# Patient Record
Sex: FEMALE | Race: BLACK OR AFRICAN AMERICAN | Employment: OTHER | URBAN - METROPOLITAN AREA
[De-identification: names, ages, dates, MRNs, and addresses within clinical notes are randomized per-mention and may not be internally consistent; named-entity substitution may affect disease eponyms.]

---

## 2017-04-30 PROBLEM — K80.20 CALCULUS OF GALLBLADDER WITHOUT CHOLECYSTITIS WITHOUT OBSTRUCTION: Status: ACTIVE | Noted: 2017-04-30

## 2017-04-30 PROBLEM — M23.50 CHRONIC KNEE INSTABILITY: Status: ACTIVE | Noted: 2017-04-30

## 2017-10-08 ENCOUNTER — HOSPITAL ENCOUNTER (EMERGENCY)
Age: 82
Discharge: HOME | End: 2017-10-08
Attending: EMERGENCY MEDICINE
Payer: MEDICARE

## 2017-10-08 ENCOUNTER — APPOINTMENT (OUTPATIENT)
Dept: CT IMAGING | Age: 82
End: 2017-10-08
Attending: EMERGENCY MEDICINE
Payer: MEDICARE

## 2017-10-08 VITALS
TEMPERATURE: 98.8 F | HEART RATE: 80 BPM | SYSTOLIC BLOOD PRESSURE: 134 MMHG | OXYGEN SATURATION: 94 % | RESPIRATION RATE: 18 BRPM | DIASTOLIC BLOOD PRESSURE: 73 MMHG

## 2017-10-08 DIAGNOSIS — R14.0 ABDOMINAL BLOATING: Primary | ICD-10-CM

## 2017-10-08 LAB
ALBUMIN SERPL-MCNC: 4.1 G/DL (ref 3.2–4.6)
ALBUMIN/GLOB SERPL: 1 {RATIO} (ref 1.2–3.5)
ALP SERPL-CCNC: 63 U/L (ref 50–136)
ALT SERPL-CCNC: 22 U/L (ref 12–65)
ANION GAP SERPL CALC-SCNC: 10 MMOL/L (ref 7–16)
AST SERPL-CCNC: 19 U/L (ref 15–37)
BACTERIA URNS QL MICRO: 0 /HPF
BASOPHILS # BLD: 0 K/UL (ref 0–0.2)
BASOPHILS NFR BLD: 0 % (ref 0–2)
BILIRUB SERPL-MCNC: 0.9 MG/DL (ref 0.2–1.1)
BUN SERPL-MCNC: 13 MG/DL (ref 8–23)
CALCIUM SERPL-MCNC: 11 MG/DL (ref 8.3–10.4)
CASTS URNS QL MICRO: NORMAL /LPF
CHLORIDE SERPL-SCNC: 103 MMOL/L (ref 98–107)
CO2 SERPL-SCNC: 27 MMOL/L (ref 21–32)
CREAT SERPL-MCNC: 0.98 MG/DL (ref 0.6–1)
DIFFERENTIAL METHOD BLD: ABNORMAL
EOSINOPHIL # BLD: 0 K/UL (ref 0–0.8)
EOSINOPHIL NFR BLD: 0 % (ref 0.5–7.8)
EPI CELLS #/AREA URNS HPF: NORMAL /HPF
ERYTHROCYTE [DISTWIDTH] IN BLOOD BY AUTOMATED COUNT: 13 % (ref 11.9–14.6)
GLOBULIN SER CALC-MCNC: 4.2 G/DL (ref 2.3–3.5)
GLUCOSE SERPL-MCNC: 121 MG/DL (ref 65–100)
HCT VFR BLD AUTO: 37.9 % (ref 35.8–46.3)
HGB BLD-MCNC: 12.6 G/DL (ref 11.7–15.4)
IMM GRANULOCYTES # BLD: 0 K/UL (ref 0–0.5)
IMM GRANULOCYTES NFR BLD: 0.4 % (ref 0–5)
LIPASE SERPL-CCNC: 218 U/L (ref 73–393)
LYMPHOCYTES # BLD: 1.2 K/UL (ref 0.5–4.6)
LYMPHOCYTES NFR BLD: 21 % (ref 13–44)
MCH RBC QN AUTO: 31.7 PG (ref 26.1–32.9)
MCHC RBC AUTO-ENTMCNC: 33.2 G/DL (ref 31.4–35)
MCV RBC AUTO: 95.2 FL (ref 79.6–97.8)
MONOCYTES # BLD: 0.2 K/UL (ref 0.1–1.3)
MONOCYTES NFR BLD: 4 % (ref 4–12)
NEUTS SEG # BLD: 4.2 K/UL (ref 1.7–8.2)
NEUTS SEG NFR BLD: 75 % (ref 43–78)
PLATELET # BLD AUTO: 226 K/UL (ref 150–450)
PMV BLD AUTO: 11 FL (ref 10.8–14.1)
POTASSIUM SERPL-SCNC: 3.4 MMOL/L (ref 3.5–5.1)
PROT SERPL-MCNC: 8.3 G/DL (ref 6.3–8.2)
RBC # BLD AUTO: 3.98 M/UL (ref 4.05–5.25)
RBC #/AREA URNS HPF: NORMAL /HPF
SODIUM SERPL-SCNC: 140 MMOL/L (ref 136–145)
TROPONIN I BLD-MCNC: 0.01 NG/ML (ref 0.02–0.05)
TROPONIN I BLD-MCNC: 0.01 NG/ML (ref 0.02–0.05)
WBC # BLD AUTO: 5.7 K/UL (ref 4.3–11.1)
WBC URNS QL MICRO: NORMAL /HPF

## 2017-10-08 PROCEDURE — 99285 EMERGENCY DEPT VISIT HI MDM: CPT | Performed by: EMERGENCY MEDICINE

## 2017-10-08 PROCEDURE — 74011000258 HC RX REV CODE- 258: Performed by: EMERGENCY MEDICINE

## 2017-10-08 PROCEDURE — 96375 TX/PRO/DX INJ NEW DRUG ADDON: CPT | Performed by: EMERGENCY MEDICINE

## 2017-10-08 PROCEDURE — 93005 ELECTROCARDIOGRAM TRACING: CPT | Performed by: EMERGENCY MEDICINE

## 2017-10-08 PROCEDURE — 96374 THER/PROPH/DIAG INJ IV PUSH: CPT | Performed by: EMERGENCY MEDICINE

## 2017-10-08 PROCEDURE — 80053 COMPREHEN METABOLIC PANEL: CPT | Performed by: EMERGENCY MEDICINE

## 2017-10-08 PROCEDURE — 81015 MICROSCOPIC EXAM OF URINE: CPT | Performed by: EMERGENCY MEDICINE

## 2017-10-08 PROCEDURE — 85025 COMPLETE CBC W/AUTO DIFF WBC: CPT | Performed by: EMERGENCY MEDICINE

## 2017-10-08 PROCEDURE — 83690 ASSAY OF LIPASE: CPT | Performed by: EMERGENCY MEDICINE

## 2017-10-08 PROCEDURE — 74011636320 HC RX REV CODE- 636/320: Performed by: EMERGENCY MEDICINE

## 2017-10-08 PROCEDURE — 74011250636 HC RX REV CODE- 250/636: Performed by: EMERGENCY MEDICINE

## 2017-10-08 PROCEDURE — 74177 CT ABD & PELVIS W/CONTRAST: CPT

## 2017-10-08 PROCEDURE — 84484 ASSAY OF TROPONIN QUANT: CPT

## 2017-10-08 RX ORDER — DICYCLOMINE HYDROCHLORIDE 20 MG/1
20 TABLET ORAL EVERY 6 HOURS
Qty: 10 TAB | Refills: 0 | Status: SHIPPED | OUTPATIENT
Start: 2017-10-08 | End: 2017-10-08

## 2017-10-08 RX ORDER — ONDANSETRON 2 MG/ML
4 INJECTION INTRAMUSCULAR; INTRAVENOUS
Status: COMPLETED | OUTPATIENT
Start: 2017-10-08 | End: 2017-10-08

## 2017-10-08 RX ORDER — DICYCLOMINE HYDROCHLORIDE 20 MG/1
20 TABLET ORAL EVERY 6 HOURS
Qty: 10 TAB | Refills: 0 | Status: SHIPPED | OUTPATIENT
Start: 2017-10-08 | End: 2017-10-13

## 2017-10-08 RX ORDER — HYDROMORPHONE HYDROCHLORIDE 1 MG/ML
0.5 INJECTION, SOLUTION INTRAMUSCULAR; INTRAVENOUS; SUBCUTANEOUS
Status: COMPLETED | OUTPATIENT
Start: 2017-10-08 | End: 2017-10-08

## 2017-10-08 RX ORDER — SODIUM CHLORIDE 0.9 % (FLUSH) 0.9 %
10 SYRINGE (ML) INJECTION
Status: COMPLETED | OUTPATIENT
Start: 2017-10-08 | End: 2017-10-08

## 2017-10-08 RX ADMIN — IOPAMIDOL 100 ML: 755 INJECTION, SOLUTION INTRAVENOUS at 20:00

## 2017-10-08 RX ADMIN — ONDANSETRON 4 MG: 2 INJECTION INTRAMUSCULAR; INTRAVENOUS at 18:48

## 2017-10-08 RX ADMIN — SODIUM CHLORIDE 100 ML: 900 INJECTION, SOLUTION INTRAVENOUS at 20:00

## 2017-10-08 RX ADMIN — HYDROMORPHONE HYDROCHLORIDE 0.5 MG: 1 INJECTION, SOLUTION INTRAMUSCULAR; INTRAVENOUS; SUBCUTANEOUS at 18:48

## 2017-10-08 RX ADMIN — Medication 10 ML: at 20:00

## 2017-10-08 NOTE — ED PROVIDER NOTES
HPI Comments: 80-year-old female presents with left lower quadrant pain and abdominal bloating which she states started this morning. She reports she vomited twice earlier today. She mentions feels somewhat constipated. She denies any significant fevers, shortness of breath, dysuria or chest pain. She mentioned she had a bowel movement this morning. She has a history of previous stroke with right-sided weakness and is currently taking Plavix. Patient denies any significant previous abdominal surgeries or history of diverticulitis. Patient is a 80 y.o. female presenting with abdominal pain. The history is provided by the patient. No  was used. Abdominal Pain    Associated symptoms include constipation. Pertinent negatives include no fever, no diarrhea, no nausea, no vomiting and no back pain.         Past Medical History:   Diagnosis Date    Abnormality of gait 5/20/2015    Allergic rhinitis 6/4/2013    Asymptomatic bilateral carotid artery stenosis 10/14/2014    Back problem 10/27/2015    Cerebrovascular disease 5/20/2015    Cerumen impaction     Essential hypertension, benign 6/4/2013    GI bleed     due to ulcer    Glaucoma 5/20/2015    Hx of seasonal allergies     Hyperparathyroidism ([address] 6/4/2013    Impaired fasting glucose 5/20/2015    Occlusion and stenosis of carotid artery without mention of cerebral infarction 5/20/2015    Osteoporosis, unspecified 6/4/2013    Other and unspecified hyperlipidemia 6/4/2013    Other decreased white blood cell count 6/4/2013    SNHL (sensorineural hearing loss) 6/4/2013    Stroke ([address]     Toxic multinodular goiter 6/4/2013    Toxic multinodular goiter without mention of thyrotoxic crisis or storm 6/4/2013    Transient ischemic attack (TIA), and cerebral infarction without residual deficits(V12.54) 6/4/2013    Unspecified hearing loss 6/4/2013    Vitamin D deficiency        Past Surgical History:   Procedure Laterality Date    HX CATARACT REMOVAL Bilateral     HX COLONOSCOPY      HX HEENT      Sinus surgery         Family History:   Problem Relation Age of Onset    Diabetes Mother     Kidney Disease Maternal Grandfather        Social History     Social History    Marital status:      Spouse name: N/A    Number of children: N/A    Years of education: N/A     Occupational History    Not on file. Social History Main Topics    Smoking status: Never Smoker    Smokeless tobacco: Never Used    Alcohol use No    Drug use: No    Sexual activity: No     Other Topics Concern    Not on file     Social History Narrative         ALLERGIES: Seafood and Luck    Review of Systems   Constitutional: Negative for fatigue and fever. Eyes: Negative for visual disturbance. Respiratory: Negative for cough and shortness of breath. Gastrointestinal: Positive for abdominal distention, abdominal pain and constipation. Negative for diarrhea, nausea and vomiting. Genitourinary: Negative for flank pain. Musculoskeletal: Negative for back pain. Skin: Negative for rash. Neurological: Negative for weakness. Psychiatric/Behavioral: Negative for confusion. Vitals:    10/08/17 1717   BP: 196/82   Pulse: 68   Resp: 18   Temp: 98.1 °F (36.7 °C)   SpO2: 100%            Physical Exam   Constitutional: She is oriented to person, place, and time. She appears well-developed and well-nourished. No distress. HENT:   Head: Normocephalic and atraumatic. Eyes: Conjunctivae and EOM are normal. Pupils are equal, round, and reactive to light. Neck: Normal range of motion. Neck supple. Cardiovascular: Normal rate, regular rhythm and normal heart sounds. Pulmonary/Chest: Effort normal and breath sounds normal. No respiratory distress. She has no wheezes. She has no rales. Abdominal: Soft. She exhibits no distension. There is tenderness. There is no rebound. Tenderness to palpation in the left lower quadrant.   No significant guarding or rebound. Overall nonsurgical abdomen. Musculoskeletal: Normal range of motion. She exhibits no edema or tenderness. Neurological: She is alert and oriented to person, place, and time. Skin: Skin is warm and dry. No rash noted. She is not diaphoretic. Psychiatric: She has a normal mood and affect. Her behavior is normal.   Vitals reviewed. MDM  Number of Diagnoses or Management Options  Abdominal bloating: new and does not require workup  Diagnosis management comments: Patient's lab work is quite reassuring. Patient's CT shows no acute findings. Her gallbladder does have stones but she is not tender in her right upper quadrant and her LFTs are normal. I discussed this timing with the patient and she agrees to bring up with her PCP. We will have the patient follow up with primary care provider. Will discharge with Bentyl. I advised she drinks prune juice for her constipation. Return precautions discussed.        Amount and/or Complexity of Data Reviewed  Clinical lab tests: ordered and reviewed (Results for orders placed or performed during the hospital encounter of 10/08/17  -CBC WITH AUTOMATED DIFF       Result                                            Value                         Ref Range                       WBC                                               5.7                           4.3 - 11.1 K/uL                 RBC                                               3.98 (L)                      4.05 - 5.25 M/uL                HGB                                               12.6                          11.7 - 15.4 g/dL                HCT                                               37.9                          35.8 - 46.3 %                   MCV                                               95.2                          79.6 - 97.8 FL                  MCH                                               31.7                          26.1 - 32.9 PG MCHC                                              33.2                          31.4 - 35.0 g/dL                RDW                                               13.0                          11.9 - 14.6 %                   PLATELET                                          226                           150 - 450 K/uL                  MPV                                               11.0                          10.8 - 14.1 FL                  DF                                                AUTOMATED                                                     NEUTROPHILS                                       75                            43 - 78 %                       LYMPHOCYTES                                       21                            13 - 44 %                       MONOCYTES                                         4                             4.0 - 12.0 %                    EOSINOPHILS                                       0 (L)                         0.5 - 7.8 %                     BASOPHILS                                         0                             0.0 - 2.0 %                     IMMATURE GRANULOCYTES                             0.4                           0.0 - 5.0 %                     ABS. NEUTROPHILS                                  4.2                           1.7 - 8.2 K/UL                  ABS. LYMPHOCYTES                                  1.2                           0.5 - 4.6 K/UL                  ABS. MONOCYTES                                    0.2                           0.1 - 1.3 K/UL                  ABS. EOSINOPHILS                                  0.0                           0.0 - 0.8 K/UL                  ABS. BASOPHILS                                    0.0                           0.0 - 0.2 K/UL                  ABS. IMM.  GRANS.                                  0.0                           0.0 - 0.5 K/UL             -METABOLIC PANEL, COMPREHENSIVE       Result Value                         Ref Range                       Sodium                                            140                           136 - 145 mmol/L                Potassium                                         3.4 (L)                       3.5 - 5.1 mmol/L                Chloride                                          103                           98 - 107 mmol/L                 CO2                                               27                            21 - 32 mmol/L                  Anion gap                                         10                            7 - 16 mmol/L                   Glucose                                           121 (H)                       65 - 100 mg/dL                  BUN                                               13                            8 - 23 MG/DL                    Creatinine                                        0.98                          0.6 - 1.0 MG/DL                 GFR est AA                                        >60                           >60 ml/min/1.73m2               GFR est non-AA                                    58 (L)                        >60 ml/min/1.73m2               Calcium                                           11.0 (H)                      8.3 - 10.4 MG/DL                Bilirubin, total                                  0.9                           0.2 - 1.1 MG/DL                 ALT (SGPT)                                        22                            12 - 65 U/L                     AST (SGOT)                                        19                            15 - 37 U/L                     Alk. phosphatase                                  63                            50 - 136 U/L                    Protein, total                                    8.3 (H)                       6.3 - 8.2 g/dL                  Albumin                                           4.1 3.2 - 4.6 g/dL                  Globulin                                          4.2 (H)                       2.3 - 3.5 g/dL                  A-G Ratio                                         1.0 (L)                       1.2 - 3.5                  -LIPASE       Result                                            Value                         Ref Range                       Lipase                                            218                           73 - 393 U/L               -URINE MICROSCOPIC       Result                                            Value                         Ref Range                       WBC                                               10-20                         0 /hpf                          RBC                                               0-3                           0 /hpf                          Epithelial cells                                  0-3                           0 /hpf                          Bacteria                                          0                             0 /hpf                          Casts                                             0-3                           0 /lpf                     -POC TROPONIN-I       Result                                            Value                         Ref Range                       Troponin-I (POC)                                  0.01 (L)                      0.02 - 0.05 ng/ml          -POC TROPONIN-I       Result                                            Value                         Ref Range                       Troponin-I (POC)                                  0.01 (L)                      0.02 - 0.05 ng/ml          )  Tests in the radiology section of CPT®: ordered and reviewed (Ct Abd Pelv W Cont    Result Date: 10/8/2017  CT ABDOMEN AND PELVIS WITH INTRAVENOUS CONTRAST DATED 10/8/2017. History: Left lower quadrant pain this morning. Comparison: None.  Technique:   Multiple contiguous helical CT images reconstructed at 5 mm intervals were obtained from above the diaphragms through the ischial tuberosities following 100 cc Isovue-370 without acute complication. All CT scans performed at this facility use one or all of the following: Automated exposure control, adjustment of the mA and/or kVp according to patient's size, iterative reconstruction. Findings: CT ABDOMEN:  Limited evaluation of the lung bases and base of the mediastinum demonstrates no significant abnormalities. The Liver demonstrates multiple cystic-appearing lesions. Mild to moderate intrahepatic biliary ductal dilation is seen. .  The spleen is homogeneous in attenuation. No contour deforming or enhancing mass lesions are seen of the pancreas or adrenal glands. The gallbladder contains numerous gallstones. The common bile duct appears dilated measuring up to 14 mm in diameter. No definite distal obstructing calcified stone or lesion is definitely appreciated. The kidneys enhance symmetrically and no evidence of hydronephrosis is seen. Multiple cystic appearing cortical lesions are seen of the kidneys. Definitive assessment of multiple of these is limited by their small size. The visualized loops of small bowel and colon are normal in caliber. The appendix is best seen on image 50 and is unremarkable. No free fluid, free air, or focal inflammatory changes are seen in the abdomen. No adenopathy is seen. The abdominal aorta demonstrates moderate atherosclerotic calcification. CT PELVIS: No abnormal pelvic fluid collections or inflammatory changes are present. No pelvic adenopathy is seen. The urinary bladder is unremarkable. IMPRESSION:  1. No acute findings are seen to suggest an etiology for the patient's abdominal pain. Specifically, no bowel changes, free air, abnormal fluid collections or focal inflammatory changes are seen.  2.  Gallbladder appearing filled with gallstones and prominence of the intrahepatic and extrahepatic bile ducts. This is unlikely to be associated with the patient's left lower quadrant symptoms. Recommend correlation with liver function tests.  If indicated, this would be further assessed with an outpatient Limited abdominal ultrasound.     )  Review and summarize past medical records: yes  Independent visualization of images, tracings, or specimens: yes    Risk of Complications, Morbidity, and/or Mortality  Presenting problems: moderate  Diagnostic procedures: moderate  Management options: moderate    Patient Progress  Patient progress: improved    ED Course       Procedures

## 2017-10-08 NOTE — ED TRIAGE NOTES
During assessment, it has been found that patient does not actually have back pain but rather general abdominal pain that worse in the LLQ.

## 2017-10-08 NOTE — ED NOTES
Pt resting in chair, resp easy , VSS, family at bedside, belongs in reach. Offered restroom and change in position.

## 2017-10-08 NOTE — ED TRIAGE NOTES
Pt arrives with EMS [address], from home, with complaint of left lower back pain that started this morning. No recent falls or trauma. History of stroke with R sided weakness. Patient alert and oriented. Patient states that she gets sore in the same area sometimes, but not this bad.

## 2017-10-09 LAB
ATRIAL RATE: 73 BPM
CALCULATED P AXIS, ECG09: 51 DEGREES
CALCULATED R AXIS, ECG10: 22 DEGREES
CALCULATED T AXIS, ECG11: 21 DEGREES
DIAGNOSIS, 93000: NORMAL
P-R INTERVAL, ECG05: 210 MS
Q-T INTERVAL, ECG07: 388 MS
QRS DURATION, ECG06: 120 MS

## 2017-10-09 NOTE — DISCHARGE INSTRUCTIONS
Gas and Bloating: Care Instructions  Your Care Instructions  Gas and bloating can be uncomfortable and embarrassing problems. All people pass gas, but some people produce more gas than others, sometimes enough to cause distress. It is normal to pass gas from 6 to 20 times per day. Excess gas usually is not caused by a serious health problem. Gas and bloating usually are caused by something you eat or drink, including some food supplements and medicines. Gas and bloating are usually harmless and go away without treatment. However, changing your diet can help end the problem. Some over-the-counter medicines can help prevent gas and relieve bloating. Follow-up care is a key part of your treatment and safety. Be sure to make and go to all appointments, and call your doctor if you are having problems. It's also a good idea to know your test results and keep a list of the medicines you take. How can you care for yourself at home? · Keep a food diary if you think a food gives you gas. Write down what you eat or drink. Also record when you get gas. If you notice that a food seems to cause your gas each time, avoid it and see if the gas goes away. Examples of foods that cause gas include:  ¨ Fried and fatty foods. ¨ Beans. ¨ Vegetables such as artichokes, asparagus, broccoli, brussels sprouts, cabbage, cauliflower, cucumbers, green peppers, onions, peas, radishes, and raw potatoes. ¨ Fruits such as apricots, bananas, melons, peaches, pears, prunes, and raw apples. ¨ Wheat and wheat bran. · Soak dry beans in water overnight, then dump the water and cook the soaked beans in new water. This can help prevent gas and bloating. · If you have problems with lactose, avoid dairy products such as milk and cheese. · Try not to swallow air. Do not drink through a straw, gulp your food, or chew gum. · Take an over-the-counter medicine. Read and follow all instructions on the label.   ¨ Food enzymes, such as Beano, can be added to gas-producing foods to prevent gas. ¨ Antacids, such as Maalox Anti-Gas and Mylanta Gas, can relieve bloating by making you burp. Be careful when you take over-the-counter antacid medicines. Many of these medicines have aspirin in them. Read the label to make sure that you are not taking more than the recommended dose. Too much aspirin can be harmful. ¨ Activated charcoal tablets, such as CharcoCaps, may decrease odor from gas you pass. ¨ If you have problems with lactose, you can take medicines such as Dairy Ease and Lactaid with dairy products to prevent gas and bloating. · Get some exercise regularly. When should you call for help? Call 911 anytime you think you may need emergency care. For example, call if:  · You have gas and signs of a heart attack, such as:  ¨ Chest pain or pressure. ¨ Sweating. ¨ Shortness of breath. ¨ Nausea or vomiting. ¨ Pain that spreads from the chest to the neck, jaw, or one or both shoulders or arms. ¨ Dizziness or lightheadedness. ¨ A fast or uneven pulse. After calling 911, chew 1 adult-strength aspirin. Wait for an ambulance. Do not try to drive yourself. Call your doctor now or seek immediate medical care if:  · You have severe belly pain. · You have blood in your stool. Watch closely for changes in your health, and be sure to contact your doctor if:  · You have blood or pus in your urine. · Your urine is cloudy or smells bad. · You are burping and have trouble swallowing. · You feel bloated and have swelling in your belly. · You do not get better as expected. Where can you learn more? Go to http://martin-anton.info/. Enter C384 in the search box to learn more about \"Gas and Bloating: Care Instructions. \"  Current as of: March 20, 2017  Content Version: 11.3  © 4505-1799 [employer]. Care instructions adapted under license by [employer] (which disclaims liability or warranty for this information).  If you have questions about a medical condition or this instruction, always ask your healthcare professional. [place] [address] any warranty or liability for your use of this information.

## 2017-10-09 NOTE — ED NOTES
I have reviewed discharge instructions with the patient and her daughter. The patient and daughter verbalized understanding. Patient left ED via Discharge Method: wheelchair to Home with Daughter    Opportunity for questions and clarification provided. Patient given 1 scripts.

## 2017-10-12 ENCOUNTER — PATIENT OUTREACH (OUTPATIENT)
Dept: CASE MANAGEMENT | Age: 82
End: 2017-10-12

## 2017-10-13 NOTE — PROGRESS NOTES
Date/Time of Call:       10/09/17 1:00 PM   What was the patient seen in the ED for? Patient was seen in ED for diagnosis of Abdominal Bloating   Does the patient understand his/her diagnosis and/or treatment and what happened during the ED visit? Spoke with Daughter who stated understanding of treatment and diagnosis. Did the patient receive discharge instructions from the ED? Daughter stated receiving d/c instructions from the ED. Review any discharge instructions (see notes in [facility]). Ask patient if they understand these. Do they have any questions? Daughter and Care Coordinator reviewed DC instructions. Daughter stated understanding and no questions asked. Were home services ordered (nursing, PT, OT, ST, etc.)? No HH ordered at d/c   If so, has the first visit occurred? If not, why? (Assist with coordination of services if necessary.) N/A   DME ordered at d/c? No DME ordered at d/c. If so, has it been received? If not, why?  (Assist with coordination of arranging DME orders if necessary.) N/A   Complete a review of all medications (new, continued and discontinued meds per the D/C instructions and medication tab in [facility]). Review of medications has been completed by Daughter and Care Coordinator. Bentyl prescribed at d/c. Were all new prescriptions filled? If not, why?  (Assist with obtainment of medications if necessary.) Yes. Does the patient understand the purpose and dosing instructions for all medications? (If patient has questions, provide explanation and education.) Daughter stated understanding of purpose, and dosing instructions for all medications. Does the patient have any problems in performing ADLs? (If patient is unable to perform ADLs  what is the limiting factor(s)?   Do they have a support system that can assist? If no support system is present, discuss possible assistance that they may be able to obtain.) Daughter states patient is independent with all ADLs. Does the patient have all follow-up appointments scheduled? Has transportation been arranged? 7 day f/up with PCP?    7-14 day f/up with specialist?    If f/up has not been made  what actions has the care coordinator made to accomplish this? Has transportation been arranged? [facility] follow-up should be within 7 days of discharge; all others should have PCP follow-up within 7 days of discharge; follow-ups with other specialists as appropriate or ordered.) PCP f/u 10/11. No need for transportation assistance. Any other questions or concerns expressed by the patient? No other questions asked. No further needs identified. Gratitude expressed for care and call. HERMINIA Call Completed By: Gely Betancourt LPN   Care Coordinator  [facility]          This note will not be viewable in [address]

## 2018-06-12 PROBLEM — I35.1 AORTIC VALVE REGURGITATION: Status: ACTIVE | Noted: 2018-06-12

## 2019-03-29 ENCOUNTER — HOSPITAL ENCOUNTER (OUTPATIENT)
Dept: PHYSICAL THERAPY | Age: 84
Discharge: HOME | End: 2019-03-29
Attending: INTERNAL MEDICINE
Payer: MEDICARE

## 2019-03-29 DIAGNOSIS — Z91.81 AT RISK FOR FALLS: ICD-10-CM

## 2019-03-29 DIAGNOSIS — R26.89 POOR BALANCE: ICD-10-CM

## 2019-03-29 DIAGNOSIS — R26.9 GAIT ABNORMALITY: ICD-10-CM

## 2019-03-29 PROCEDURE — 97161 PT EVAL LOW COMPLEX 20 MIN: CPT

## 2019-03-29 PROCEDURE — 97110 THERAPEUTIC EXERCISES: CPT

## 2019-03-29 NOTE — THERAPY EVALUATION
Michael Racer  : 1934  Primary: [address] Medic*  Secondary:  [address] at [facility]  2700 [facility], [address] [place], [place] [address]way [address], [address]  [phone]   [phone]        OUTPATIENT PHYSICAL THERAPY:Initial Assessment 3/29/2019   ICD-10: Treatment Diagnosis: Difficulty in walking, not elsewhere classified (R26.2)/Muscle weakness (generalized) (M62.81)  Precautions/Allergies:   Seafood and [place]   MD Orders: Evaluate and treat  MEDICAL/REFERRING DIAGNOSIS:  Gait abnormality [R26.9]  At risk for falls [Z91.81]  Poor balance [R26.89]   DATE OF ONSET: Chronic   REFERRING PHYSICIAN: Veronika Colon*  RETURN PHYSICIAN APPOINTMENT: unknown      INITIAL ASSESSMENT:  Ms. Kim Sorensen presents with decreased lower extremity strength/core strength, imbalance, and difficulty walking. Patient is at higher fall risk based on score received on Guerra Balance Scale. Patient would benefit from skilled physical therapy to address problems and goals. Thank you for this referral.      PROBLEM LIST (Impacting functional limitations):  1. Decreased Strength  2. Decreased Transfer Abilities  3. Decreased Ambulation Ability/Technique  4. Decreased Balance  5. Decreased Activity Tolerance  6. Decreased [place] with Home Exercise Program INTERVENTIONS PLANNED: (Treatment may consist of any combination of the following)  1. Balance Exercise  2. Gait Training  3. Home Exercise Program (HEP)  4. Neuromuscular Re-education/Strengthening  5. Range of Motion (ROM)  6. Therapeutic Exercise/Strengthening   TREATMENT PLAN:  Effective Dates: 3/29/2019 TO 2019 (90 days). Frequency/Duration: 1 time a week for 90 Day(s)  GOALS: (Goals have been discussed and agreed upon with patient.)  Short-Term Functional Goals: Time Frame: 30 days   1. Patient will be independent with home exercise program to improve strength and balance.    2. Patient will increase score on Guerra Balance Scale to greater than or equal to 43/56 demonstrating improved balance and decreased fall risk with daily activities. Discharge Goals: Time Frame: 90 days   1. Patient will increase score on Guerra Balance Scale to greater than or equal to 46/56 demonstrating improved balance and decreased fall risk with daily activities. 2. Patient will report improved ease with mobility. 3. Patient will ambulate with rolling walker demonstrating erect posture. OUTCOME MEASURE:   Tool Used: Guerra Balance Scale  Score:  Initial: 38/56 Most Recent: X/56 (Date: -- )   Interpretation of Score: Each section is scored on a 0-4 scale, 0 representing the patients inability to perform the task and 4 representing independence. The scores of each section are added together for a total score of 56. The higher the patients score, the more independent the patient is. Any score below 45 indicates increased risk for falls. MEDICAL NECESSITY:   · Patient is expected to demonstrate progress in strength and balance to improve safety during activities of daily living. REASON FOR SERVICES/OTHER COMMENTS:  · Patient continues to require skilled intervention due to higher fall risk with daily activities. Total Duration:  PT Patient Time In/Time Out  Time In: 1345  Time Out: 1430    Rehabilitation Potential For Stated Goals: Excellent  Regarding Breana Justin's therapy, I certify that the treatment plan above will be carried out by a therapist or under their direction. Thank you for this referral,  Yovanny Lynn, PT     Referring Physician Signature: Edilson Sherman* _______________________________ Date _____________     PAIN/SUBJECTIVE:   Initial: Pain Intensity 1: 0  Post Session:  0/10   HISTORY:   History of Injury/Illness (Reason for Referral):  Patient complains of weakness, imbalance, and difficulty walking. Patient has had no falls over the past several months. Patient rates dizziness as 0/10 and pain level as 0/10. Patient has been using a rollator walker for ambulation over the past six months. Patient would like to get her legs and back stronger. Past Medical History/Comorbidities:   Ms. Elaina Thomas  has a past medical history of Abnormality of gait (5/20/2015), Allergic rhinitis (6/4/2013), Asymptomatic bilateral carotid artery stenosis (10/14/2014), Back problem (10/27/2015), Cerebrovascular disease (5/20/2015), Cerumen impaction, Essential hypertension, benign (6/4/2013), GI bleed, Glaucoma (5/20/2015), seasonal allergies, Hyperparathyroidism ([facility] [address] (6/4/2013), Impaired fasting glucose (5/20/2015), Occlusion and stenosis of carotid artery without mention of cerebral infarction (5/20/2015), Osteoporosis, unspecified (6/4/2013), Other and unspecified hyperlipidemia (6/4/2013), Other decreased white blood cell count (6/4/2013), SNHL (sensorineural hearing loss) (6/4/2013), Stroke ([facility] [address] Toxic multinodular goiter (6/4/2013), Toxic multinodular goiter without mention of thyrotoxic crisis or storm (6/4/2013), Transient ischemic attack (TIA), and cerebral infarction without residual deficits(V12.54) (6/4/2013), Unspecified hearing loss (6/4/2013), and Vitamin D deficiency. Ms. Elaina Thomas  has a past surgical history that includes hx colonoscopy; hx cataract removal (Bilateral); and hx heent. Social History/Living Environment:     Lives with daughter in a one story home. Patient has no steps to enter. Prior Level of Function/Work/Activity:  Independent. Retired. Dominant Side:         RIGHT  Personal Factors:          Sex:  female        Age:  80 y.o. Ambulatory/Rehab Services H2 Model Falls Risk Assessment   Risk Factors:       (1)  Dizziness/Vertigo Ability to Rise from Chair:       (1)  Pushes up, successful in one attempt   Falls Prevention Plan:       No modifications necessary   Total: (5 or greater = High Risk): 2   ©2010 [employer]. All Rights Reserved. [facility] Patent #[id number].  Federal Law prohibits the replication, distribution or use without written permission from [facility]   Current Medications:       Current Outpatient Medications:     cinacalcet (SENSIPAR) 30 mg tablet, TAKE 1 TABLET BY MOUTH EVERY DAY, Disp: , Rfl: 0    irbesartan (AVAPRO) 300 mg tablet, Take 1 Tab by mouth nightly., Disp: 30 Tab, Rfl: 3    pravastatin (PRAVACHOL) 10 mg tablet, Take 1 Tab by mouth nightly., Disp: 90 Tab, Rfl: 3    methIMAzole (TAPAZOLE) 5 mg tablet, Take one tab 2 times per week, Disp: , Rfl:     ezetimibe (ZETIA) 10 mg tablet, Take 1 Tab by mouth daily. , Disp: 90 Tab, Rfl: 3    dorzolamide HCl/timolol maleat (DORZOLAMIDE-TIMOLOL OP), Apply 1 Drop to eye. Both eyes daily, Disp: , Rfl:     montelukast (SINGULAIR) 10 mg tablet, Take 1 Tab by mouth daily. , Disp: 90 Tab, Rfl: 3    clopidogrel (PLAVIX) 75 mg tab, Take 1 Tab by mouth daily. , Disp: 90 Tab, Rfl: 3    chlorthalidone (HYGROTEN) 25 mg tablet, TAKE 1/2 TABLET ONCE DAILY, Disp: 45 Tab, Rfl: 3    ibandronate (BONIVA) 150 mg tablet, Take 150 mg by mouth every thirty (30) days. , Disp: , Rfl:     polyethylene glycol (MIRALAX) 17 gram/dose powder, DISSOLVE 17 GRAMS INTO LIQUID THEN DRINK BY MOUHT ONCE A DAY as needed, Disp: , Rfl: 0    travoprost (TRAVATAN Z) 0.004 % ophthalmic solution, Administer 1 Drop to both eyes every evening., Disp: , Rfl:     ASCORBATE CALCIUM (VITAMIN C PO), Take  by mouth., Disp: , Rfl:     omega-3 fatty acids-vitamin e (FISH OIL) 1,000 mg cap, Take 1 Cap by mouth., Disp: , Rfl:     ECHINACEA PO, Take  by mouth., Disp: , Rfl:     Cholecalciferol, Vitamin D3, (VITAMIN D3) 1,000 unit cap, Take  by mouth. Every other day, Disp: , Rfl:     COD LIVER OIL PO, Take  by mouth., Disp: , Rfl:     brimonidine (ALPHAGAN P) 0.1 % ophthalmic solution, every eight (8) hours. , Disp: , Rfl:     fexofenadine (ALLEGRA) 180 mg tablet, Take  by mouth daily as needed. , Disp: , Rfl:    Date Last Reviewed:  3/29/2019   Number of Personal Factors/Comorbidities that affect the Plan of Care: 0: LOW COMPLEXITY   EXAMINATION:   Observation/Orthostatic Postural Assessment:          Patient stands with flexed forward posture/rounded shoulders. Functional Mobility:         Gait/Ambulation:  Patient ambulates with rollator walker demonstrating decreased gait speed. Strength:          Hip flexion right 4/5 and left 4+/5, hip abduction right 4/5 and left 4+/5, hip adduction right 4/5 and let 4+/5, quadriceps 5/5, hamstrings 5/5, ankle dorsiflexion 5/5, and ankle plantarflexion 5/5. Sensation:         Decreased right lower extremity. Postural Control & Balance:  · Guerra Balance Scale:  38/56.   (A score less than 45/56 indicates high risk of falls)        Body Structures Involved:  1. Muscles Body Functions Affected:  1. Neuromusculoskeletal Activities and Participation Affected:  1.  Mobility   Number of elements (examined above) that affect the Plan of Care: 3: MODERATE COMPLEXITY   CLINICAL PRESENTATION:   Presentation: Stable and uncomplicated: LOW COMPLEXITY   CLINICAL DECISION MAKING:   Use of outcome tool(s) and clinical judgement create a POC that gives a: Clear prediction of patient's progress: LOW COMPLEXITY

## 2019-03-29 NOTE — PROGRESS NOTES
Kota Deleon  : 1934  Primary: [address] Medic*  Secondary:  [address] at [facility]  2700 [facility], [address] [place], [place] [address]way [address], 0984 [facility]  [phone]   [phone]     OUTPATIENT PHYSICAL THERAPY: Daily Treatment Note 3/29/2019  Pre-treatment Symptoms/Complaints:  Imbalance, weakness, and difficulty walking  Pain: Initial: Pain Intensity 1: 0  Post Session:  0/10   Medications Last Reviewed:  3/29/2019  Updated Objective Findings:  See evaluation note from today   TREATMENT:     THERAPEUTIC EXERCISE: (10 minutes):  Exercises per grid below to improve strength. Required minimal verbal cues to promote proper body alignment. Progressed repetitions as indicated. Date:  3/29/2019 Date:   Date:     Activity/Exercise Parameters Parameters Parameters   Pelvic tilts 5 reps     Marching in place 10 reps     Standing feet together Eyes closed     Bridging 10 reps                           MedBridge Portal  Treatment/Session Summary:    · Response to Treatment:  Patient fatigues quickly with exercises. · Communication/Consultation:  None today  · Equipment provided today:  None today  · Recommendations/Intent for next treatment session: Next visit will focus on strengthening exercises and balance exercises.   Treatment Plan of Care Effective Dates:  3/29/2019 to 2019  Total Treatment Billable Duration:  10 minutes  PT Patient Time In/Time Out  Time In: 1345  Time Out: Elly Verdugo, PT    Future Appointments   Date Time Provider Miley Tate   2019  2:30 PM Christiano Elmore, PT [facility] SFE   2019  2:00 PM Vissage Kassie Yvonne, PT [facility] SFE   2019  2:45 PM Vissage, Kassie Yvonne, PT [facility] SFE   5/3/2019  1:00 PM VSA ULTRASOUND 2 [facility] VSA   5/10/2019 11:15 AM Vissage, Kassie Yvonne, PT [facility]PT SFE   2019 11:15 AM Vissage, Kassie Yvonne, PT SFEORPT SFE   2019 11:15 AM Vissage, Kassie Yvonne, PT SFEORPT SFE   2019 11:15 AM Kassie Cornell, PT SFEORPT E   9/13/2019  9:15 AM Tomas Sánchez MD [facility] CAF CAF

## 2019-04-17 ENCOUNTER — HOSPITAL ENCOUNTER (OUTPATIENT)
Dept: PHYSICAL THERAPY | Age: 84
Discharge: HOME | End: 2019-04-17
Attending: INTERNAL MEDICINE
Payer: MEDICARE

## 2019-04-17 PROCEDURE — 97110 THERAPEUTIC EXERCISES: CPT

## 2019-04-17 NOTE — PROGRESS NOTES
Lawrence Lund  : 1934  Primary: [address] [facility]*  Secondary:  [address] at [facility]  2700 [facility], [address], [address]  [phone]   [phone]     OUTPATIENT PHYSICAL THERAPY: Daily Treatment Note 2019  ICD-10: Treatment Diagnosis:   · Difficulty in walking, not elsewhere classified (R26.2)  · Muscle weakness (generalized) (M62.81)  Pre-treatment Symptoms/Complaints:  2019: Patient reports she is tired and hot today. Pain: Initial: Pain Intensity 1: 0  Post Session:  0/10   Medications Last Reviewed:  3/29/2019  Updated Objective Findings:  None Today   TREATMENT:     THERAPEUTIC EXERCISE: (45 minutes):  Exercises per grid below to improve strength. Required minimal verbal cues to promote proper body alignment. Progressed repetitions as indicated. Date:  2019   Activity/Exercise Parameters   Hooklying lower trunk rotation 10 reps   Verbal cues to slow down to feel the stretch and use abdominal muscles to help return LEs to upright   Hooklying pelvic tilts 15 reps   Supine straight leg raises 15 reps  B LE   Hooklying hip abduction Green t-band  15 reps  B LE   Isometric hip flexion 10 second hold  5 reps  B LE   Standing hip flexion/march 15 reps  B LE   Standing hip extension 15 reps  B LE   Standing hip abduction 15 reps  B LE   Standing knee flexion 15 reps  B LE   Standing heel raises 15 reps  B LE   Standing toe raises 15 reps  B LE   Nu-step 5 minutes  Level 3     Treatment/Session Summary:    · Response to Treatment:  Patient tolerated treatment without complaints of increased imbalance or fatigue. Patient verbalized and demonstrated understanding of HEP. · Communication/Consultation:  None today  · Equipment provided today:  None today  · Recommendations/Intent for next treatment session: Next visit will focus on strengthening exercises and balance exercises.   Treatment Plan of Care Effective Dates:  3/29/2019 to 6/27/2019  Total Treatment Billable Duration:  45 minutes  PT Patient Time In/Time Out  Time In: 1645  Time Out: [address], PT    Future Appointments   Date Time Provider Miley Lea   4/26/2019  2:45 PM Kevin Alexis, PT [facility] SFE   5/3/2019  1:00 PM VSA ULTRASOUND 2 [facility] BSVS VSA   5/10/2019 11:15 AM Jimmy Cornell, PT [facility] SFE   5/17/2019 11:15 AM Jimmy Cornell, PT [facility] SFE   5/24/2019 11:15 AM Jimmy Cornell, PT [facility]ORPT SFE   5/31/2019  9:55 AM Gale Woodward [facility]   5/31/2019 11:15 AM Kevin Alexis, PT SFEORPT SFE   9/13/2019  9:15 AM Sunny Sánchez MD [facility]

## 2019-04-26 ENCOUNTER — HOSPITAL ENCOUNTER (OUTPATIENT)
Dept: PHYSICAL THERAPY | Age: 84
Discharge: HOME | End: 2019-04-26
Attending: INTERNAL MEDICINE
Payer: MEDICARE

## 2019-04-26 PROCEDURE — 97110 THERAPEUTIC EXERCISES: CPT

## 2019-04-26 NOTE — PROGRESS NOTES
Gale Huggins  : 1934  Primary: [address] Medic*  Secondary:  [address] at [facility]  2700 [facility], [address], [facility]  [phone]   [phone]     OUTPATIENT PHYSICAL THERAPY: Daily Treatment Note 2019  ICD-10: Treatment Diagnosis:   · Difficulty in walking, not elsewhere classified (R26.2)  · Muscle weakness (generalized) (M62.81)  Pre-treatment Symptoms/Complaints:  2019: Patient reports she is feeling a little dizzy today. Pain: Initial: Pain Intensity 1: 0  Post Session:  0/10   Medications Last Reviewed:  2019  Updated Objective Findings:  None Today   TREATMENT:     THERAPEUTIC EXERCISE: (45 minutes):  Exercises per grid below to improve strength. Required minimal verbal cues to promote proper body alignment. Progressed repetitions as indicated. Date:  2019   Activity/Exercise Parameters   Hooklying lower trunk rotation 10 reps   Verbal cues to slow down to feel the stretch and use abdominal muscles to help return LEs to upright   Hooklying pelvic tilts 15 reps   Supine straight leg raises    Hooklying hip abduction Green t-band  15 reps  B LE   Isometric hip flexion X 10 reps   Standing hip flexion/march 15 reps  B LE   Standing hip extension 15 reps  B LE   Standing hip abduction 15 reps  B LE   Standing knee flexion 15 reps  B LE   Standing heel raises 15 reps  B LE   Standing toe raises 15 reps  B LE   Standing erect posture in parallel bard 1 minute   Step ups 6 inch  x10 reps   Sit to stand from hilo mat X 10 reps   Nu-step 5 minutes  Level 3     Treatment/Session Summary:    · Response to Treatment:  Patient tolerated additional exercises without issues.   Patient given green/blue theraband for home exercise program.  · Communication/Consultation:  None today  · Equipment provided today:  None today  · Recommendations/Intent for next treatment session: Next visit will focus on strengthening exercises and balance exercises.   Treatment Plan of Care Effective Dates:  3/29/2019 to 6/27/2019  Total Treatment Billable Duration:  45 minutes  PT Patient Time In/Time Out  Time In: 5333  Time Out: [place], PT    Future Appointments   Date Time Provider Miley Tate   5/3/2019  1:00 PM VSA ULTRASOUND 2 S[facility] BSVS VSA   5/10/2019 11:15 AM Juan Jose Cornell Morning, PT SFEORPT SFE   5/17/2019 11:15 AM Juan Jose Cornell Morning, PT SFEORPT SFE   5/24/2019 11:15 AM Juan Jose Cornell Morning, PT SFEORPT SFE   5/31/2019  9:55 AM Len Woodward MD [facility]   5/31/2019 11:15 AM Thora Cooks, PT SFEORPT SFE   9/13/2019  9:15 AM Wilma [facility]chez [facility]

## 2019-05-03 ENCOUNTER — HOSPITAL ENCOUNTER (OUTPATIENT)
Dept: PHYSICAL THERAPY | Age: 84
Discharge: HOME | End: 2019-05-03
Attending: INTERNAL MEDICINE
Payer: MEDICARE

## 2019-05-03 PROCEDURE — 97110 THERAPEUTIC EXERCISES: CPT

## 2019-05-03 NOTE — PROGRESS NOTES
Gracy Castrejon  : 1934  Primary: [address] Medic*  Secondary:  [address] at [facility]  2700 [facility], [address] [place] [address] [address], [facility] [address]  [phone]   [phone]     OUTPATIENT PHYSICAL THERAPY: Daily Treatment Note 5/3/2019  ICD-10: Treatment Diagnosis:   · Difficulty in walking, not elsewhere classified (R26.2)  · Muscle weakness (generalized) (M62.81)  Pre-treatment Symptoms/Complaints:  5/3/2019: Patient feels like she is slowly getting stronger. Pain: Initial: Pain Intensity 1: 0  Post Session:  0/10   Medications Last Reviewed:  5/3/2019  Updated Objective Findings:  None Today   TREATMENT:     THERAPEUTIC EXERCISE: (45 minutes):  Exercises per grid below to improve strength. Required minimal verbal cues to promote proper body alignment. Progressed repetitions as indicated. Date:  5/3/2019   Activity/Exercise Parameters   Hooklying lower trunk rotation X   Hooklying pelvic tilts X   Supine straight leg raises    Hooklying hip abduction X   Isometric hip flexion X   Standing hip flexion/march 15 reps  B LE   Standing hip extension 15 reps  B LE   Standing hip abduction 15 reps  B LE   Standing knee flexion 15 reps  B LE   Standing heel raises 15 reps  B LE   Standing toe raises 15 reps  B LE   Standing erect posture in parallel bard 1 minute   Step ups 6 inch  x10 reps   Sit to stand from hilo mat X    Nu-step 7 minutes  Level 3     Treatment/Session Summary:    · Response to Treatment:  Patient reports increased pain in right knee with step ups. Patient ended treatment early due to increased pain. · Communication/Consultation:  None today  · Equipment provided today:  None today  · Recommendations/Intent for next treatment session: Next visit will focus on strengthening exercises and balance exercises.   Treatment Plan of Care Effective Dates:  3/29/2019 to 2019  Total Treatment Billable Duration:  33 minutes  PT Patient Time In/Time Out  Time In: 1120  Time Out: [address], PT    Future Appointments   Date Time Provider Miley Tate   5/3/2019  1:00 PM VSA ULTRASOUND 2 SSA BSVS VSA   5/10/2019 11:15 AM Shania Cornell, PT SFEORPT SFE   5/17/2019 11:15 AM Shania Cornell, PT SFEORPT SFE   5/24/2019 11:15 AM Shania Cornell, PT SFEORPT SFE   5/31/2019  9:55 AM Soraida Woodward MD [facility] [place] [facility]   5/31/2019 11:15 AM Andre Warner, PT SFEORPT SFE   6/7/2019 11:15 AM Shania Cornell, PT SFEORPT SFE   6/14/2019 11:15 AM Shania Cornell, PT SFEORPT SFE   6/28/2019 11:15 AM Shania Cornell, PT SFEORPT SFE   9/13/2019  9:15 AM Sha Sánchez MD [facility] CAF CAF

## 2019-05-03 NOTE — PROGRESS NOTES
Noemi Costello  : 1934  Primary: [address] [facility], [facility] 91.  [phone]   [phone]        OUTPATIENT PHYSICAL THERAPY:Progress Report 5/3/2019   ICD-10: Treatment Diagnosis: Difficulty in walking, not elsewhere classified (R26.2)/Muscle weakness (generalized) (M62.81)  Precautions/Allergies:   Seafood and [place]   MD Orders: Evaluate and treat  MEDICAL/REFERRING DIAGNOSIS:  Unspecified abnormalities of gait and mobility [R26.9]  History of falling [Z91.81]  Other abnormalities of gait and mobility [R26.89]   DATE OF ONSET: Chronic   REFERRING PHYSICIAN: Lexa Sanchez*  RETURN PHYSICIAN APPOINTMENT: unknown      INITIAL ASSESSMENT:  Ms. Elba Rubio presents with decreased lower extremity strength/core strength, imbalance, and difficulty walking. Patient is at higher fall risk based on score received on Guerra Balance Scale. Patient would benefit from skilled physical therapy to address problems and goals. Thank you for this referral.     Progress note:  Patient attended four scheduled physical therapy appointments from 3/29/2019 to 5/3/2019. Patient canceled one appointment. Patient reports feeling stronger in her legs with daily activities. Patient would benefit from continuing skilled physical therapy to address problems and goals. Thank you for this referral.     PROBLEM LIST (Impacting functional limitations):  1. Decreased Strength  2. Decreased Transfer Abilities  3. Decreased Ambulation Ability/Technique  4. Decreased Balance  5. Decreased Activity Tolerance  6. Decreased [place] with Home Exercise Program INTERVENTIONS PLANNED: (Treatment may consist of any combination of the following)  1. Balance Exercise  2. Gait Training  3. Home Exercise Program (HEP)  4. Neuromuscular Re-education/Strengthening  5. Range of Motion (ROM)  6.  Therapeutic Exercise/Strengthening   TREATMENT PLAN:  Effective Dates: 3/29/2019 TO 6/27/2019 (90 days). Frequency/Duration: 1 time a week for 90 Day(s)  GOALS: (Goals have been discussed and agreed upon with patient.)  Short-Term Functional Goals: Time Frame: 30 days   1. Patient will be independent with home exercise program to improve strength and balance. Goal met. 2. Patient will increase score on Guerra Balance Scale to greater than or equal to 43/56 demonstrating improved balance and decreased fall risk with daily activities. Goal ongoing. Discharge Goals: Time Frame: 90 days   1. Patient will increase score on Guerra Balance Scale to greater than or equal to 46/56 demonstrating improved balance and decreased fall risk with daily activities. Goal ongoing. 2. Patient will report improved ease with mobility. Goal ongoing. 3. Patient will ambulate with rolling walker demonstrating erect posture. Goal ongoing. OUTCOME MEASURE:   Tool Used: Guerra Balance Scale  Score:  Initial: 38/56 Most Recent: 40/56 (Date: 5-3-2019)   Interpretation of Score: Each section is scored on a 0-4 scale, 0 representing the patients inability to perform the task and 4 representing independence. The scores of each section are added together for a total score of 56. The higher the patients score, the more independent the patient is. Any score below 45 indicates increased risk for falls. MEDICAL NECESSITY:   · Patient is expected to demonstrate progress in strength and balance to improve safety during activities of daily living. REASON FOR SERVICES/OTHER COMMENTS:  · Patient continues to require skilled intervention due to higher fall risk with daily activities.   Total Duration:  PT Patient Time In/Time Out  Time In: 1120  Time Out: 1153         PAIN/SUBJECTIVE:   Initial: Pain Intensity 1: 0  Post Session:  0/10   HISTORY:   History of Injury/Illness (Reason for Referral):  Patient complains of weakness, imbalance, and difficulty walking. Patient has had no falls over the past several months. Patient rates dizziness as 0/10 and pain level as 0/10. Patient has been using a rollator walker for ambulation over the past six months. Patient would like to get her legs and back stronger. Past Medical History/Comorbidities:   Ms. Kerry Douglas  has a past medical history of Abnormality of gait (5/20/2015), Allergic rhinitis (6/4/2013), Asymptomatic bilateral carotid artery stenosis (10/14/2014), Back problem (10/27/2015), Cerebrovascular disease (5/20/2015), Cerumen impaction, Essential hypertension, benign (6/4/2013), GI bleed, Glaucoma (5/20/2015), seasonal allergies, Hyperparathyroidism ([facility] (6/4/2013), Impaired fasting glucose (5/20/2015), Occlusion and stenosis of carotid artery without mention of cerebral infarction (5/20/2015), Osteoporosis, unspecified (6/4/2013), Other and unspecified hyperlipidemia (6/4/2013), Other decreased white blood cell count (6/4/2013), SNHL (sensorineural hearing loss) (6/4/2013), Stroke ([facility] Toxic multinodular goiter (6/4/2013), Toxic multinodular goiter without mention of thyrotoxic crisis or storm (6/4/2013), Transient ischemic attack (TIA), and cerebral infarction without residual deficits(V12.54) (6/4/2013), Unspecified hearing loss (6/4/2013), and Vitamin D deficiency. Ms. Kerry Douglas  has a past surgical history that includes hx colonoscopy; hx cataract removal (Bilateral); and hx heent. Social History/Living Environment:     Lives with daughter in a one story home. Patient has no steps to enter. Prior Level of Function/Work/Activity:  Independent. Retired. Dominant Side:         RIGHT  Personal Factors:          Sex:  female        Age:  80 y.o.    Ambulatory/Rehab Services H2 Model Falls Risk Assessment   Risk Factors:       (1)  Dizziness/Vertigo Ability to Rise from Chair:       (1)  Pushes up, successful in one attempt   Falls Prevention Plan:       No modifications necessary   Total: (5 or greater = High Risk): 2   ©2010 [facility] [place] [address]. [place] Patent #[id number]. Federal Law prohibits the replication, distribution or use without written permission from [facility] [employer]   Current Medications:       Current Outpatient Medications:     cinacalcet (SENSIPAR) 30 mg tablet, TAKE 1 TABLET BY MOUTH EVERY DAY, Disp: , Rfl: 0    irbesartan (AVAPRO) 300 mg tablet, Take 1 Tab by mouth nightly., Disp: 30 Tab, Rfl: 3    pravastatin (PRAVACHOL) 10 mg tablet, Take 1 Tab by mouth nightly., Disp: 90 Tab, Rfl: 3    methIMAzole (TAPAZOLE) 5 mg tablet, Take one tab 2 times per week, Disp: , Rfl:     ezetimibe (ZETIA) 10 mg tablet, Take 1 Tab by mouth daily. , Disp: 90 Tab, Rfl: 3    dorzolamide HCl/timolol maleat (DORZOLAMIDE-TIMOLOL OP), Apply 1 Drop to eye. Both eyes daily, Disp: , Rfl:     montelukast (SINGULAIR) 10 mg tablet, Take 1 Tab by mouth daily. , Disp: 90 Tab, Rfl: 3    clopidogrel (PLAVIX) 75 mg tab, Take 1 Tab by mouth daily. , Disp: 90 Tab, Rfl: 3    chlorthalidone (HYGROTEN) 25 mg tablet, TAKE 1/2 TABLET ONCE DAILY, Disp: 45 Tab, Rfl: 3    ibandronate (BONIVA) 150 mg tablet, Take 150 mg by mouth every thirty (30) days. , Disp: , Rfl:     polyethylene glycol (MIRALAX) 17 gram/dose powder, DISSOLVE 17 GRAMS INTO LIQUID THEN DRINK BY MOUHT ONCE A DAY as needed, Disp: , Rfl: 0    travoprost (TRAVATAN Z) 0.004 % ophthalmic solution, Administer 1 Drop to both eyes every evening., Disp: , Rfl:     ASCORBATE CALCIUM (VITAMIN C PO), Take  by mouth., Disp: , Rfl:     omega-3 fatty acids-vitamin e (FISH OIL) 1,000 mg cap, Take 1 Cap by mouth., Disp: , Rfl:     ECHINACEA PO, Take  by mouth., Disp: , Rfl:     Cholecalciferol, Vitamin D3, (VITAMIN D3) 1,000 unit cap, Take  by mouth. Every other day, Disp: , Rfl:     COD LIVER OIL PO, Take  by mouth., Disp: , Rfl:     brimonidine (ALPHAGAN P) 0.1 % ophthalmic solution, every eight (8) hours. , Disp: , Rfl:    fexofenadine (ALLEGRA) 180 mg tablet, Take  by mouth daily as needed. , Disp: , Rfl:    Date Last Reviewed:  3/29/2019   Number of Personal Factors/Comorbidities that affect the Plan of Care: 0: LOW COMPLEXITY   EXAMINATION:   Observation/Orthostatic Postural Assessment:          Patient stands with flexed forward posture/rounded shoulders. Functional Mobility:         Gait/Ambulation:  Patient ambulates with rollator walker demonstrating decreased gait speed. Strength:          Hip flexion right 4/5 and left 4+/5, hip abduction right 4/5 and left 4+/5, hip adduction right 4/5 and let 4+/5, quadriceps 5/5, hamstrings 5/5, ankle dorsiflexion 5/5, and ankle plantarflexion 5/5. Sensation:         Decreased right lower extremity. Postural Control & Balance:  · Guerra Balance Scale:  38/56.   (A score less than 45/56 indicates high risk of falls)        Body Structures Involved:  1. Muscles Body Functions Affected:  1. Neuromusculoskeletal Activities and Participation Affected:  1.  Mobility   Number of elements (examined above) that affect the Plan of Care: 3: MODERATE COMPLEXITY   CLINICAL PRESENTATION:   Presentation: Stable and uncomplicated: LOW COMPLEXITY   CLINICAL DECISION MAKING:   Use of outcome tool(s) and clinical judgement create a POC that gives a: Clear prediction of patient's progress: LOW COMPLEXITY

## 2019-05-10 ENCOUNTER — HOSPITAL ENCOUNTER (OUTPATIENT)
Dept: PHYSICAL THERAPY | Age: 84
Discharge: HOME | End: 2019-05-10
Attending: INTERNAL MEDICINE
Payer: MEDICARE

## 2019-05-10 PROCEDURE — 97110 THERAPEUTIC EXERCISES: CPT

## 2019-05-10 NOTE — PROGRESS NOTES
Gracy Castrejon  : 1934  Primary: [address] Medic*  Secondary:  [address] at [facility]  [address] [facility], [address] [place] [address] 83,[address] 101, [place] [address].  [phone]   [phone]     OUTPATIENT PHYSICAL THERAPY: Daily Treatment Note 5/10/2019  ICD-10: Treatment Diagnosis:   · Difficulty in walking, not elsewhere classified (R26.2)  · Muscle weakness (generalized) (M62.81)  Pre-treatment Symptoms/Complaints:  5/10/2019: Patient reports the rainy weather is affecting her. \"I feel a little pain in my right knee\". Pain: Initial: Pain Intensity 1: 1  Pain Location 1: Knee  Pain Orientation 1: Right  Post Session:  1/10   Medications Last Reviewed:  5/10/2019  Updated Objective Findings:  None Today   TREATMENT:     THERAPEUTIC EXERCISE: (45 minutes):  Exercises per grid below to improve strength. Required minimal verbal cues to promote proper body alignment. Progressed repetitions as indicated. Date:  5/10/2019   Activity/Exercise Parameters   Hooklying lower trunk rotation X 10 reps   Hooklying pelvic tilts X 10 reps   Supine straight leg raises 2 X 10 reps    Hooklying hip abduction Green theraband  2x10 reps   Isometric hip flexion 5 reps    Standing hip flexion/march    Standing hip extension    Standing hip abduction    Standing knee flexion    Standing heel raises 15 reps  B LE   Standing toe raises 15 reps  B LE   Standing erect posture in parallel bard 1 minute   Bridging 5 reps    Sit to stand from hilo mat X  10 reps   Nu-step 7 minutes  Level 4     Treatment/Session Summary:    · Response to Treatment:  Patient tolerated treatment with minimal complaints of increased knee pain. Patient reports fatigue after treatment. · Communication/Consultation:  None today  · Equipment provided today:  None today  · Recommendations/Intent for next treatment session: Next visit will focus on strengthening exercises and balance exercises.   Treatment Plan of Care Effective Dates:  3/29/2019 to 6/27/2019  Total Treatment Billable Duration:  45 minutes  PT Patient Time In/Time Out  Time In: 1115  Time Out: Jong Fischer, PT    Future Appointments   Date Time Provider Miley Lea   5/17/2019 11:15 AM Patricia Cornellon Zeb, PT SFEORPT SFE   5/24/2019 11:15 AM Vissage Jimmy Dam, PT SFEORPT SFE   5/31/2019  9:55 AM Gale Woodward MD [facility]NT[facility] [place] [facility]   5/31/2019 11:15 AM Kevin Alexis, PT SFE[facility]PT SFE   6/7/2019 11:15 AM Patricia Cornellon Zeb, PT SFEORPT SFE   6/14/2019 11:15 AM VisPatricia owenon Dam, PT SFEORPT SFE   6/28/2019 11:15 AM VisPatricia owenon Dam, PT SFEORPT SFE   9/13/2019  9:15 AM Sunny Arenas MD SSA CAFM CAFM   5/8/2020 12:30 PM VSAE ULTRASOUND SSA VSAE VSAE   5/8/2020  1:00 PM Armando France, JOSE SSA VSAE VSAE

## 2019-05-17 ENCOUNTER — HOSPITAL ENCOUNTER (OUTPATIENT)
Dept: PHYSICAL THERAPY | Age: 84
Discharge: HOME | End: 2019-05-17
Attending: INTERNAL MEDICINE
Payer: MEDICARE

## 2019-05-17 PROCEDURE — 97110 THERAPEUTIC EXERCISES: CPT

## 2019-05-17 NOTE — PROGRESS NOTES
Dalia Lam  : 1934  Primary: [address] Medic*  Secondary:  [address] at [facility]  27[address] [facility], [address] [place] [address],[address], [facility].  [phone]   [phone]     OUTPATIENT PHYSICAL THERAPY: Daily Treatment Note 2019  ICD-10: Treatment Diagnosis:   · Difficulty in walking, not elsewhere classified (R26.2)  · Muscle weakness (generalized) (M62.81)  Pre-treatment Symptoms/Complaints:  2019: Patient reports she is doing okay today. Pain: Initial: Pain Intensity 1: 0  Post Session:  0/10   Medications Last Reviewed:  2019  Updated Objective Findings:  None Today   TREATMENT:     THERAPEUTIC EXERCISE: (41 minutes):  Exercises per grid below to improve strength. Required minimal verbal cues to promote proper body alignment. Progressed repetitions as indicated. Date:  2019   Activity/Exercise Parameters   Hooklying lower trunk rotation X 10 reps   Hooklying pelvic tilts X 10 reps   Supine straight leg raises X 10 reps    Hooklying hip abduction Green theraband  2x10 reps   Isometric hip flexion 5 reps    Standing hip flexion/march X 10 reps B   Standing hip extension X 10 reps B   Standing hip abduction X 10 reps B   Standing knee flexion X 10 reps B   Standing heel raises 15 reps  B LE   Standing toe raises 15 reps  B LE   Standing erect posture in parallel bard X   Bridging X   Sit to stand from hilo mat 2 X 10 reps   Nu-step 8 minutes  Level 4     Treatment/Session Summary:    · Response to Treatment:  Patient demonstrates improved upright posture during exercises. · Communication/Consultation:  None today  · Equipment provided today:  None today  · Recommendations/Intent for next treatment session: Next visit will focus on strengthening exercises and balance exercises.   Treatment Plan of Care Effective Dates:  3/29/2019 to 2019  Total Treatment Billable Duration:  41 minutes  PT Patient Time In/Time Out  Time In: 1112  Time Out: [address], PT    Future Appointments   Date Time Provider Miley Tate   5/24/2019 11:15 AM Allison Wong, PT [facility]   5/31/2019  9:55 AM Santana Woodward MD [facility] [place] [facility]   5/31/2019 11:15 AM Allison Wong, PT SFEORPT SFE   6/7/2019 11:15 AM Kelly Cornell, PT SFEORPT SFE   6/14/2019 11:15 AM Kelly Cornell, PT SFEORPT SFE   6/28/2019 11:15 AM Kelly Cornell, PT SFEORPT SFE   9/13/2019  9:15 AM Keshawn Ojeda MD SSA CAFM CAFM   5/8/2020 12:30 PM VSAE ULTRASOUND SSA VSAE VSAE   5/8/2020  1:00 PM Melanie Zavala NP SSA VSAE VSAE

## 2019-05-24 ENCOUNTER — HOSPITAL ENCOUNTER (OUTPATIENT)
Dept: PHYSICAL THERAPY | Age: 84
Discharge: HOME | End: 2019-05-24
Attending: INTERNAL MEDICINE
Payer: MEDICARE

## 2019-05-24 PROCEDURE — 97110 THERAPEUTIC EXERCISES: CPT

## 2019-05-24 NOTE — PROGRESS NOTES
Noemi Costello  : 1934  Primary: [address] Medic*  Secondary:  [address] at [facility]  27[address] [facility], [address] [place] [address],[address], [place] [address].  [phone]   [phone]     OUTPATIENT PHYSICAL THERAPY: Daily Treatment Note 2019  ICD-10: Treatment Diagnosis:   · Difficulty in walking, not elsewhere classified (R26.2)  · Muscle weakness (generalized) (M62.81)  Pre-treatment Symptoms/Complaints:  2019: Patient reports she is doing well. Pain: Initial: Pain Intensity 1: 0  Post Session:  0/10   Medications Last Reviewed:  2019  Updated Objective Findings:  None Today   TREATMENT:     THERAPEUTIC EXERCISE: (43 minutes):  Exercises per grid below to improve strength. Required minimal verbal cues to promote proper body alignment. Progressed repetitions as indicated. Date:  2019   Activity/Exercise Parameters   Hooklying lower trunk rotation    Hooklying pelvic tilts    Supine straight leg raises    Hooklying hip abduction    Isometric hip flexion    Standing hip flexion/march X 15 reps B   Standing hip extension X 15 reps B   Standing hip abduction X 15 reps B   Standing knee flexion X 15 reps B   Standing heel raises 15 reps  B LE   Standing toe raises 15 reps  B LE   Seated knee extension X 15 reps   Walking with rollator walker with upright posture In hallway wall   Sit to stand from hilo mat 2 X 10 reps   Seated scapular retraction/A exercise Red theraband  2x10 reps   Nu-step 8 minutes  Level 4     Treatment/Session Summary:    · Response to Treatment:  Patient progressing well with therapy. Patient needed several rest breaks due to increased fatigue. · Communication/Consultation:  None today  · Equipment provided today:  None today  · Recommendations/Intent for next treatment session: Next visit will focus on strengthening exercises and balance exercises.   Treatment Plan of Care Effective Dates:  3/29/2019 to 2019  Total Treatment Billable Duration:  43 minutes  PT Patient Time In/Time Out  Time In: 1113  Time Out: 111 [facility], PT    Future Appointments   Date Time Provider Miley Batresi   5/31/2019  9:55 AM Armando Woodward MD [facility]NT[facility] [place] [facility]   5/31/2019  1:00 PM Raquel Shafer, PT [facility] SFE   6/7/2019 11:15 AM Vivi Cornell, PT SFEORPT SFE   6/14/2019 11:15 AM Vivi Cornell, PT [facility]ORPT SFE   6/28/2019 11:15 AM Vivi Cornell, PT S[facility]ORPT SFE   9/13/2019  9:15 AM Sari Sharma MD SSA CAFM CAFM   5/8/2020 12:30 PM VSAE ULTRASOUND SSA VSAE VSAE   5/8/2020  1:00 PM Alec Lopez NP SSA VSAE VSAE

## 2019-05-31 ENCOUNTER — HOSPITAL ENCOUNTER (OUTPATIENT)
Dept: PHYSICAL THERAPY | Age: 84
Discharge: HOME | End: 2019-05-31
Attending: INTERNAL MEDICINE
Payer: MEDICARE

## 2019-05-31 PROCEDURE — 97110 THERAPEUTIC EXERCISES: CPT

## 2019-05-31 NOTE — PROGRESS NOTES
Fede Brown  : 1934  Primary: [address] Medic*  Secondary:  [address] at [facility]  27[address] [facility], [address] [place] [address] 83,[address] 019, [place] [address].  [phone]   [phone]     OUTPATIENT PHYSICAL THERAPY: Daily Treatment Note 2019  ICD-10: Treatment Diagnosis:   · Difficulty in walking, not elsewhere classified (R26.2)  · Muscle weakness (generalized) (M62.81)  Pre-treatment Symptoms/Complaints:  2019: No complaints. Pain: Initial: Pain Intensity 1: 0  Post Session:  0/10   Medications Last Reviewed:  2019  Updated Objective Findings:  None Today   TREATMENT:     THERAPEUTIC EXERCISE: (40 minutes):  Exercises per grid below to improve strength. Required minimal verbal cues to promote proper body alignment. Progressed repetitions as indicated. Date:  2019   Activity/Exercise Parameters   Hooklying lower trunk rotation    Hooklying pelvic tilts    Supine straight leg raises    Hooklying hip abduction    Step ups 6 inch  X 10 reps   Standing hip flexion/march X 15 reps B 1#   Standing hip extension X 15 reps B 1#   Standing hip abduction X 15 reps B 1 #   Standing knee flexion X 15 reps B 1#   Standing heel raises 15 reps  B LE    Standing toe raises 15 reps  B LE   Seated knee extension X 15 reps   Walking with rollator walker with upright posture X   Sit to stand from hilo mat 2 X 10 reps   Seated scapular retraction/A exercise Green theraband  2x10 reps   Nu-step 8 minutes  Level 4     Treatment/Session Summary:    · Response to Treatment:  Patient tolerated without complaints. · Communication/Consultation:  None today  · Equipment provided today:  None today  · Recommendations/Intent for next treatment session: Next visit will focus on strengthening exercises and balance exercises.   Treatment Plan of Care Effective Dates:  3/29/2019 to 2019  Total Treatment Billable Duration:  40 minutes  PT Patient Time In/Time Out  Time In: 5100  Time Out: Sean 94    Future Appointments   Date Time Provider Miley Lea   6/7/2019 11:15 AM Sidney Rodriguez, PT [facility] SFE   6/14/2019 11:15 AM Kosta Cornell, PT [facility]   6/28/2019 11:15 AM Kosta Cornell, PT SFEORPT SFE   9/13/2019  9:15 AM Sergio Parker MD SSA CAFM CAFM   5/8/2020 12:30 PM VSAE ULTRASOUND SSA VSAE VSAE   5/8/2020  1:00 PM Toshia Rodriguez NP SSA VSAE VSAE

## 2019-06-07 ENCOUNTER — HOSPITAL ENCOUNTER (OUTPATIENT)
Dept: PHYSICAL THERAPY | Age: 84
Discharge: HOME | End: 2019-06-07
Attending: INTERNAL MEDICINE
Payer: MEDICARE

## 2019-06-07 PROCEDURE — 97110 THERAPEUTIC EXERCISES: CPT

## 2019-06-07 NOTE — PROGRESS NOTES
Sahara Lugo  : 1934  Primary: [address] Medic*  Secondary:  [address] at [facility]  [address], [address], [facility]  [phone]   [phone]     OUTPATIENT PHYSICAL THERAPY: Daily Treatment Note 2019  ICD-10: Treatment Diagnosis:   · Difficulty in walking, not elsewhere classified (R26.2)  · Muscle weakness (generalized) (M62.81)  Pre-treatment Symptoms/Complaints:  2019: Patient reports she is doing okay. Pain: Initial: Pain Intensity 1: 0  Post Session:  0/10   Medications Last Reviewed:  2019  Updated Objective Findings:  None Today   TREATMENT:     THERAPEUTIC EXERCISE: (45 minutes):  Exercises per grid below to improve strength. Required minimal verbal cues to promote proper body alignment. Progressed repetitions as indicated. Date:  2019   Activity/Exercise Parameters   Hooklying lower trunk rotation    Hooklying pelvic tilts    Supine straight leg raises    Hooklying hip abduction    Step ups 6 inch  2 X 10 reps   Standing hip flexion/march X 15 reps B 2#   Standing hip extension X 15 reps B 2#   Standing hip abduction X 15 reps B 2 #   Standing knee flexion X 15 reps B 2#   Standing heel raises 15 reps  B LE    Standing toe raises 15 reps  B LE   Seated knee extension X 15 2# reps   Walking with rollator walker with upright posture X   Sit to stand from hilo mat 2 X 10 reps   Seated scapular retraction/A exercise Green theraband  2x10 reps   Nu-step 8 minutes  Level 5     Treatment/Session Summary:    · Response to Treatment:  Patient tolerated resistance on Nustep and increased weights with bilateral lower extremity demonstrating improved strength. · Communication/Consultation:  None today  · Equipment provided today:  None today  · Recommendations/Intent for next treatment session: Next visit will focus on strengthening exercises and balance exercises.   Treatment Plan of Care Effective Dates:  3/29/2019 to 6/27/2019  Total Treatment Billable Duration:   45 minutes  PT Patient Time In/Time Out  Time In: 1115  Time Out: Jong Fischer, SAMMY    Future Appointments   Date Time Provider Miley Tate   6/14/2019 11:15 AM Vanesa Cornell Males, PT SFEORPT SFE   6/28/2019 11:15 AM Vanesa Cornell Males, PT SFEORPT SFE   9/13/2019  9:15 AM Julia Mckinnon MD SSA CAFM CAFM   5/8/2020 12:30 PM VSAE ULTRASOUND SSA VSAE VSAE   5/8/2020  1:00 PM Danya Quiñonez NP SSA VSAE VSAE

## 2019-06-14 ENCOUNTER — HOSPITAL ENCOUNTER (OUTPATIENT)
Dept: PHYSICAL THERAPY | Age: 84
Discharge: HOME | End: 2019-06-14
Attending: INTERNAL MEDICINE
Payer: MEDICARE

## 2019-06-14 PROCEDURE — 97110 THERAPEUTIC EXERCISES: CPT

## 2019-06-14 NOTE — PROGRESS NOTES
Camille Cohn  : 1934  Primary: [address] Medic*  Secondary:  [address] at [facility]  [address] [facility], [address], [place] [address].  [phone]   [phone]     OUTPATIENT PHYSICAL THERAPY: Daily Treatment Note 2019  ICD-10: Treatment Diagnosis:   · Difficulty in walking, not elsewhere classified (R26.2)  · Muscle weakness (generalized) (M62.81)  Pre-treatment Symptoms/Complaints:  2019: Patient has no complaints. No falls  Pain: Initial: Pain Intensity 1: 0  Post Session:  0/10   Medications Last Reviewed:  2019  Updated Objective Findings:  See recertification   TREATMENT:     THERAPEUTIC EXERCISE: (45 minutes):  Exercises per grid below to improve strength. Required minimal verbal cues to promote proper body alignment. Progressed repetitions as indicated. Date:  2019   Activity/Exercise Parameters   Hooklying lower trunk rotation    Hooklying pelvic tilts    Supine straight leg raises    Guerra Balance Master 15 minutes   Step ups 6 inch  2 X 10 reps   Standing hip flexion/march    Standing hip extension    Standing hip abduction    Standing knee flexion    Standing heel raises 15 reps  B LE    Standing toe raises 15 reps  B LE   Seated knee extension X 15 2# reps   Walking with rollator walker with upright posture X   Sit to stand from hilo mat 2 X 10 reps   Seated scapular retraction/A exercise Green theraband  2x10 reps   Nu-step 8 minutes  Level 5     Treatment/Session Summary:    · Response to Treatment:  Patient demonstrates improved balance since beginning therapy. · Communication/Consultation:  None today  · Equipment provided today:  None today  · Recommendations/Intent for next treatment session: Next visit will focus on strengthening exercises and balance exercises.   Treatment Plan of Care Effective Dates:  3/29/2019 to 2019  Total Treatment Billable Duration:   45 minutes  PT Patient Time In/Time Out  Time In: 1115  Time Out: Jong Fischer, PT    Future Appointments   Date Time Provider Miley Tate   6/28/2019 11:15 AM Shabbir Nogueira PT [facility]   7/5/2019  8:15 AM Daiana Bartholomew MD [facility]   7/12/2019  9:30 AM [facility] ULTR[facility]OUND 1 [facility]   7/12/2019 10:30 AM Yanelis Carmichael NP [facility]   9/18/2019 10:30 AM Daiana Bartholomew MD [facility]   5/8/2020 12:30 PM [facility] ULTRASOUND [facility]   5/8/2020  1:00 PM Laverne Flores NP [facility]

## 2019-06-14 NOTE — THERAPY RECERTIFICATION
Francisco Encarnacion  : 1934  Primary: [address] Medic*  Secondary:  [address] at [facility]  [address], [address], [place] [place]  [phone]   [phone]        OUTPATIENT PHYSICAL [address]    ICD-10: Treatment Diagnosis: Difficulty in walking, not elsewhere classified (R26.2)/Muscle weakness (generalized) (M62.81)  Precautions/Allergies:   Seafood and [place]   MD Orders: Evaluate and treat  MEDICAL/REFERRING DIAGNOSIS:  Unspecified abnormalities of gait and mobility [R26.9]  History of falling [Z91.81]  Other abnormalities of gait and mobility [R26.89]   DATE OF ONSET: Chronic   REFERRING PHYSICIAN: Franc Morrow*  RETURN PHYSICIAN APPOINTMENT: unknown      INITIAL ASSESSMENT:  Ms. Og Chen presents with decreased lower extremity strength/core strength, imbalance, and difficulty walking. Patient is at higher fall risk based on score received on Guerra Balance Scale. Patient would benefit from skilled physical therapy to address problems and goals. Thank you for this referral.     Recertification note:  Patient has attended ten scheduled physical therapy appointments from 3/29/2019 to 2019. Patient canceled one appointment. Patient reports feeling stronger in her legs with daily activities. Patient demonstrates improved balance since beginning therapy. Patient would benefit from continuing skilled physical therapy to address problems and goals. Thank you for this referral.     PROBLEM LIST (Impacting functional limitations):  1. Decreased Strength  2. Decreased Transfer Abilities  3. Decreased Ambulation Ability/Technique  4. Decreased Balance  5. Decreased Activity Tolerance  6. Decreased [place] with Home Exercise Program INTERVENTIONS PLANNED: (Treatment may consist of any combination of the following)  1. Balance Exercise  2. Gait Training  3. Home Exercise Program (HEP)  4.  Neuromuscular Re-education/Strengthening  5. Range of Motion (ROM)  6. Therapeutic Exercise/Strengthening   TREATMENT PLAN:  Effective Dates: 6/14/2019 TO 8/13/2019 (60 days). Frequency/Duration: 1 time a week for 60 Days    GOALS: (Goals have been discussed and agreed upon with patient.)  Short-Term Functional Goals: Time Frame: 30 days   1. Patient will be independent with home exercise program to improve strength and balance. Goal met. 2. Patient will increase score on Guerra Balance Scale to greater than or equal to 43/56 demonstrating improved balance and decreased fall risk with daily activities. Goal met. Discharge Goals: Time Frame: 90 days   1. Patient will increase score on Guerra Balance Scale to greater than or equal to 46/56 demonstrating improved balance and decreased fall risk with daily activities. Goal ongoing. 2. Patient will report improved ease with mobility. Goal met. 3. Patient will ambulate with rolling walker demonstrating erect posture. Goal ongoing. OUTCOME MEASURE:   Tool Used: Guerra Balance Scale  Score:  Initial: 38/56 Most Recent: 43/56 (Date: 6-)   Interpretation of Score: Each section is scored on a 0-4 scale, 0 representing the patients inability to perform the task and 4 representing independence. The scores of each section are added together for a total score of 56. The higher the patients score, the more independent the patient is. Any score below 45 indicates increased risk for falls. MEDICAL NECESSITY:   · Patient is expected to demonstrate progress in strength and balance to improve safety during activities of daily living. REASON FOR SERVICES/OTHER COMMENTS:  · Patient continues to require skilled intervention due to higher fall risk with daily activities.   Total Duration:  PT Patient Time In/Time Out  Time In: 1115  Time Out: 1200     Regarding [address] therapy, I certify that the treatment plan above will be carried out by a therapist or under their direction. Thank you for this referral,  Linnea Jiménez, PT     Referring Physician Signature: Marion Boo*          Date                   PAIN/SUBJECTIVE:   Initial: Pain Intensity 1: 0  Post Session:  0/10   HISTORY:   History of Injury/Illness (Reason for Referral):  Patient complains of weakness, imbalance, and difficulty walking. Patient has had no falls over the past several months. Patient rates dizziness as 0/10 and pain level as 0/10. Patient has been using a rollator walker for ambulation over the past six months. Patient would like to get her legs and back stronger. Past Medical History/Comorbidities:   Ms. Violeta Cervantes  has a past medical history of Abnormality of gait (5/20/2015), Allergic rhinitis (6/4/2013), Asymptomatic bilateral carotid artery stenosis (10/14/2014), Back problem (10/27/2015), Cerebrovascular disease (5/20/2015), Cerumen impaction, Essential hypertension, benign (6/4/2013), GI bleed, Glaucoma (5/20/2015), seasonal allergies, Hyperparathyroidism ([facility] [address] (6/4/2013), Impaired fasting glucose (5/20/2015), Occlusion and stenosis of carotid artery without mention of cerebral infarction (5/20/2015), Osteoporosis, unspecified (6/4/2013), Other and unspecified hyperlipidemia (6/4/2013), Other decreased white blood cell count (6/4/2013), SNHL (sensorineural hearing loss) (6/4/2013), Stroke ([facility] [address] Toxic multinodular goiter (6/4/2013), Toxic multinodular goiter without mention of thyrotoxic crisis or storm (6/4/2013), Transient ischemic attack (TIA), and cerebral infarction without residual deficits(V12.54) (6/4/2013), Unspecified hearing loss (6/4/2013), and Vitamin D deficiency. Ms. Violeta Cervantes  has a past surgical history that includes hx colonoscopy; hx cataract removal (Bilateral); and hx heent. Social History/Living Environment:     Lives with daughter in a one story home. Patient has no steps to enter. Prior Level of Function/Work/Activity:  Independent. Retired. Dominant Side:         RIGHT  Personal Factors:          Sex:  female        Age:  80 y.o. Ambulatory/Rehab Services H2 Model Falls Risk Assessment   Risk Factors:       (1)  Dizziness/Vertigo Ability to Rise from Chair:       (1)  Pushes up, successful in one attempt   Falls Prevention Plan:       No modifications necessary   Total: (5 or greater = High Risk): 2   ©2010 [facility] [place]. All Rights Reserved. [facility] [employer] Patent #[id number]. Federal Law prohibits the replication, distribution or use without written permission from [facility] charity: water   Current Medications:       Current Outpatient Medications:     clopidogrel (PLAVIX) 75 mg tab, Take 1 Tab by mouth daily. , Disp: 90 Tab, Rfl: 3    irbesartan (AVAPRO) 300 mg tablet, Take 1 Tab by mouth nightly., Disp: 30 Tab, Rfl: 3    montelukast (SINGULAIR) 10 mg tablet, Take 1 Tab by mouth daily. , Disp: 90 Tab, Rfl: 3    cinacalcet (SENSIPAR) 30 mg tablet, TAKE 1 TABLET BY MOUTH EVERY DAY, Disp: , Rfl: 0    pravastatin (PRAVACHOL) 10 mg tablet, Take 1 Tab by mouth nightly., Disp: 90 Tab, Rfl: 3    methIMAzole (TAPAZOLE) 5 mg tablet, Take one tab 2 times per week, Disp: , Rfl:     ezetimibe (ZETIA) 10 mg tablet, Take 1 Tab by mouth daily. , Disp: 90 Tab, Rfl: 3    dorzolamide HCl/timolol maleat (DORZOLAMIDE-TIMOLOL OP), Apply 1 Drop to eye. Both eyes daily, Disp: , Rfl:     chlorthalidone (HYGROTEN) 25 mg tablet, TAKE 1/2 TABLET ONCE DAILY, Disp: 45 Tab, Rfl: 3    ibandronate (BONIVA) 150 mg tablet, Take 150 mg by mouth every thirty (30) days. , Disp: , Rfl:     polyethylene glycol (MIRALAX) 17 gram/dose powder, DISSOLVE 17 GRAMS INTO LIQUID THEN DRINK BY MOUHT ONCE A DAY as needed, Disp: , Rfl: 0    travoprost (TRAVATAN Z) 0.004 % ophthalmic solution, Administer 1 Drop to both eyes every evening., Disp: , Rfl:     ASCORBATE CALCIUM (VITAMIN C PO), Take  by mouth., Disp: , Rfl:     omega-3 fatty acids-vitamin e (FISH OIL) 1,000 mg cap, Take 1 Cap by mouth., Disp: , Rfl:     ECHINACEA PO, Take  by mouth., Disp: , Rfl:     Cholecalciferol, Vitamin D3, (VITAMIN D3) 1,000 unit cap, Take  by mouth. Every other day, Disp: , Rfl:     COD LIVER OIL PO, Take  by mouth., Disp: , Rfl:     brimonidine (ALPHAGAN P) 0.1 % ophthalmic solution, every eight (8) hours. , Disp: , Rfl:     fexofenadine (ALLEGRA) 180 mg tablet, Take  by mouth daily as needed. , Disp: , Rfl:    Date Last Reviewed:  6/14/2019   Number of Personal Factors/Comorbidities that affect the Plan of Care: 0: LOW COMPLEXITY   EXAMINATION:   Observation/Orthostatic Postural Assessment:          Patient stands with flexed forward posture/rounded shoulders. Functional Mobility:         Gait/Ambulation:  Patient ambulates with rollator walker demonstrating decreased gait speed. Strength:          Hip flexion right 4/5 and left 4+/5, hip abduction right 4/5 and left 4+/5, hip adduction right 4/5 and let 4+/5, quadriceps 5/5, hamstrings 5/5, ankle dorsiflexion 5/5, and ankle plantarflexion 5/5. Sensation:         Decreased right lower extremity. Postural Control & Balance:  · Guerra Balance Scale:  43/56.   (A score less than 45/56 indicates high risk of falls). Score improved from 38/56 on initial evaluation. Body Structures Involved:  1. Muscles Body Functions Affected:  1. Neuromusculoskeletal Activities and Participation Affected:  1.  Mobility   Number of elements (examined above) that affect the Plan of Care: 3: MODERATE COMPLEXITY   CLINICAL PRESENTATION:   Presentation: Stable and uncomplicated: LOW COMPLEXITY   CLINICAL DECISION MAKING:   Use of outcome tool(s) and clinical judgement create a POC that gives a: Clear prediction of patient's progress: LOW COMPLEXITY

## 2019-06-28 ENCOUNTER — HOSPITAL ENCOUNTER (OUTPATIENT)
Dept: PHYSICAL THERAPY | Age: 84
Discharge: HOME | End: 2019-06-28
Attending: INTERNAL MEDICINE
Payer: MEDICARE

## 2019-06-28 PROCEDURE — 97110 THERAPEUTIC EXERCISES: CPT

## 2019-06-28 NOTE — THERAPY DISCHARGE
Vanessa Almonte  : 1934  Primary: [address] at [place] [id number] [facility], [address] [place] [address],[address], [place] [address].  [phone]   [phone]        OUTPATIENT PHYSICAL THERAPY:Discharge Summary 2019   ICD-10: Treatment Diagnosis: Difficulty in walking, not elsewhere classified (R26.2)/Muscle weakness (generalized) (M62.81)  Precautions/Allergies:   Seafood and [place]   MD Orders: Evaluate and treat  MEDICAL/REFERRING DIAGNOSIS:  Unspecified abnormalities of gait and mobility [R26.9]  History of falling [Z91.81]  Other abnormalities of gait and mobility [R26.89]   DATE OF ONSET: Chronic   REFERRING PHYSICIAN: Luli Vaughn*  RETURN PHYSICIAN APPOINTMENT: unknown      INITIAL ASSESSMENT:  Ms. Stasia Severe presents with decreased lower extremity strength/core strength, imbalance, and difficulty walking. Patient is at higher fall risk based on score received on Guerra Balance Scale. Patient would benefit from skilled physical therapy to address problems and goals. Thank you for this referral.     Discharge note:  [facility]. Patient canceled one appointment. Patient reports feeling stronger in her legs with daily activities. Patient demonstrates improved balance since beginning therapy. Patient feels comfortable continuing exercises independently. Patient discharged from physical therapy. Thank you for this referral.       PROBLEM LIST (Impacting functional limitations):  1. Decreased Strength  2. Decreased Transfer Abilities  3. Decreased Ambulation Ability/Technique  4. Decreased Balance  5. Decreased Activity Tolerance  6. Decreased [place] with Home Exercise Program INTERVENTIONS PLANNED: (Treatment may consist of any combination of the following)  1. Balance Exercise  2. Gait Training  3.  Home Exercise Program (HEP)  4. Neuromuscular Re-education/Strengthening  5. Range of Motion (ROM)  6. Therapeutic Exercise/Strengthening   TREATMENT PLAN:  Effective Dates: 6/14/2019 TO 8/13/2019 (60 days). Frequency/Duration: Patient discharged from physical therapy. GOALS: (Goals have been discussed and agreed upon with patient.)  Short-Term Functional Goals: Time Frame: 30 days   1. Patient will be independent with home exercise program to improve strength and balance. Goal met. 2. Patient will increase score on Guerra Balance Scale to greater than or equal to 43/56 demonstrating improved balance and decreased fall risk with daily activities. Goal met. Discharge Goals: Time Frame: 90 days   1. Patient will increase score on Guerra Balance Scale to greater than or equal to 46/56 demonstrating improved balance and decreased fall risk with daily activities. Goal not met. 2. Patient will report improved ease with mobility. Goal met. 3. Patient will ambulate with rolling walker demonstrating erect posture. Goal met. OUTCOME MEASURE:   Tool Used: Guerra Balance Scale  Score:  Initial: 38/56 Most Recent: 43/56 (Date: 6-)   Interpretation of Score: Each section is scored on a 0-4 scale, 0 representing the patients inability to perform the task and 4 representing independence. The scores of each section are added together for a total score of 56. The higher the patients score, the more independent the patient is. Any score below 45 indicates increased risk for falls. PAIN/SUBJECTIVE:   Initial: Pain Intensity 1: 0  Post Session:  0/10   HISTORY:   History of Injury/Illness (Reason for Referral):  Patient complains of weakness, imbalance, and difficulty walking. Patient has had no falls over the past several months. Patient rates dizziness as 0/10 and pain level as 0/10. Patient has been using a rollator walker for ambulation over the past six months. Patient would like to get her legs and back stronger. Past Medical History/Comorbidities:   Ms. Davis Brock  has a past medical history of Abnormality of gait (5/20/2015), Allergic rhinitis (6/4/2013), Asymptomatic bilateral carotid artery stenosis (10/14/2014), Back problem (10/27/2015), Cerebrovascular disease (5/20/2015), Cerumen impaction, Essential hypertension, benign (6/4/2013), GI bleed, Glaucoma (5/20/2015), seasonal allergies, Hyperparathyroidism ([facility] [address] (6/4/2013), Impaired fasting glucose (5/20/2015), Occlusion and stenosis of carotid artery without mention of cerebral infarction (5/20/2015), Osteoporosis, unspecified (6/4/2013), Other and unspecified hyperlipidemia (6/4/2013), Other decreased white blood cell count (6/4/2013), SNHL (sensorineural hearing loss) (6/4/2013), Stroke ([facility] [address] Toxic multinodular goiter (6/4/2013), Toxic multinodular goiter without mention of thyrotoxic crisis or storm (6/4/2013), Transient ischemic attack (TIA), and cerebral infarction without residual deficits(V12.54) (6/4/2013), Unspecified hearing loss (6/4/2013), and Vitamin D deficiency. Ms. Davis Brock  has a past surgical history that includes hx colonoscopy; hx cataract removal (Bilateral); and hx heent. Social History/Living Environment:     Lives with daughter in a one story home. Patient has no steps to enter. Prior Level of Function/Work/Activity:  Independent. Retired. Dominant Side:         RIGHT  Personal Factors:          Sex:  female        Age:  80 y.o. Ambulatory/Rehab Services H2 Model Falls Risk Assessment   Risk Factors:       (1)  Dizziness/Vertigo Ability to Rise from Chair:       (1)  Pushes up, successful in one attempt   Falls Prevention Plan:       No modifications necessary   Total: (5 or greater = High Risk): 2   ©2010 [facility] [employer]. All Rights Reserved. [facility] Patent #[id number].  Federal Law prohibits the replication, distribution or use without written permission from [facility] [employer]   Current Medications:       Current Outpatient Medications:     chlorthalidone (HYGROTEN) 25 mg tablet, TAKE 1/2 TABLET ONCE DAILY, Disp: 45 Tab, Rfl: 3    clopidogrel (PLAVIX) 75 mg tab, Take 1 Tab by mouth daily. , Disp: 90 Tab, Rfl: 3    irbesartan (AVAPRO) 300 mg tablet, Take 1 Tab by mouth nightly., Disp: 30 Tab, Rfl: 3    montelukast (SINGULAIR) 10 mg tablet, Take 1 Tab by mouth daily. , Disp: 90 Tab, Rfl: 3    cinacalcet (SENSIPAR) 30 mg tablet, TAKE 1 TABLET BY MOUTH EVERY DAY, Disp: , Rfl: 0    pravastatin (PRAVACHOL) 10 mg tablet, Take 1 Tab by mouth nightly., Disp: 90 Tab, Rfl: 3    methIMAzole (TAPAZOLE) 5 mg tablet, Take one tab 2 times per week, Disp: , Rfl:     ezetimibe (ZETIA) 10 mg tablet, Take 1 Tab by mouth daily. , Disp: 90 Tab, Rfl: 3    dorzolamide HCl/timolol maleat (DORZOLAMIDE-TIMOLOL OP), Apply 1 Drop to eye. Both eyes daily, Disp: , Rfl:     ibandronate (BONIVA) 150 mg tablet, Take 150 mg by mouth every thirty (30) days. , Disp: , Rfl:     polyethylene glycol (MIRALAX) 17 gram/dose powder, DISSOLVE 17 GRAMS INTO LIQUID THEN DRINK BY MOUHT ONCE A DAY as needed, Disp: , Rfl: 0    travoprost (TRAVATAN Z) 0.004 % ophthalmic solution, Administer 1 Drop to both eyes every evening., Disp: , Rfl:     ASCORBATE CALCIUM (VITAMIN C PO), Take  by mouth., Disp: , Rfl:     omega-3 fatty acids-vitamin e (FISH OIL) 1,000 mg cap, Take 1 Cap by mouth., Disp: , Rfl:     ECHINACEA PO, Take  by mouth., Disp: , Rfl:     Cholecalciferol, Vitamin D3, (VITAMIN D3) 1,000 unit cap, Take  by mouth. Every other day, Disp: , Rfl:     COD LIVER OIL PO, Take  by mouth., Disp: , Rfl:     brimonidine (ALPHAGAN P) 0.1 % ophthalmic solution, every eight (8) hours. , Disp: , Rfl:     fexofenadine (ALLEGRA) 180 mg tablet, Take  by mouth daily as needed. , Disp: , Rfl:       Number of Personal Factors/Comorbidities that affect the Plan of Care: 0: LOW COMPLEXITY   EXAMINATION:   Observation/Orthostatic Postural Assessment:          Patient stands with flexed forward posture/rounded shoulders. Functional Mobility:         Gait/Ambulation:  Patient ambulates with rollator walker demonstrating decreased gait speed. Strength:          Hip flexion right 4/5 and left 4+/5, hip abduction right 4/5 and left 4+/5, hip adduction right 4/5 and let 4+/5, quadriceps 5/5, hamstrings 5/5, ankle dorsiflexion 5/5, and ankle plantarflexion 5/5. Sensation:         Decreased right lower extremity. Postural Control & Balance:  · Guerra Balance Scale:  43/56.   (A score less than 45/56 indicates high risk of falls). Score improved from 38/56 on initial evaluation. Body Structures Involved:  1. Muscles Body Functions Affected:  1. Neuromusculoskeletal Activities and Participation Affected:  1.  Mobility   Number of elements (examined above) that affect the Plan of Care: 3: MODERATE COMPLEXITY   CLINICAL PRESENTATION:   Presentation: Stable and uncomplicated: LOW COMPLEXITY   CLINICAL DECISION MAKING:   Use of outcome tool(s) and clinical judgement create a POC that gives a: Clear prediction of patient's progress: LOW COMPLEXITY

## 2019-06-28 NOTE — PROGRESS NOTES
Shawn Wallis  : 1934  Primary: [address] [facility]*  Secondary:  [address] at [facility]  [address] [facility], [address] [place] [address],[address], [facility] [address]  [phone]   [phone]     OUTPATIENT PHYSICAL THERAPY: Daily Treatment Note 2019  ICD-10: Treatment Diagnosis:   · Difficulty in walking, not elsewhere classified (R26.2)  · Muscle weakness (generalized) (M62.81)  Pre-treatment Symptoms/Complaints:  2019: Patient doing well. \"I have been doing my exercises\". Pain: Initial: Pain Intensity 1: 0  Post Session:  0/10   Medications Last Reviewed:  2019  Updated Objective Findings:  See discharge note   TREATMENT:     THERAPEUTIC EXERCISE: (45 minutes):  Exercises per grid below to improve strength. Required minimal verbal cues to promote proper body alignment. Progressed repetitions as indicated. Date:  2019   Activity/Exercise Parameters   Hooklying lower trunk rotation    Hooklying pelvic tilts    Supine straight leg raises        Step ups 6 inch  2 X 10 reps   Standing hip flexion/march X 15 2# reps   Standing hip extension X 15 2# reps   Standing hip abduction X 15 2# reps   Standing knee flexion X 15 2# reps   Standing heel raises 15 reps  B LE    Standing toe raises 15 reps  B LE   Seated knee extension X 15 2# reps   Walking with rollator walker with upright posture X   Sit to stand from hilo mat X   Seated scapular retraction/A exercise Green theraband  2x10 reps   Nu-step 8 minutes  Level 5     Treatment/Session Summary:    · Response to Treatment:  Patient tolerated treatment without complaints. · Communication/Consultation:  None today  · Equipment provided today:  None today  · Recommendations/Intent for next treatment session: Patient discharged from physical therapy. Treatment Plan of Care Effective Dates:  Effective Dates: 2019 TO 2019 (60 days).    Total Treatment Billable Duration:   45 minutes  PT Patient Time In/Time Out  Time In: 1115  Time Out: Jong Fischer, PT    Future Appointments   Date Time Provider Miley Batresi   7/5/2019 10:45 AM [facility]   7/12/2019  9:30 AM [facility] ULTRASOUND 1 [facility]   7/12/2019 10:30 AM Rebekah Carmichael NP [facility]   9/18/2019 10:30 AM Jax Mcgregor MD [facility]   5/8/2020 12:30 PM [facility]SA[facility] ULTRASOUND [facility]   5/8/2020  1:00 PM Zbigniew Su NP [facility]

## 2021-02-22 PROBLEM — R09.89 DECREASED PEDAL PULSES: Status: ACTIVE | Noted: 2021-02-22

## 2021-07-29 PROBLEM — I73.9 PAD (PERIPHERAL ARTERY DISEASE) (HCC): Status: ACTIVE | Noted: 2021-07-29

## 2021-12-07 ENCOUNTER — HOSPITAL ENCOUNTER (EMERGENCY)
Age: 86
Discharge: HOME | End: 2021-12-07
Attending: EMERGENCY MEDICINE
Payer: MEDICARE

## 2021-12-07 VITALS
TEMPERATURE: 97.9 F | OXYGEN SATURATION: 98 % | BODY MASS INDEX: 27.31 KG/M2 | RESPIRATION RATE: 18 BRPM | HEART RATE: 70 BPM | SYSTOLIC BLOOD PRESSURE: 154 MMHG | WEIGHT: 148.4 LBS | HEIGHT: 62 IN | DIASTOLIC BLOOD PRESSURE: 78 MMHG

## 2021-12-07 DIAGNOSIS — E83.52 HYPERCALCEMIA: Primary | ICD-10-CM

## 2021-12-07 LAB
ALBUMIN SERPL-MCNC: 3.8 G/DL (ref 3.2–4.6)
ALBUMIN/GLOB SERPL: 1 {RATIO} (ref 1.2–3.5)
ALP SERPL-CCNC: 54 U/L (ref 50–130)
ALT SERPL-CCNC: 17 U/L (ref 12–65)
ANION GAP SERPL CALC-SCNC: 6 MMOL/L (ref 7–16)
AST SERPL-CCNC: 24 U/L (ref 15–37)
BASOPHILS # BLD: 0 K/UL (ref 0–0.2)
BASOPHILS NFR BLD: 1 % (ref 0–2)
BILIRUB SERPL-MCNC: 1 MG/DL (ref 0.2–1.1)
BUN SERPL-MCNC: 25 MG/DL (ref 8–23)
CALCIUM SERPL-MCNC: 13.9 MG/DL (ref 8.3–10.4)
CHLORIDE SERPL-SCNC: 105 MMOL/L (ref 98–107)
CO2 SERPL-SCNC: 30 MMOL/L (ref 21–32)
CREAT SERPL-MCNC: 1 MG/DL (ref 0.6–1)
DIFFERENTIAL METHOD BLD: ABNORMAL
EOSINOPHIL # BLD: 0.1 K/UL (ref 0–0.8)
EOSINOPHIL NFR BLD: 2 % (ref 0.5–7.8)
ERYTHROCYTE [DISTWIDTH] IN BLOOD BY AUTOMATED COUNT: 12.4 % (ref 11.9–14.6)
GLOBULIN SER CALC-MCNC: 3.9 G/DL (ref 2.3–3.5)
GLUCOSE SERPL-MCNC: 87 MG/DL (ref 65–100)
HCT VFR BLD AUTO: 34.1 % (ref 35.8–46.3)
HGB BLD-MCNC: 11 G/DL (ref 11.7–15.4)
IMM GRANULOCYTES # BLD AUTO: 0 K/UL (ref 0–0.5)
IMM GRANULOCYTES NFR BLD AUTO: 0 % (ref 0–5)
LYMPHOCYTES # BLD: 1 K/UL (ref 0.5–4.6)
LYMPHOCYTES NFR BLD: 33 % (ref 13–44)
MAGNESIUM SERPL-MCNC: 2 MG/DL (ref 1.8–2.4)
MCH RBC QN AUTO: 32 PG (ref 26.1–32.9)
MCHC RBC AUTO-ENTMCNC: 32.3 G/DL (ref 31.4–35)
MCV RBC AUTO: 99.1 FL (ref 79.6–97.8)
MONOCYTES # BLD: 0.4 K/UL (ref 0.1–1.3)
MONOCYTES NFR BLD: 14 % (ref 4–12)
NEUTS SEG # BLD: 1.6 K/UL (ref 1.7–8.2)
NEUTS SEG NFR BLD: 51 % (ref 43–78)
NRBC # BLD: 0 K/UL (ref 0–0.2)
PLATELET # BLD AUTO: 195 K/UL (ref 150–450)
PMV BLD AUTO: 11.3 FL (ref 9.4–12.3)
POTASSIUM SERPL-SCNC: 3.4 MMOL/L (ref 3.5–5.1)
PROT SERPL-MCNC: 7.7 G/DL (ref 6.3–8.2)
RBC # BLD AUTO: 3.44 M/UL (ref 4.05–5.2)
SODIUM SERPL-SCNC: 141 MMOL/L (ref 136–145)
WBC # BLD AUTO: 3.2 K/UL (ref 4.3–11.1)

## 2021-12-07 PROCEDURE — 96375 TX/PRO/DX INJ NEW DRUG ADDON: CPT

## 2021-12-07 PROCEDURE — 83735 ASSAY OF MAGNESIUM: CPT

## 2021-12-07 PROCEDURE — 85025 COMPLETE CBC W/AUTO DIFF WBC: CPT

## 2021-12-07 PROCEDURE — 74011000258 HC RX REV CODE- 258: Performed by: EMERGENCY MEDICINE

## 2021-12-07 PROCEDURE — 96365 THER/PROPH/DIAG IV INF INIT: CPT

## 2021-12-07 PROCEDURE — 93005 ELECTROCARDIOGRAM TRACING: CPT | Performed by: NURSE PRACTITIONER

## 2021-12-07 PROCEDURE — 74011250636 HC RX REV CODE- 250/636: Performed by: EMERGENCY MEDICINE

## 2021-12-07 PROCEDURE — 80053 COMPREHEN METABOLIC PANEL: CPT

## 2021-12-07 PROCEDURE — 99283 EMERGENCY DEPT VISIT LOW MDM: CPT

## 2021-12-07 PROCEDURE — 96361 HYDRATE IV INFUSION ADD-ON: CPT

## 2021-12-07 RX ORDER — FUROSEMIDE 10 MG/ML
20 INJECTION INTRAMUSCULAR; INTRAVENOUS
Status: COMPLETED | OUTPATIENT
Start: 2021-12-07 | End: 2021-12-07

## 2021-12-07 RX ADMIN — ZOLEDRONIC ACID 3 MG: 4 INJECTION, SOLUTION, CONCENTRATE INTRAVENOUS at 18:44

## 2021-12-07 RX ADMIN — SODIUM CHLORIDE 1000 ML: 900 INJECTION, SOLUTION INTRAVENOUS at 17:32

## 2021-12-07 RX ADMIN — FUROSEMIDE 20 MG: 10 INJECTION, SOLUTION INTRAMUSCULAR; INTRAVENOUS at 17:32

## 2021-12-07 NOTE — ED NOTES
79-year-old female presents to the emergency department with daughter for evaluation of elevated serum calcium level. States that they were seen by her PCP yesterday and had labs drawn. States that they were called by PCP this afternoon and told that her calcium was elevated at 14. They were advised to present to the emergency department immediately for management. Patient states \"I feel fine. \"  She denies all complaints. Daughter states there is been no known recent complaint, injury or other change. She is ambulatory with walker. Patient evaluated initially in triage. Rapid Medical Evaluation was conducted and necessary orders have been placed. I have performed a medical screening exam.  Care will now be transferred to the provider in the back of the emergency department.   Ivonne Chou NP 3:19 PM

## 2021-12-07 NOTE — ED TRIAGE NOTES
Pt daughter states pt received a call from Dr Meliza Horton that her potassium level was 14.2 from lab work yesterday.

## 2021-12-08 LAB
ATRIAL RATE: 67 BPM
CALCULATED P AXIS, ECG09: 73 DEGREES
CALCULATED R AXIS, ECG10: -29 DEGREES
CALCULATED T AXIS, ECG11: 64 DEGREES
DIAGNOSIS, 93000: NORMAL
P-R INTERVAL, ECG05: 192 MS
Q-T INTERVAL, ECG07: 372 MS
QRS DURATION, ECG06: 120 MS
QTC CALCULATION (BEZET), ECG08: 393 MS
VENTRICULAR RATE, ECG03: 67 BPM

## 2021-12-08 NOTE — ED NOTES
I have reviewed discharge instructions with the patient. The patient verbalized understanding. Patient left ED via Discharge Method: ambulatory to Home with family. Opportunity for questions and clarification provided. Patient given 0 scripts. To continue your aftercare when you leave the hospital, you may receive an automated call from our care team to check in on how you are doing. This is a free service and part of our promise to provide the best care and service to meet your aftercare needs.  If you have questions, or wish to unsubscribe from this service please call [phone]. Thank you for Choosing our [facility] Emergency Department.

## 2021-12-08 NOTE — ED PROVIDER NOTES
51-year-old asymptomatic female patient presents to the ER for concerns of hypercalcemia. She was due for routine office visit with her PCP. Preop visit blood work was drawn the other day and she was called today to inform her calcium was very high and she needed to come to the ER. Patient having no chest pain, palpitations, paresthesias, no nausea, vomiting, constipation. No muscle cramps or spasms.            Past Medical History:   Diagnosis Date    Abnormality of gait 5/20/2015    Allergic rhinitis 6/4/2013    Asymptomatic bilateral carotid artery stenosis 10/14/2014    Back problem 10/27/2015    Cerebrovascular disease 5/20/2015    Cerumen impaction     Essential hypertension, benign 6/4/2013    GI bleed     due to ulcer    Glaucoma 5/20/2015    Hx of seasonal allergies     Hyperparathyroidism ([facility] [address] 6/4/2013    Impaired fasting glucose 5/20/2015    Occlusion and stenosis of carotid artery without mention of cerebral infarction 5/20/2015    Osteoporosis, unspecified 6/4/2013    Other and unspecified hyperlipidemia 6/4/2013    Other decreased white blood cell count 6/4/2013    SNHL (sensorineural hearing loss) 6/4/2013    Stroke ([facility]     Toxic multinodular goiter 6/4/2013    Toxic multinodular goiter without mention of thyrotoxic crisis or storm 6/4/2013    Transient ischemic attack (TIA), and cerebral infarction without residual deficits(V12.54) 6/4/2013    Unspecified hearing loss 6/4/2013    Vitamin D deficiency        Past Surgical History:   Procedure Laterality Date    HX CATARACT REMOVAL Bilateral     HX COLONOSCOPY      HX HEENT      Sinus surgery         Family History:   Problem Relation Age of Onset    Diabetes Mother     Kidney Disease Maternal Grandfather        Social History     Socioeconomic History    Marital status:      Spouse name: Not on file    Number of children: Not on file    Years of education: Not on file    Highest education level: Not on file   Occupational History    Not on file   Tobacco Use    Smoking status: Never Smoker    Smokeless tobacco: Never Used   Substance and Sexual Activity    Alcohol use: No    Drug use: No    Sexual activity: Never   Other Topics Concern    Not on file   Social History Narrative    Not on file     Social Determinants of Health     Financial Resource Strain:     Difficulty of Paying Living Expenses: Not on file   Food Insecurity:     Worried About Running Out of Food in the Last Year: Not on file    Nuha of Food in the Last Year: Not on file   Transportation Needs:     Lack of Transportation (Medical): Not on file    Lack of Transportation (Non-Medical): Not on file   Physical Activity:     Days of Exercise per Week: Not on file    Minutes of Exercise per Session: Not on file   Stress:     Feeling of Stress : Not on file   Social Connections:     Frequency of Communication with Friends and Family: Not on file    Frequency of Social Gatherings with Friends and Family: Not on file    Attends Confucianist Services: Not on file    Active Member of [address] [place] [address] or Organizations: Not on file    Attends Club or Organization Meetings: Not on file    Marital Status: Not on file   Intimate Partner Violence:     Fear of Current or Ex-Partner: Not on file    Emotionally Abused: Not on file    Physically Abused: Not on file    Sexually Abused: Not on file   Housing Stability:     Unable to Pay for Housing in the Last Year: Not on file    Number of [place] in the Last Year: Not on file    Unstable Housing in the Last Year: Not on file         ALLERGIES: Seafood and [place]    Review of Systems   Constitutional: Negative for chills and fever. HENT: Negative for rhinorrhea and sore throat. Eyes: Negative for discharge and redness. Respiratory: Negative for cough and shortness of breath. Cardiovascular: Negative for chest pain, palpitations and leg swelling.    Gastrointestinal: Negative for abdominal pain, constipation, nausea and vomiting. Musculoskeletal: Negative for arthralgias, back pain and myalgias. Skin: Negative for rash. Neurological: Negative for dizziness, weakness, numbness and headaches. All other systems reviewed and are negative. Vitals:    12/07/21 1519 12/07/21 1742   BP: (!) 175/84 (!) 154/78   Pulse: 68 70   Resp: 16 18   Temp: 98.2 °F (36.8 °C) 97.9 °F (36.6 °C)   SpO2: 97% 98%   Weight: 67.3 kg (148 lb 6.4 oz)    Height: 5' 2\" (1.575 m)             Physical Exam  Vitals and nursing note reviewed. Constitutional:       General: She is not in acute distress. Appearance: Normal appearance. She is well-developed. She is not ill-appearing, toxic-appearing or diaphoretic. HENT:      Head: Normocephalic and atraumatic. Right Ear: External ear normal.      Left Ear: External ear normal.   Eyes:      General: No scleral icterus. Right eye: No discharge. Left eye: No discharge. Extraocular Movements: Extraocular movements intact. Conjunctiva/sclera: Conjunctivae normal.      Pupils: Pupils are equal, round, and reactive to light. Cardiovascular:      Rate and Rhythm: Normal rate and regular rhythm. Pulmonary:      Effort: Pulmonary effort is normal. No respiratory distress. Breath sounds: Normal breath sounds. No wheezing or rales. Abdominal:      General: Abdomen is flat. Tenderness: There is no abdominal tenderness. There is no guarding. Musculoskeletal:         General: Normal range of motion. Cervical back: Normal range of motion and neck supple. Skin:     General: Skin is warm and dry. Findings: No rash. Neurological:      General: No focal deficit present. Mental Status: She is alert and oriented to person, place, and time. Mental status is at baseline. Motor: No abnormal muscle tone.       Comments: cni 2-12 grossly  Nl gait,  Nl speech     Psychiatric:         Mood and Affect: Mood normal. Behavior: Behavior normal.          MDM  Number of Diagnoses or Management Options  Hypercalcemia: new and does not require workup  Diagnosis management comments: Medical decision making note:  Asymptomatic hypercalcemia of 14, kidney function preserved. 1 dose of zoledronic acid here, a liter of saline and 20 of Lasix, follow-up PCP. This concludes the \"medical decision making note\" part of this emergency department visit note. Amount and/or Complexity of Data Reviewed  Clinical lab tests: ordered and reviewed (Results Include:    Recent Results (from the past 24 hour(s))  -EKG, 12 LEAD, INITIAL:   Collection Time: 12/07/21  3:41 PM       Result                      Value             Ref Range           Ventricular Rate            67                BPM                 Atrial Rate                 67                BPM                 P-R Interval                192               ms                  QRS Duration                120               ms                  Q-T Interval                372               ms                  QTC Calculation (Bezet)     393               ms                  Calculated P Axis           73                degrees             Calculated R Axis           -29               degrees             Calculated T Axis           64                degrees             Diagnosis                                                     Normal sinus rhythm Septal infarct , age undetermined Abnormal ECG When compared with ECG of 08-OCT-2017 17:33, Vent.  rate has increased BY 62968 BPM   -CBC WITH AUTOMATED DIFF:   Collection Time: 12/07/21  3:54 PM       Result                      Value             Ref Range           WBC                         3.2 (L)           4.3 - 11.1 K*       RBC                         3.44 (L)          4.05 - 5.2 M*       HGB                         11.0 (L)          11.7 - 15.4 *       HCT                         34.1 (L)          35.8 - 46.3 %       MCV 99.1 (H)          79.6 - 97.8 *       MCH                         32.0              26.1 - 32.9 *       MCHC                        32.3              31.4 - 35.0 *       RDW                         12.4              11.9 - 14.6 %       PLATELET                    195               150 - 450 K/*       MPV                         11.3              9.4 - 12.3 FL       ABSOLUTE NRBC               0.00              0.0 - 0.2 K/*       DF                          AUTOMATED                             NEUTROPHILS                 51                43 - 78 %           LYMPHOCYTES                 33                13 - 44 %           MONOCYTES                   14 (H)            4.0 - 12.0 %        EOSINOPHILS                 2                 0.5 - 7.8 %         BASOPHILS                   1                 0.0 - 2.0 %         IMMATURE GRANULOCYTES       0                 0.0 - 5.0 %         ABS. NEUTROPHILS            1.6 (L)           1.7 - 8.2 K/*       ABS. LYMPHOCYTES            1.0               0.5 - 4.6 K/*       ABS. MONOCYTES              0.4               0.1 - 1.3 K/*       ABS. EOSINOPHILS            0.1               0.0 - 0.8 K/*       ABS. BASOPHILS              0.0               0.0 - 0.2 K/*       ABS. IMM.  GRANS.            0.0               0.0 - 0.5 K/*  -METABOLIC PANEL, COMPREHENSIVE:   Collection Time: 12/07/21  3:54 PM       Result                      Value             Ref Range           Sodium                      141               136 - 145 mm*       Potassium                   3.4 (L)           3.5 - 5.1 mm*       Chloride                    105               98 - 107 mmo*       CO2                         30                21 - 32 mmol*       Anion gap                   6 (L)             7 - 16 mmol/L       Glucose                     87                65 - 100 mg/*       BUN                         25 (H)            8 - 23 MG/DL        Creatinine                  1.00 0.6 - 1.0 MG*       GFR est AA                  >60               >60 ml/min/1*       GFR est non-AA              56 (L)            >60 ml/min/1*       Calcium                     13.9 (HH)         8.3 - 10.4 M*       Bilirubin, total            1.0               0.2 - 1.1 MG*       ALT (SGPT)                  17                12 - 65 U/L         AST (SGOT)                  24                15 - 37 U/L         Alk.  phosphatase            54                50 - 130 U/L        Protein, total              7.7               6.3 - 8.2 g/*       Albumin                     3.8               3.2 - 4.6 g/*       Globulin                    3.9 (H)           2.3 - 3.5 g/*       A-G Ratio                   1.0 (L)           1.2 - 3.5      -MAGNESIUM:   Collection Time: 12/07/21  3:54 PM       Result                      Value             Ref Range           Magnesium                   2.0               1.8 - 2.4 mg*  )  Decide to obtain previous medical records or to obtain history from someone other than the patient: yes  Obtain history from someone other than the patient: yes    Risk of Complications, Morbidity, and/or Mortality  Presenting problems: moderate  Diagnostic procedures: low  Management options: low    Patient Progress  Patient progress: improved         Procedures

## 2022-01-11 ENCOUNTER — HOSPITAL ENCOUNTER (OUTPATIENT)
Dept: LAB | Age: 87
Discharge: HOME | End: 2022-01-11
Payer: MEDICARE

## 2022-01-11 DIAGNOSIS — D64.9 ANEMIA, UNSPECIFIED TYPE: ICD-10-CM

## 2022-01-11 DIAGNOSIS — E83.52 HYPERCALCEMIA: ICD-10-CM

## 2022-01-11 LAB
ALBUMIN SERPL-MCNC: 3.7 G/DL (ref 3.2–4.6)
ALBUMIN/GLOB SERPL: 1.1 {RATIO} (ref 1.2–3.5)
ALP SERPL-CCNC: 56 U/L (ref 50–136)
ALT SERPL-CCNC: 13 U/L (ref 12–65)
ANION GAP SERPL CALC-SCNC: 5 MMOL/L (ref 7–16)
AST SERPL-CCNC: 14 U/L (ref 15–37)
BASOPHILS # BLD: 0 K/UL (ref 0–0.2)
BASOPHILS NFR BLD: 1 % (ref 0–2)
BILIRUB SERPL-MCNC: 0.8 MG/DL (ref 0.2–1.1)
BUN SERPL-MCNC: 20 MG/DL (ref 8–23)
CALCIUM SERPL-MCNC: 12.1 MG/DL (ref 8.3–10.4)
CALCIUM SERPL-MCNC: 12.1 MG/DL (ref 8.3–10.4)
CHLORIDE SERPL-SCNC: 112 MMOL/L (ref 98–107)
CO2 SERPL-SCNC: 26 MMOL/L (ref 21–32)
CREAT SERPL-MCNC: 0.9 MG/DL (ref 0.6–1)
DIFFERENTIAL METHOD BLD: ABNORMAL
EOSINOPHIL # BLD: 0.1 K/UL (ref 0–0.8)
EOSINOPHIL NFR BLD: 2 % (ref 0.5–7.8)
ERYTHROCYTE [DISTWIDTH] IN BLOOD BY AUTOMATED COUNT: 13.2 % (ref 11.9–14.6)
ERYTHROCYTE [SEDIMENTATION RATE] IN BLOOD: 14 MM/HR (ref 0–30)
FERRITIN SERPL-MCNC: 468 NG/ML (ref 8–388)
FOLATE SERPL-MCNC: 17.6 NG/ML (ref 3.1–17.5)
GLOBULIN SER CALC-MCNC: 3.5 G/DL (ref 2.3–3.5)
GLUCOSE SERPL-MCNC: 74 MG/DL (ref 65–100)
HCT VFR BLD AUTO: 31.3 % (ref 35.8–46.3)
HGB BLD-MCNC: 9.9 G/DL (ref 11.7–15.4)
HGB RETIC QN AUTO: 38 PG (ref 29–35)
IMM GRANULOCYTES # BLD AUTO: 0 K/UL (ref 0–0.5)
IMM GRANULOCYTES NFR BLD AUTO: 0 % (ref 0–5)
IMM RETICS NFR: 20.9 % (ref 3–15.9)
IRON SATN MFR SERPL: 22 %
IRON SERPL-MCNC: 56 UG/DL (ref 35–150)
LYMPHOCYTES # BLD: 1.1 K/UL (ref 0.5–4.6)
LYMPHOCYTES NFR BLD: 33 % (ref 13–44)
MCH RBC QN AUTO: 31.8 PG (ref 26.1–32.9)
MCHC RBC AUTO-ENTMCNC: 31.6 G/DL (ref 31.4–35)
MCV RBC AUTO: 100.6 FL (ref 79.6–97.8)
MONOCYTES # BLD: 0.3 K/UL (ref 0.1–1.3)
MONOCYTES NFR BLD: 9 % (ref 4–12)
NEUTS SEG # BLD: 1.8 K/UL (ref 1.7–8.2)
NEUTS SEG NFR BLD: 56 % (ref 43–78)
NRBC # BLD: 0 K/UL (ref 0–0.2)
PLATELET # BLD AUTO: 213 K/UL (ref 150–450)
PMV BLD AUTO: 10.3 FL (ref 9.4–12.3)
POTASSIUM SERPL-SCNC: 4 MMOL/L (ref 3.5–5.1)
PROT SERPL-MCNC: 7.2 G/DL (ref 6.3–8.2)
PTH-INTACT SERPL-MCNC: 228.5 PG/ML (ref 18.5–88)
RBC # BLD AUTO: 3.11 M/UL (ref 4.05–5.2)
RETICS # AUTO: 0.12 M/UL (ref 0.03–0.1)
RETICS/RBC NFR AUTO: 3.8 % (ref 0.3–2)
SODIUM SERPL-SCNC: 143 MMOL/L (ref 136–145)
TIBC SERPL-MCNC: 257 UG/DL (ref 250–450)
VIT B12 SERPL-MCNC: 5223 PG/ML (ref 193–986)
WBC # BLD AUTO: 3.3 K/UL (ref 4.3–11.1)

## 2022-01-11 PROCEDURE — 80053 COMPREHEN METABOLIC PANEL: CPT

## 2022-01-11 PROCEDURE — 82728 ASSAY OF FERRITIN: CPT

## 2022-01-11 PROCEDURE — 83970 ASSAY OF PARATHORMONE: CPT

## 2022-01-11 PROCEDURE — 83540 ASSAY OF IRON: CPT

## 2022-01-11 PROCEDURE — 82746 ASSAY OF FOLIC ACID SERUM: CPT

## 2022-01-11 PROCEDURE — 82607 VITAMIN B-12: CPT

## 2022-01-11 PROCEDURE — 82784 ASSAY IGA/IGD/IGG/IGM EACH: CPT

## 2022-01-11 PROCEDURE — 85652 RBC SED RATE AUTOMATED: CPT

## 2022-01-11 PROCEDURE — 36415 COLL VENOUS BLD VENIPUNCTURE: CPT

## 2022-01-11 PROCEDURE — 82397 CHEMILUMINESCENT ASSAY: CPT

## 2022-01-11 PROCEDURE — 85046 RETICYTE/HGB CONCENTRATE: CPT

## 2022-01-11 PROCEDURE — 85025 COMPLETE CBC W/AUTO DIFF WBC: CPT

## 2022-01-11 PROCEDURE — 83521 IG LIGHT CHAINS FREE EACH: CPT

## 2022-01-12 LAB
KAPPA LC FREE SER-MCNC: 23.4 MG/L (ref 3.3–19.4)
KAPPA LC FREE/LAMBDA FREE SER: 1.33 {RATIO} (ref 0.26–1.65)
LAMBDA LC FREE SERPL-MCNC: 17.6 MG/L (ref 5.7–26.3)

## 2022-01-13 LAB
ALBUMIN SERPL ELPH-MCNC: 4 G/DL (ref 2.9–4.4)
ALBUMIN/GLOB SERPL: 1.5 {RATIO} (ref 0.7–1.7)
ALPHA1 GLOB SERPL ELPH-MCNC: 0.2 G/DL (ref 0–0.4)
ALPHA2 GLOB SERPL ELPH-MCNC: 0.6 G/DL (ref 0.4–1)
B-GLOBULIN SERPL ELPH-MCNC: 0.9 G/DL (ref 0.7–1.3)
GAMMA GLOB SERPL ELPH-MCNC: 1 G/DL (ref 0.4–1.8)
GLOBULIN SER-MCNC: 2.8 G/DL (ref 2.2–3.9)
IGA SERPL-MCNC: 191 MG/DL (ref 64–422)
IGG SERPL-MCNC: 1028 MG/DL (ref 586–1602)
IGM SERPL-MCNC: 56 MG/DL (ref 26–217)
INTERPRETATION SERPL IEP-IMP: NORMAL
M PROTEIN SERPL ELPH-MCNC: NORMAL G/DL
PROT SERPL-MCNC: 6.8 G/DL (ref 6–8.5)

## 2022-01-14 LAB — PTH RELATED PROT SERPL-SCNC: <2 PMOL/L

## 2022-01-19 LAB
FLOW CYTOMETRY, FBTC1: NORMAL
SPECIMEN SOURCE: NORMAL
TEST ORDERED:: NORMAL

## 2022-01-31 ENCOUNTER — HOSPITAL ENCOUNTER (OUTPATIENT)
Dept: CT IMAGING | Age: 87
Discharge: HOME | End: 2022-01-31
Attending: INTERNAL MEDICINE
Payer: MEDICARE

## 2022-01-31 DIAGNOSIS — E83.52 HYPERCALCEMIA: ICD-10-CM

## 2022-01-31 LAB — CREAT BLD-MCNC: 0.87 MG/DL (ref 0.8–1.5)

## 2022-01-31 PROCEDURE — 74177 CT ABD & PELVIS W/CONTRAST: CPT

## 2022-01-31 PROCEDURE — 74011000258 HC RX REV CODE- 258: Performed by: INTERNAL MEDICINE

## 2022-01-31 PROCEDURE — 74011000636 HC RX REV CODE- 636: Performed by: INTERNAL MEDICINE

## 2022-01-31 PROCEDURE — 82565 ASSAY OF CREATININE: CPT

## 2022-01-31 RX ORDER — SODIUM CHLORIDE 0.9 % (FLUSH) 0.9 %
10 SYRINGE (ML) INJECTION
Status: COMPLETED | OUTPATIENT
Start: 2022-01-31 | End: 2022-01-31

## 2022-01-31 RX ADMIN — IOPAMIDOL 100 ML: 755 INJECTION, SOLUTION INTRAVENOUS at 14:10

## 2022-01-31 RX ADMIN — Medication 10 ML: at 14:10

## 2022-01-31 RX ADMIN — DIATRIZOATE MEGLUMINE AND DIATRIZOATE SODIUM 15 ML: 660; 100 LIQUID ORAL; RECTAL at 14:09

## 2022-01-31 RX ADMIN — SODIUM CHLORIDE 100 ML: 9 INJECTION, SOLUTION INTRAVENOUS at 14:09

## 2022-02-01 ENCOUNTER — HOSPITAL ENCOUNTER (OUTPATIENT)
Dept: LAB | Age: 87
Discharge: HOME | End: 2022-02-01
Payer: MEDICARE

## 2022-02-01 DIAGNOSIS — D64.9 ANEMIA, UNSPECIFIED TYPE: ICD-10-CM

## 2022-02-01 LAB
ALBUMIN SERPL-MCNC: 3.5 G/DL (ref 3.2–4.6)
ALBUMIN/GLOB SERPL: 1 {RATIO} (ref 1.2–3.5)
ALP SERPL-CCNC: 55 U/L (ref 50–136)
ALT SERPL-CCNC: 14 U/L (ref 12–65)
ANION GAP SERPL CALC-SCNC: 4 MMOL/L (ref 7–16)
AST SERPL-CCNC: 18 U/L (ref 15–37)
BASOPHILS # BLD: 0 K/UL (ref 0–0.2)
BASOPHILS NFR BLD: 0 % (ref 0–2)
BILIRUB SERPL-MCNC: 0.9 MG/DL (ref 0.2–1.1)
BUN SERPL-MCNC: 18 MG/DL (ref 8–23)
CALCIUM SERPL-MCNC: 11 MG/DL (ref 8.3–10.4)
CHLORIDE SERPL-SCNC: 111 MMOL/L (ref 98–107)
CO2 SERPL-SCNC: 26 MMOL/L (ref 21–32)
CREAT SERPL-MCNC: 0.8 MG/DL (ref 0.6–1)
DIFFERENTIAL METHOD BLD: ABNORMAL
EOSINOPHIL # BLD: 0.1 K/UL (ref 0–0.8)
EOSINOPHIL NFR BLD: 2 % (ref 0.5–7.8)
ERYTHROCYTE [DISTWIDTH] IN BLOOD BY AUTOMATED COUNT: 13.2 % (ref 11.9–14.6)
GLOBULIN SER CALC-MCNC: 3.5 G/DL (ref 2.3–3.5)
GLUCOSE SERPL-MCNC: 100 MG/DL (ref 65–100)
HCT VFR BLD AUTO: 31.3 % (ref 35.8–46.3)
HGB BLD-MCNC: 10.1 G/DL (ref 11.7–15.4)
IMM GRANULOCYTES # BLD AUTO: 0 K/UL (ref 0–0.5)
IMM GRANULOCYTES NFR BLD AUTO: 0 % (ref 0–5)
LYMPHOCYTES # BLD: 1 K/UL (ref 0.5–4.6)
LYMPHOCYTES NFR BLD: 32 % (ref 13–44)
MCH RBC QN AUTO: 32.1 PG (ref 26.1–32.9)
MCHC RBC AUTO-ENTMCNC: 32.3 G/DL (ref 31.4–35)
MCV RBC AUTO: 99.4 FL (ref 79.6–97.8)
MONOCYTES # BLD: 0.4 K/UL (ref 0.1–1.3)
MONOCYTES NFR BLD: 13 % (ref 4–12)
NEUTS SEG # BLD: 1.7 K/UL (ref 1.7–8.2)
NEUTS SEG NFR BLD: 53 % (ref 43–78)
NRBC # BLD: 0 K/UL (ref 0–0.2)
PLATELET # BLD AUTO: 216 K/UL (ref 150–450)
PMV BLD AUTO: 10.6 FL (ref 9.4–12.3)
POTASSIUM SERPL-SCNC: 3.6 MMOL/L (ref 3.5–5.1)
PROT SERPL-MCNC: 7 G/DL (ref 6.3–8.2)
RBC # BLD AUTO: 3.15 M/UL (ref 4.05–5.2)
SODIUM SERPL-SCNC: 141 MMOL/L (ref 136–145)
WBC # BLD AUTO: 3.2 K/UL (ref 4.3–11.1)

## 2022-02-01 PROCEDURE — 36415 COLL VENOUS BLD VENIPUNCTURE: CPT

## 2022-02-01 PROCEDURE — 85025 COMPLETE CBC W/AUTO DIFF WBC: CPT

## 2022-02-01 PROCEDURE — 80053 COMPREHEN METABOLIC PANEL: CPT

## 2022-03-04 ENCOUNTER — HOSPITAL ENCOUNTER (OUTPATIENT)
Dept: LAB | Age: 87
Discharge: HOME | End: 2022-03-04
Payer: MEDICARE

## 2022-03-04 DIAGNOSIS — N32.89 BLADDER MASS: ICD-10-CM

## 2022-03-04 PROCEDURE — 87086 URINE CULTURE/COLONY COUNT: CPT

## 2022-03-07 LAB
BACTERIA SPEC CULT: NORMAL
SERVICE CMNT-IMP: NORMAL

## 2022-03-14 ENCOUNTER — HOSPITAL ENCOUNTER (OUTPATIENT)
Dept: MAMMOGRAPHY | Age: 87
Discharge: HOME | End: 2022-03-14
Attending: STUDENT IN AN ORGANIZED HEALTH CARE EDUCATION/TRAINING PROGRAM
Payer: MEDICARE

## 2022-03-14 DIAGNOSIS — M81.0 AGE-RELATED OSTEOPOROSIS WITHOUT CURRENT PATHOLOGICAL FRACTURE: ICD-10-CM

## 2022-03-14 PROCEDURE — 77080 DXA BONE DENSITY AXIAL: CPT

## 2022-03-18 PROBLEM — K80.20 CALCULUS OF GALLBLADDER WITHOUT CHOLECYSTITIS WITHOUT OBSTRUCTION: Status: ACTIVE | Noted: 2017-04-30

## 2022-03-19 PROBLEM — R09.89 DECREASED PEDAL PULSES: Status: ACTIVE | Noted: 2021-02-22

## 2022-03-19 PROBLEM — I35.1 AORTIC VALVE REGURGITATION: Status: ACTIVE | Noted: 2018-06-12

## 2022-03-19 PROBLEM — I73.9 PAD (PERIPHERAL ARTERY DISEASE) (HCC): Status: ACTIVE | Noted: 2021-07-29

## 2022-03-19 PROBLEM — M23.50 CHRONIC KNEE INSTABILITY: Status: ACTIVE | Noted: 2017-04-30

## 2022-04-14 NOTE — PROGRESS NOTES
Pt advised  Per Dr Maximino Abreu  Bone density shows evidence of osteoporosis.  Continue ibandronate and other medications per your endocrinologist

## 2022-05-09 ENCOUNTER — HOSPITAL ENCOUNTER (OUTPATIENT)
Dept: LAB | Age: 87
Discharge: HOME | End: 2022-05-09
Payer: MEDICARE

## 2022-05-09 DIAGNOSIS — E83.52 SERUM CALCIUM ELEVATED: ICD-10-CM

## 2022-05-09 DIAGNOSIS — D64.9 ANEMIA, UNSPECIFIED TYPE: ICD-10-CM

## 2022-05-09 LAB
ALBUMIN SERPL-MCNC: 3.3 G/DL (ref 3.2–4.6)
ALBUMIN/GLOB SERPL: 0.9 {RATIO} (ref 1.2–3.5)
ALP SERPL-CCNC: 84 U/L (ref 50–136)
ALT SERPL-CCNC: 24 U/L (ref 12–65)
ANION GAP SERPL CALC-SCNC: 4 MMOL/L (ref 7–16)
AST SERPL-CCNC: 18 U/L (ref 15–37)
BASOPHILS # BLD: 0 K/UL (ref 0–0.2)
BASOPHILS NFR BLD: 0 % (ref 0–2)
BILIRUB SERPL-MCNC: 0.5 MG/DL (ref 0.2–1.1)
BUN SERPL-MCNC: 23 MG/DL (ref 8–23)
CALCIUM SERPL-MCNC: 10 MG/DL (ref 8.3–10.4)
CHLORIDE SERPL-SCNC: 110 MMOL/L (ref 98–107)
CO2 SERPL-SCNC: 27 MMOL/L (ref 21–32)
CREAT SERPL-MCNC: 0.9 MG/DL (ref 0.6–1)
DIFFERENTIAL METHOD BLD: ABNORMAL
EOSINOPHIL # BLD: 0.1 K/UL (ref 0–0.8)
EOSINOPHIL NFR BLD: 4 % (ref 0.5–7.8)
ERYTHROCYTE [DISTWIDTH] IN BLOOD BY AUTOMATED COUNT: 13 % (ref 11.9–14.6)
GLOBULIN SER CALC-MCNC: 3.6 G/DL (ref 2.3–3.5)
GLUCOSE SERPL-MCNC: 88 MG/DL (ref 65–100)
HCT VFR BLD AUTO: 33.6 % (ref 35.8–46.3)
HGB BLD-MCNC: 10.5 G/DL (ref 11.7–15.4)
IMM GRANULOCYTES # BLD AUTO: 0 K/UL (ref 0–0.5)
IMM GRANULOCYTES NFR BLD AUTO: 0 % (ref 0–5)
LYMPHOCYTES # BLD: 1.1 K/UL (ref 0.5–4.6)
LYMPHOCYTES NFR BLD: 35 % (ref 13–44)
MCH RBC QN AUTO: 31.6 PG (ref 26.1–32.9)
MCHC RBC AUTO-ENTMCNC: 31.3 G/DL (ref 31.4–35)
MCV RBC AUTO: 101.2 FL (ref 79.6–97.8)
MONOCYTES # BLD: 0.4 K/UL (ref 0.1–1.3)
MONOCYTES NFR BLD: 12 % (ref 4–12)
NEUTS SEG # BLD: 1.5 K/UL (ref 1.7–8.2)
NEUTS SEG NFR BLD: 49 % (ref 43–78)
NRBC # BLD: 0 K/UL (ref 0–0.2)
PLATELET # BLD AUTO: 175 K/UL (ref 150–450)
PMV BLD AUTO: 9.9 FL (ref 9.4–12.3)
POTASSIUM SERPL-SCNC: 3.8 MMOL/L (ref 3.5–5.1)
PROT SERPL-MCNC: 6.9 G/DL (ref 6.3–8.2)
RBC # BLD AUTO: 3.32 M/UL (ref 4.05–5.2)
SODIUM SERPL-SCNC: 141 MMOL/L (ref 136–145)
WBC # BLD AUTO: 3.1 K/UL (ref 4.3–11.1)

## 2022-05-09 PROCEDURE — 80053 COMPREHEN METABOLIC PANEL: CPT

## 2022-05-09 PROCEDURE — 36415 COLL VENOUS BLD VENIPUNCTURE: CPT

## 2022-05-09 PROCEDURE — 85025 COMPLETE CBC W/AUTO DIFF WBC: CPT

## 2022-05-18 DIAGNOSIS — N32.89 BLADDER MASS: Primary | ICD-10-CM

## 2022-07-06 ENCOUNTER — TELEPHONE (OUTPATIENT)
Dept: PHARMACY | Facility: CLINIC | Age: 87
End: 2022-07-06

## 2022-07-06 NOTE — LETTER
[address]  [address], [address]        [place] [address] [address]           07/11/22     Dear Augusto Alejandro,     We tried to reach you recently regarding some of your medications, but were unable to reach you on the telephone. We have on file that you are currently taking pravastatin 10 mg tablet - 1 tablet everyday; irbesartan 300 mg tablet - 1 tablet everyday. If you are no longer taking or taking differently, please reply to this message, or call us at the number below so that we can discuss this and update your medication profile.     It appears that these medications have not been filled at proper times. We are worried you might be missing doses or not taking it as directed.  It is important that you take your medications regularly and try not to miss a single dose.     Some ways to help you remember to take and refill your medications are to:  - Use a pill box, set an alarm, and/or keep your medication near something that you do every day  - Ask your pharmacy if they participate in [facility]", a program where you can  all of your medications on the same day  - Ask your pharmacy if you can be set up with automatic refill, where they will automatically refill your prescription when it is due and let you know it's ready to      Sincerely,   Ashley Willoughby, PharmD, [facility]  Department, toll free: [phone] option 2

## 2022-07-11 NOTE — TELEPHONE ENCOUNTER
Per Practice Assist 7/11/22 last pravastatin claim 7/5/22 x 90 ds at [facility] ([facility]  62%). Pt has not yet read [employer] message - will send as letter.     For Pharmacy Admin Tracking Only     Time Spent (min): 5
Provider Saurav Morales   9/12/2022  3:00 PM DO LARISA Alfred [facility] AMB   11/9/2022  2:30 PM [address] [facility]   11/9/2022  3:00 PM Harshad Bradford MD [facility] AMB       Sisi Peraza, PharmD, [facility]  Department, toll free: [phone] option 2

## 2022-09-11 NOTE — PROGRESS NOTES
Jimy Sorenson (:  1934) is a 80 y.o. female,Established patient, here for evaluation of the following chief complaint(s):  Follow-up (4 mth fu) and Hypertension         ASSESSMENT/PLAN:  1. Hyperparathyroidism ([address]  Labs from 2022 with elevated PTH at 228.5, elevated serum calcium at 12.1, low PTH related peptide level, normal SPEP without M spike noted, elevated free kappa light chain at 23.4  Flow cytometry from 2022 without diagnostic immunophenotypic abnormalities detected  CT chest/abdomen/pelvis with contrast on 2022 with markedly enlarged and lobulated thyroid gland, multiple low attenuating liver lesions, anterior nodular bladder wall thickening with enhancing bladder nodule extending from anterior bladder wall measuring 3 x 2.8 cm concerning for malignancy  Previously on alendronate  Continue Cinacalcet per endocrinology    - PTH, Intact; Future  - Phosphorus; Future  - Vitamin D 25 Hydroxy; Future    2. Essential hypertension  Chlorthalidone previously stopped given hypercalcemia  Continue irbesartan    - CBC with Auto Differential; Future  - Comprehensive Metabolic Panel; Future  - irbesartan (AVAPRO) 300 MG tablet; Take 1 tablet by mouth daily  Dispense: 90 tablet; Refill: 2    3. Dyslipidemia  Intolerance to Zetia  Continue pravastatin    - Comprehensive Metabolic Panel; Future  - Lipid Panel; Future    4. Multinodular goiter  5. Hyperthyroidism  Thyroid ultrasound on 2014 with multinodular goiter with largest solid nodule in left thyroid lobe measuring 5.4 cm in diameter  Markedly enlarged and lobulated thyroid gland noted on CT scan of chest/abdomen/pelvis on 2022  Continue methimazole per endocrinology    - T4, Free; Future  - TSH; Future    6. Bilateral carotid artery stenosis  Carotid ultrasound on 2/15/2022 with less than 50% bilateral ICA stenosis  Continue Plavix and pravastatin  Follow-up with vascular surgery as scheduled    7.  Bladder mass  CT scan as noted above  Cystoscopy on 3/4/2022 with impressive amount of low-grade appearing papillary tumors involving at least one half of the bladder mucosa and the majority located on the dome, anterior bladder and right sidewall and floor  Recommendations from urology/oncology for TURBT. Similar recommendations from second opinion at [facility]  Patient elects not to have surgery and to continue monitoring at this time    8. Age-related osteoporosis without current pathological fracture  DEXA scan on 3/14/2022 with 10-year overall fracture risk 5.1% and 10-year hip fracture risk 1.4%  Status post 5+ year course of ibandronate    9. Allergy, sequela  Continue Singulair, Allegra    10. Macrocytic anemia  Per chart review baseline hemoglobin 10-11  B12 level elevated at 5223 on 1/11/2022  Reticulocyte count elevated on 1/11/2022  Folate high normal on 1/11/2022  Recheck CBC, B12 and folate levels and supplement accordingly    - CBC with Auto Differential; Future  - Vitamin B12; Future  - Folate; Future    11. Irregular heart beat  EKG in office today showed sinus rhythm with PAC, J point elevation in V1-3 with TWI in V4-6   Echo on 6/5/18 with a EF of 50 to 55%, indeterminate diastolic function, mild LVH, moderately dilated LA, mild AV sclerosis with mild to moderate AR, mild MR, mild TR  Monitor for increasing palpitations, dizziness, etc. at which point she is to alert provider  Further chart review shows similar TWI from EKG in 2018     - EKG 12 Lead; Future  - EKG 12 Lead    No follow-ups on file. Subjective   SUBJECTIVE/OBJECTIVE:  Many years ago. Patient is right-hand dominant at baseline, denies weakness. Patient is an 49-year-old -American female who presents to the office today, new by her daughter for follow-up.   Patient still endorses chronic right-sided tingling secondary to her TIA did have some neck pain a few weeks ago, diagnosed with a muscle spasm in urgent care and prescribed prednisone and Flexeril. However prednisone caused severely high blood pressure of 210/90. Previously had tried hot and cold compresses. Patient still has some chronic constipation for which she takes Dulcolax and tries to maintain hydration. No abdominal pain, nausea, vomiting, chest pain, shortness of breath, fevers, chills, dysuria, hematuria, vaginal bleeding or discharge, anorexia, dysphagia, nosebleeds, gingival bleeding, melena or hematochezia. Still follows with endocrinology in [facility] for hyperparathyroidism and hyperthyroidism. Review of Systems   Constitutional:  Negative for appetite change, chills and fever. HENT:  Negative for nosebleeds and trouble swallowing. Respiratory:  Negative for shortness of breath. Cardiovascular:  Negative for chest pain. Gastrointestinal:  Positive for constipation. Negative for abdominal pain, anal bleeding, blood in stool, nausea and vomiting. Genitourinary:  Negative for dysuria, hematuria, vaginal bleeding and vaginal discharge. Musculoskeletal:  Positive for neck pain. Neurological:         Positive for right-sided paresthesias        Objective   Physical Exam  Vitals reviewed. Constitutional:       General: She is not in acute distress. Appearance: Normal appearance. She is not ill-appearing, toxic-appearing or diaphoretic. HENT:      Head: Normocephalic and atraumatic. Neck:      Vascular: No carotid bruit. Cardiovascular:      Rate and Rhythm: Normal rate. Rhythm irregular. Heart sounds: No murmur heard. No friction rub. No gallop. Pulmonary:      Effort: Pulmonary effort is normal. No respiratory distress. Breath sounds: No wheezing, rhonchi or rales. Abdominal:      General: Bowel sounds are normal.      Palpations: Abdomen is soft. Tenderness: There is no abdominal tenderness. There is no guarding. Musculoskeletal:         General: No swelling. Right lower leg: No edema. Left lower leg: No edema.    Skin: General: Skin is warm and dry. Coloration: Skin is not jaundiced. Neurological:      Mental Status: She is alert. Cranial Nerves: No dysarthria or facial asymmetry. Sensory: Sensory deficit (Sensation to light touch along right upper extremity) present. Motor: No weakness (Muscle strength 5/5 in all 4 extremities), tremor or pronator drift. Coordination: Finger-Nose-Finger Test normal.      Comments: No facial asymmetry. Symmetric wrinkling of forehead. No tongue deviation. Psychiatric:         Attention and Perception: Attention normal.         Mood and Affect: Mood and affect normal. Mood is not anxious or depressed. Affect is not tearful. Speech: Speech normal. Speech is not rapid and pressured or slurred. Behavior: Behavior normal. Behavior is not agitated, aggressive or combative. Behavior is cooperative. Thought Content: Thought content normal.          On this date 9/12/2022 I have spent 40 minutes reviewing previous notes, test results and face to face with the patient discussing the diagnosis and importance of compliance with the treatment plan as well as documenting on the day of the visit. An electronic signature was used to authenticate this note.     --Alice Loredo DO

## 2022-09-12 ENCOUNTER — OFFICE VISIT (OUTPATIENT)
Dept: INTERNAL MEDICINE CLINIC | Facility: CLINIC | Age: 87
End: 2022-09-12
Payer: MEDICARE

## 2022-09-12 VITALS
WEIGHT: 163.2 LBS | HEIGHT: 63 IN | OXYGEN SATURATION: 97 % | TEMPERATURE: 98.8 F | DIASTOLIC BLOOD PRESSURE: 86 MMHG | HEART RATE: 71 BPM | SYSTOLIC BLOOD PRESSURE: 146 MMHG | BODY MASS INDEX: 28.92 KG/M2

## 2022-09-12 DIAGNOSIS — E04.2 MULTINODULAR GOITER: ICD-10-CM

## 2022-09-12 DIAGNOSIS — I65.23 BILATERAL CAROTID ARTERY STENOSIS: ICD-10-CM

## 2022-09-12 DIAGNOSIS — D53.9 MACROCYTIC ANEMIA: ICD-10-CM

## 2022-09-12 DIAGNOSIS — T78.40XS ALLERGY, SEQUELA: ICD-10-CM

## 2022-09-12 DIAGNOSIS — E78.5 DYSLIPIDEMIA: ICD-10-CM

## 2022-09-12 DIAGNOSIS — I10 ESSENTIAL HYPERTENSION: ICD-10-CM

## 2022-09-12 DIAGNOSIS — M81.0 AGE-RELATED OSTEOPOROSIS WITHOUT CURRENT PATHOLOGICAL FRACTURE: ICD-10-CM

## 2022-09-12 DIAGNOSIS — E21.3 HYPERPARATHYROIDISM (HCC): Primary | ICD-10-CM

## 2022-09-12 DIAGNOSIS — E05.90 HYPERTHYROIDISM: ICD-10-CM

## 2022-09-12 DIAGNOSIS — I49.9 IRREGULAR HEART BEAT: ICD-10-CM

## 2022-09-12 DIAGNOSIS — N32.89 BLADDER MASS: ICD-10-CM

## 2022-09-12 PROCEDURE — G8427 DOCREV CUR MEDS BY ELIG CLIN: HCPCS | Performed by: STUDENT IN AN ORGANIZED HEALTH CARE EDUCATION/TRAINING PROGRAM

## 2022-09-12 PROCEDURE — G8417 CALC BMI ABV UP PARAM F/U: HCPCS | Performed by: STUDENT IN AN ORGANIZED HEALTH CARE EDUCATION/TRAINING PROGRAM

## 2022-09-12 PROCEDURE — 1036F TOBACCO NON-USER: CPT | Performed by: STUDENT IN AN ORGANIZED HEALTH CARE EDUCATION/TRAINING PROGRAM

## 2022-09-12 PROCEDURE — 1090F PRES/ABSN URINE INCON ASSESS: CPT | Performed by: STUDENT IN AN ORGANIZED HEALTH CARE EDUCATION/TRAINING PROGRAM

## 2022-09-12 PROCEDURE — 93000 ELECTROCARDIOGRAM COMPLETE: CPT | Performed by: STUDENT IN AN ORGANIZED HEALTH CARE EDUCATION/TRAINING PROGRAM

## 2022-09-12 PROCEDURE — 99215 OFFICE O/P EST HI 40 MIN: CPT | Performed by: STUDENT IN AN ORGANIZED HEALTH CARE EDUCATION/TRAINING PROGRAM

## 2022-09-12 PROCEDURE — 1123F ACP DISCUSS/DSCN MKR DOCD: CPT | Performed by: STUDENT IN AN ORGANIZED HEALTH CARE EDUCATION/TRAINING PROGRAM

## 2022-09-12 RX ORDER — IRBESARTAN 300 MG/1
300 TABLET ORAL DAILY
Qty: 90 TABLET | Refills: 2 | Status: SHIPPED | OUTPATIENT
Start: 2022-09-12

## 2022-09-12 ASSESSMENT — ENCOUNTER SYMPTOMS
TROUBLE SWALLOWING: 0
CONSTIPATION: 1
VOMITING: 0
BLOOD IN STOOL: 0
SHORTNESS OF BREATH: 0
NAUSEA: 0
ABDOMINAL PAIN: 0
ANAL BLEEDING: 0

## 2022-09-12 ASSESSMENT — PATIENT HEALTH QUESTIONNAIRE - PHQ9
SUM OF ALL RESPONSES TO PHQ QUESTIONS 1-9: 0
2. FEELING DOWN, DEPRESSED OR HOPELESS: 0
SUM OF ALL RESPONSES TO PHQ QUESTIONS 1-9: 0
SUM OF ALL RESPONSES TO PHQ QUESTIONS 1-9: 0
SUM OF ALL RESPONSES TO PHQ9 QUESTIONS 1 & 2: 0
SUM OF ALL RESPONSES TO PHQ QUESTIONS 1-9: 0
1. LITTLE INTEREST OR PLEASURE IN DOING THINGS: 0

## 2022-09-15 DIAGNOSIS — I35.1 AORTIC VALVE REGURGITATION: ICD-10-CM

## 2022-09-15 DIAGNOSIS — I73.9 PAD (PERIPHERAL ARTERY DISEASE) (HCC): ICD-10-CM

## 2022-09-15 DIAGNOSIS — I67.9 CEREBROVASCULAR DISEASE: Primary | ICD-10-CM

## 2022-09-15 DIAGNOSIS — I65.23 ASYMPTOMATIC BILATERAL CAROTID ARTERY STENOSIS: ICD-10-CM

## 2022-09-15 DIAGNOSIS — I10 ESSENTIAL HYPERTENSION, BENIGN: ICD-10-CM

## 2022-10-06 ENCOUNTER — TELEPHONE (OUTPATIENT)
Dept: PHARMACY | Facility: CLINIC | Age: 87
End: 2022-10-06

## 2022-10-06 NOTE — TELEPHONE ENCOUNTER
[facility] CLINICAL PHARMACY: ADHERENCE REVIEW  Identified care gap per [employer]: fills at [facility]: Statin adherence    Last Visit: 9/12/2022      ASSESSMENT  ACE/ARB ADHERENCE    Insurance Records claims through 10/6/2022 (Prior Year [place] =  NA%; YTD [place] =  96%; Potential Fail Date: 11/30/2022 ):   Irbesartan last filled on 7/16/2022 for 80 day supply. Next refill due: 10/14/2022  A new rx was sent over 9/12/2022    BP Readings from Last 3 Encounters:   09/12/22 (!) 146/86   05/09/22 139/73   05/02/22 (!) 140/86     Estimated Creatinine Clearance: 42 mL/min (based on SCr of 0.9 mg/dL). [id number] Records claims through 10/6/2022 (Prior Year [place] =  NA%; YTD [place] =  75%; Potential Fail Date: 10/11/2022 ):   Pavastatin last filled on 7/5/2022 for 90 day supply. Next refill due: 10/3/2022-PAST DUE 3 days  7 days ADFD  0 refills remaining. Billed through [email or web address]     No results found for: CHOL, TRIG, HDL, LDLCHOLESTEROL, LDLCALC, LDLDIRECT  ALT   Date Value Ref Range Status   05/09/2022 24 12 - 65 U/L Final     AST   Date Value Ref Range Status   05/09/2022 18 15 - 37 U/L Final     The ASCVD Risk score ([place] DK, et al., 2019) failed to calculate for the following reasons:     The 2019 ASCVD risk score is only valid for ages 36 to 78     PLAN  The following are interventions that have been identified:   - Patient overdue refilling Pravastatin and active on home medication list.   - Patient needs refills for Pravastatin  - Will route for refill to pend to [place] pharmacist    Future Appointments   Date Time Provider Saurav Morales   10/11/2022 11:30 AM [facility] ECHO 49 DG GVL AMB   10/19/2022  1:30 PM Jennifer Roblero MD DG GV AMB   11/9/2022  2:30 PM Aristides Ryan [facility]   11/9/2022  3:00 PM Mark Anthony Dela Cruz MD [facility] AMB   12/12/2022  3:00 PM DO LARISA Schneider GVL AMB       Jane Polanco  PharmD, [address] [place] [address] Pharmacy  Department, toll free: [phone], option 1     For [facility] in place:  No  Recommendation Provided To: Provider: 1 via Note to Provider  Intervention Detail: Adherence Monitorin  Gap Closed?: No   Intervention Accepted By: Provider: 1  Time Spent (min): 15

## 2022-10-07 RX ORDER — PRAVASTATIN SODIUM 10 MG
10 TABLET ORAL DAILY
Qty: 90 TABLET | Refills: 1 | Status: SHIPPED | OUTPATIENT
Start: 2022-10-07

## 2022-10-07 NOTE — TELEPHONE ENCOUNTER
Jaz Mas,     Ms. Abad Amaya has been flagged for adherence by Daniel. I have pended refills for your approval.     Patient is out of refills for pravastatin.      LOV 9/12/22    Thank you,  Eduarda Gill, PharmD, [address] Pharmacist  Department: [address] Only    Time Spent (min): 5

## 2022-10-13 ENCOUNTER — TELEPHONE (OUTPATIENT)
Dept: CARDIOLOGY CLINIC | Age: 87
End: 2022-10-13

## 2022-10-13 NOTE — TELEPHONE ENCOUNTER
Called pt. Number in chart disconnected. Called daughter,Allie who is listed on pt's chart as being able to receive pt's medical info.   Informed pt's Echo  was normal.  Julianne Sebastian voiced understanding and will relay message to pt./wc

## 2022-10-13 NOTE — TELEPHONE ENCOUNTER
----- Message from Mendy Casillas MD sent at 10/12/2022  2:43 PM EDT -----  Please call patient with normal result.

## 2022-10-19 ENCOUNTER — OFFICE VISIT (OUTPATIENT)
Dept: CARDIOLOGY CLINIC | Age: 87
End: 2022-10-19
Payer: MEDICARE

## 2022-10-19 VITALS
HEIGHT: 63 IN | SYSTOLIC BLOOD PRESSURE: 164 MMHG | DIASTOLIC BLOOD PRESSURE: 80 MMHG | BODY MASS INDEX: 29.59 KG/M2 | HEART RATE: 70 BPM | WEIGHT: 167 LBS

## 2022-10-19 DIAGNOSIS — I10 ESSENTIAL HYPERTENSION, BENIGN: Primary | ICD-10-CM

## 2022-10-19 DIAGNOSIS — I35.1 NONRHEUMATIC AORTIC VALVE INSUFFICIENCY: ICD-10-CM

## 2022-10-19 PROCEDURE — 1123F ACP DISCUSS/DSCN MKR DOCD: CPT | Performed by: INTERNAL MEDICINE

## 2022-10-19 PROCEDURE — 99213 OFFICE O/P EST LOW 20 MIN: CPT | Performed by: INTERNAL MEDICINE

## 2022-10-19 PROCEDURE — G8484 FLU IMMUNIZE NO ADMIN: HCPCS | Performed by: INTERNAL MEDICINE

## 2022-10-19 PROCEDURE — G8427 DOCREV CUR MEDS BY ELIG CLIN: HCPCS | Performed by: INTERNAL MEDICINE

## 2022-10-19 PROCEDURE — G8417 CALC BMI ABV UP PARAM F/U: HCPCS | Performed by: INTERNAL MEDICINE

## 2022-10-19 PROCEDURE — 1036F TOBACCO NON-USER: CPT | Performed by: INTERNAL MEDICINE

## 2022-10-19 PROCEDURE — 1090F PRES/ABSN URINE INCON ASSESS: CPT | Performed by: INTERNAL MEDICINE

## 2022-10-19 RX ORDER — DORZOLAMIDE HYDROCHLORIDE AND TIMOLOL MALEATE 20; 5 MG/ML; MG/ML
SOLUTION/ DROPS OPHTHALMIC
COMMUNITY
Start: 2022-07-16

## 2022-10-19 RX ORDER — LATANOPROST 50 UG/ML
SOLUTION/ DROPS OPHTHALMIC
COMMUNITY
Start: 2022-02-04

## 2022-10-19 ASSESSMENT — ENCOUNTER SYMPTOMS
SHORTNESS OF BREATH: 0
EYES NEGATIVE: 1
ABDOMINAL PAIN: 0
CHEST TIGHTNESS: 0
ALLERGIC/IMMUNOLOGIC NEGATIVE: 1
GASTROINTESTINAL NEGATIVE: 1
PHOTOPHOBIA: 0
EYE PAIN: 0
BACK PAIN: 0
RESPIRATORY NEGATIVE: 1

## 2022-10-19 NOTE — PROGRESS NOTES
[facility] CARDIOLOGY  [address], [address]  PHONE: [phone]      10/19/22    NAME:  Rica Gill  : 1934  MRN: [id number]         SUBJECTIVE:   Rica Gill is a 80 y.o. female seen for follow up of:      Chief Complaint   Patient presents with    Hypertension        Cardiac Hx:  1) Abnormal ECG noted LVH with QRS widening  2) Echo  Echo shows mild to mod AR  10/11/22 LVEF 50-55% with moderate MR, mild AR    HPI:  PCP recently checked an echocardiogram that shows mild to moderate mitral valve regurgitation. It is eccentric and difficult to assess. She is currently asymptomatic. She is limited in her mobility due to spinal issues. Her blood pressure today is elevated but normally at home is controlled. Past Medical History, Past Surgical History, Family history, Social History, and Medications were all reviewed with the patient today and updated as necessary.        Current Outpatient Medications:     dorzolamide-timolol (COSOPT) 22.3-6.8 MG/ML ophthalmic solution, , Disp: , Rfl:     latanoprost (XALATAN) 0.005 % ophthalmic solution, , Disp: , Rfl:     pravastatin (PRAVACHOL) 10 MG tablet, Take 1 tablet by mouth daily, Disp: 90 tablet, Rfl: 1    irbesartan (AVAPRO) 300 MG tablet, Take 1 tablet by mouth daily, Disp: 90 tablet, Rfl: 2    COD LIVER OIL PO, Take by mouth, Disp: , Rfl:     ECHINACEA PO, Take by mouth, Disp: , Rfl:     vitamin D 25 MCG (1000 UT) CAPS, Take by mouth, Disp: , Rfl:     cinacalcet (SENSIPAR) 30 MG tablet, TAKE 1 TABLET BY MOUTH EVERY DAY, Disp: , Rfl:     clopidogrel (PLAVIX) 75 MG tablet, Take 75 mg by mouth daily, Disp: , Rfl:     fexofenadine (ALLEGRA) 180 MG tablet, Take by mouth daily as needed, Disp: , Rfl:     methIMAzole (TAPAZOLE) 5 MG tablet, Take one tab 3 times per week, Disp: , Rfl:     montelukast (SINGULAIR) 10 MG tablet, Take 10 mg by mouth daily, Disp: , Rfl:     chlorthalidone (HYGROTON) 25 MG tablet, TAKE 1/2 TABLET ONCE DAILY (Patient not taking: Reported on 10/19/2022), Disp: , Rfl:     polyethylene glycol (GLYCOLAX) 17 GM/SCOOP powder, DISSOLVE 17 GRAMS INTO LIQUID THEN DRINK BY MOUHT ONCE A DAY as needed (Patient not taking: Reported on 10/19/2022), Disp: , Rfl:     Travoprost, BAK Free, (TRAVATAN Z) 0.004 % SOLN ophthalmic solution, Apply 1 drop to eye every evening (Patient not taking: Reported on 10/19/2022), Disp: , Rfl:   Allergies   Allergen Reactions    Ezetimibe Other (See Comments)     Past Medical History:   Diagnosis Date    Abnormality of gait 5/20/2015    Allergic rhinitis 6/4/2013    Asymptomatic bilateral carotid artery stenosis 10/14/2014    Cerebrovascular disease 5/20/2015    Essential hypertension, benign 6/4/2013    GI bleed     due to ulcer    Glaucoma 5/20/2015    Hx of seasonal allergies     Hyperparathyroidism ([facility] [address] 6/4/2013    Occlusion and stenosis of carotid artery without mention of cerebral infarction 5/20/2015    Osteoporosis, unspecified 6/4/2013    Other and unspecified hyperlipidemia 6/4/2013    Other decreased white blood cell count 6/4/2013    PUD (peptic ulcer disease)     SNHL (sensorineural hearing loss) 6/4/2013    Stroke ([facility] [address] 2006    TIA     Toxic multinodular goiter 6/4/2013    Toxic multinodular goiter without mention of thyrotoxic crisis or storm 6/4/2013    hyperthyroidism     Transient ischemic attack (TIA), and cerebral infarction without residual deficits(V12.54) 6/4/2013    Vitamin D deficiency      Past Surgical History:   Procedure Laterality Date    CATARACT REMOVAL Bilateral     COLONOSCOPY      WISDOM TOOTH EXTRACTION       Family History   Problem Relation Age of Onset    Glaucoma Son     Hypertension Sister     No Known Problems Sister     Thyroid Disease Sister     Hypertension Son     Thyroid Disease Mother         goiter    Diabetes Mother     Drug Abuse Son     Hypertension Daughter     Other Mother         brain aneurysm      Social History     Tobacco Use Smoking status: Never    Smokeless tobacco: Never   Substance Use Topics    Alcohol use: Yes       ROS:  Review of Systems   Constitutional: Negative. Negative for fever. HENT:  Negative for hearing loss, nosebleeds and tinnitus. Eyes: Negative. Negative for photophobia and pain. Respiratory: Negative. Negative for chest tightness and shortness of breath. Cardiovascular: Negative. Negative for chest pain, palpitations and leg swelling. Gastrointestinal: Negative. Negative for abdominal pain. Endocrine: Negative. Negative for cold intolerance and heat intolerance. Genitourinary: Negative. Negative for dysuria. Musculoskeletal: Negative. Negative for back pain and joint swelling. Skin: Negative. Negative for rash. Allergic/Immunologic: Negative. Negative for immunocompromised state. Neurological: Negative. Negative for dizziness, syncope and light-headedness. Hematological: Negative. Does not bruise/bleed easily. Psychiatric/Behavioral: Negative. Negative for suicidal ideas. PHYSICAL EXAM:  Physical Exam  Constitutional:       General: She is not in acute distress. Appearance: She is not ill-appearing. HENT:      Head: Normocephalic and atraumatic. Nose: No congestion. Mouth/Throat:      Mouth: Mucous membranes are moist.   Eyes:      Extraocular Movements: Extraocular movements intact. Pupils: Pupils are equal, round, and reactive to light. Cardiovascular:      Rate and Rhythm: Normal rate and regular rhythm. Heart sounds: No murmur heard. No friction rub. No gallop. Pulmonary:      Effort: No respiratory distress. Breath sounds: No wheezing or rhonchi. Musculoskeletal:         General: No swelling. Cervical back: Normal range of motion. Right lower leg: No edema. Left lower leg: No edema. Skin:     General: Skin is warm and dry. Findings: No rash. Neurological:      General: No focal deficit present. Mental Status: She is oriented to person, place, and time. Psychiatric:         Mood and Affect: Mood normal.         Behavior: Behavior normal.         Judgment: Judgment normal.        BP (!) 164/80   Pulse 70   Ht 5' 3\" (1.6 m)   Wt 167 lb (75.8 kg)   BMI 29.58 kg/m²      Wt Readings from Last 10 Encounters:   10/19/22 167 lb (75.8 kg)   10/11/22 163 lb (73.9 kg)   09/12/22 163 lb 3.2 oz (74 kg)   05/09/22 160 lb (72.6 kg)   05/02/22 154 lb 6.4 oz (70 kg)   03/04/22 151 lb 9.6 oz (68.8 kg)   02/25/22 150 lb (68 kg)   02/15/22 153 lb (69.4 kg)   02/09/22 153 lb 3.2 oz (69.5 kg)   02/01/22 151 lb (68.5 kg)           Medical problems and test results were reviewed with the patient today. Lab Results   Component Value Date/Time    BUN 23 05/09/2022 12:56 PM     No results found for: ZORAIDA, CREAPOC, CREA  Lab Results   Component Value Date/Time    K 3.8 05/09/2022 12:56 PM       No results found for: CHOL, CHOLPOCT, CHOLX, CHLST, CHOLV, HDL, HDLPOC, HDLC, LDL, LDLC, VLDLC, VLDL, TGLX, TRIGL    ASSESSMENT and PLAN  No diagnosis found. IMPRESSION:  A/P  1) MR is only moderate, will redo echo in one year  2) HTN -controlled at home I asked her to keep a log for the next time she visits. ALL ORDERS THIS ENCOUNTER  Orders Placed This Encounter    dorzolamide-timolol (COSOPT) 22.3-6.8 MG/ML ophthalmic solution    latanoprost (XALATAN) 0.005 % ophthalmic solution        Follow up in 6 months. Thank you for allowing me to participate in this patient's care. Please call or contact me if there are any questions or concerns regarding the above.       Jackie Gardner MD  10/19/22  2:40 PM

## 2022-11-08 DIAGNOSIS — D64.9 ANEMIA, UNSPECIFIED TYPE: Primary | ICD-10-CM

## 2022-11-09 ENCOUNTER — HOSPITAL ENCOUNTER (OUTPATIENT)
Dept: LAB | Age: 87
Discharge: HOME | End: 2022-11-12
Payer: MEDICARE

## 2022-11-09 ENCOUNTER — OFFICE VISIT (OUTPATIENT)
Dept: ONCOLOGY | Age: 87
End: 2022-11-09
Payer: MEDICARE

## 2022-11-09 VITALS
HEART RATE: 72 BPM | SYSTOLIC BLOOD PRESSURE: 169 MMHG | DIASTOLIC BLOOD PRESSURE: 88 MMHG | BODY MASS INDEX: 29.27 KG/M2 | OXYGEN SATURATION: 96 % | HEIGHT: 63 IN | RESPIRATION RATE: 16 BRPM | WEIGHT: 165.2 LBS | TEMPERATURE: 98.7 F

## 2022-11-09 DIAGNOSIS — D64.9 ANEMIA, UNSPECIFIED TYPE: Primary | ICD-10-CM

## 2022-11-09 DIAGNOSIS — D72.819 LEUKOPENIA, UNSPECIFIED TYPE: ICD-10-CM

## 2022-11-09 DIAGNOSIS — D64.9 ANEMIA, UNSPECIFIED TYPE: ICD-10-CM

## 2022-11-09 DIAGNOSIS — E83.52 HYPERCALCEMIA: ICD-10-CM

## 2022-11-09 LAB
ALBUMIN SERPL-MCNC: 3.9 G/DL (ref 3.2–4.6)
ALBUMIN/GLOB SERPL: 1.1 {RATIO} (ref 0.4–1.6)
ALP SERPL-CCNC: 79 U/L (ref 50–136)
ALT SERPL-CCNC: 17 U/L (ref 12–65)
ANION GAP SERPL CALC-SCNC: 3 MMOL/L (ref 2–11)
AST SERPL-CCNC: 13 U/L (ref 15–37)
BASOPHILS # BLD: 0 K/UL (ref 0–0.2)
BASOPHILS NFR BLD: 1 % (ref 0–2)
BILIRUB SERPL-MCNC: 0.9 MG/DL (ref 0.2–1.1)
BUN SERPL-MCNC: 13 MG/DL (ref 8–23)
CALCIUM SERPL-MCNC: 11 MG/DL (ref 8.3–10.4)
CHLORIDE SERPL-SCNC: 110 MMOL/L (ref 101–110)
CO2 SERPL-SCNC: 29 MMOL/L (ref 21–32)
CREAT SERPL-MCNC: 0.8 MG/DL (ref 0.6–1)
DIFFERENTIAL METHOD BLD: ABNORMAL
EOSINOPHIL # BLD: 0.1 K/UL (ref 0–0.8)
EOSINOPHIL NFR BLD: 3 % (ref 0.5–7.8)
ERYTHROCYTE [DISTWIDTH] IN BLOOD BY AUTOMATED COUNT: 12.6 % (ref 11.9–14.6)
ERYTHROCYTE [SEDIMENTATION RATE] IN BLOOD: 6 MM/HR (ref 0–30)
GLOBULIN SER CALC-MCNC: 3.4 G/DL (ref 2.8–4.5)
GLUCOSE SERPL-MCNC: 94 MG/DL (ref 65–100)
HCT VFR BLD AUTO: 37.4 % (ref 35.8–46.3)
HGB BLD-MCNC: 12 G/DL (ref 11.7–15.4)
IMM GRANULOCYTES # BLD AUTO: 0 K/UL (ref 0–0.5)
IMM GRANULOCYTES NFR BLD AUTO: 0 % (ref 0–5)
LYMPHOCYTES # BLD: 1.1 K/UL (ref 0.5–4.6)
LYMPHOCYTES NFR BLD: 36 % (ref 13–44)
MCH RBC QN AUTO: 32 PG (ref 26.1–32.9)
MCHC RBC AUTO-ENTMCNC: 32.1 G/DL (ref 31.4–35)
MCV RBC AUTO: 99.7 FL (ref 82–102)
MONOCYTES # BLD: 0.4 K/UL (ref 0.1–1.3)
MONOCYTES NFR BLD: 13 % (ref 4–12)
NEUTS SEG # BLD: 1.5 K/UL (ref 1.7–8.2)
NEUTS SEG NFR BLD: 48 % (ref 43–78)
NRBC # BLD: 0 K/UL (ref 0–0.2)
PLATELET # BLD AUTO: 195 K/UL (ref 150–450)
PMV BLD AUTO: 10.4 FL (ref 9.4–12.3)
POTASSIUM SERPL-SCNC: 3.6 MMOL/L (ref 3.5–5.1)
PROT SERPL-MCNC: 7.3 G/DL (ref 6.3–8.2)
RBC # BLD AUTO: 3.75 M/UL (ref 4.05–5.2)
SODIUM SERPL-SCNC: 142 MMOL/L (ref 133–143)
WBC # BLD AUTO: 3.1 K/UL (ref 4.3–11.1)

## 2022-11-09 PROCEDURE — 1123F ACP DISCUSS/DSCN MKR DOCD: CPT | Performed by: INTERNAL MEDICINE

## 2022-11-09 PROCEDURE — 85652 RBC SED RATE AUTOMATED: CPT

## 2022-11-09 PROCEDURE — 99214 OFFICE O/P EST MOD 30 MIN: CPT | Performed by: INTERNAL MEDICINE

## 2022-11-09 PROCEDURE — G8427 DOCREV CUR MEDS BY ELIG CLIN: HCPCS | Performed by: INTERNAL MEDICINE

## 2022-11-09 PROCEDURE — 85025 COMPLETE CBC W/AUTO DIFF WBC: CPT

## 2022-11-09 PROCEDURE — 1090F PRES/ABSN URINE INCON ASSESS: CPT | Performed by: INTERNAL MEDICINE

## 2022-11-09 PROCEDURE — 1036F TOBACCO NON-USER: CPT | Performed by: INTERNAL MEDICINE

## 2022-11-09 PROCEDURE — G8417 CALC BMI ABV UP PARAM F/U: HCPCS | Performed by: INTERNAL MEDICINE

## 2022-11-09 PROCEDURE — 36415 COLL VENOUS BLD VENIPUNCTURE: CPT

## 2022-11-09 PROCEDURE — 80053 COMPREHEN METABOLIC PANEL: CPT

## 2022-11-09 PROCEDURE — G8484 FLU IMMUNIZE NO ADMIN: HCPCS | Performed by: INTERNAL MEDICINE

## 2022-11-09 ASSESSMENT — PATIENT HEALTH QUESTIONNAIRE - PHQ9
SUM OF ALL RESPONSES TO PHQ QUESTIONS 1-9: 1
2. FEELING DOWN, DEPRESSED OR HOPELESS: 1
1. LITTLE INTEREST OR PLEASURE IN DOING THINGS: 0
SUM OF ALL RESPONSES TO PHQ QUESTIONS 1-9: 1
SUM OF ALL RESPONSES TO PHQ9 QUESTIONS 1 & 2: 1
SUM OF ALL RESPONSES TO PHQ QUESTIONS 1-9: 1
SUM OF ALL RESPONSES TO PHQ QUESTIONS 1-9: 1

## 2022-11-09 NOTE — PATIENT INSTRUCTIONS
Patient Instructions from Today's Visit    Reason for Visit:  Follow up Anemia    Plan: Today you are NOT Anemic  White cells are slightly low but not alarming  Continue to monitor labs    Follow Up:   Follow up in 6 months with labs 10 days prior    Recent Lab Results:  Hospital Outpatient Visit on 11/09/2022   Component Date Value Ref Range Status    WBC 11/09/2022 3.1 (A)  4.3 - 11.1 K/uL Final    RBC 11/09/2022 3.75 (A)  4.05 - 5.2 M/uL Final    Hemoglobin 11/09/2022 12.0  11.7 - 15.4 g/dL Final    Hematocrit 11/09/2022 37.4  35.8 - 46.3 % Final    MCV 11/09/2022 99.7  82.0 - 102.0 FL Final    MCH 11/09/2022 32.0  26.1 - 32.9 PG Final    MCHC 11/09/2022 32.1  31.4 - 35.0 g/dL Final    RDW 11/09/2022 12.6  11.9 - 14.6 % Final    Platelets 97/21/7119 195  150 - 450 K/uL Final    MPV 11/09/2022 10.4  9.4 - 12.3 FL Final    nRBC 11/09/2022 0.00  0.0 - 0.2 K/uL Final    **Note: Absolute NRBC parameter is now reported with Hemogram**    Differential Type 11/09/2022 AUTOMATED    Final    Seg Neutrophils 11/09/2022 48  43 - 78 % Final    Lymphocytes 11/09/2022 36  13 - 44 % Final    Monocytes 11/09/2022 13 (A)  4.0 - 12.0 % Final    Eosinophils % 11/09/2022 3  0.5 - 7.8 % Final    Basophils 11/09/2022 1  0.0 - 2.0 % Final    Immature Granulocytes 11/09/2022 0  0.0 - 5.0 % Final    Segs Absolute 11/09/2022 1.5 (A)  1.7 - 8.2 K/UL Final    Absolute Lymph # 11/09/2022 1.1  0.5 - 4.6 K/UL Final    Absolute Mono # 11/09/2022 0.4  0.1 - 1.3 K/UL Final    Absolute Eos # 11/09/2022 0.1  0.0 - 0.8 K/UL Final    Basophils Absolute 11/09/2022 0.0  0.0 - 0.2 K/UL Final    Absolute Immature Granulocyte 11/09/2022 0.0  0.0 - 0.5 K/UL Final    Sed Rate, Automated 11/09/2022 6  0 - 30 mm/hr Final         Treatment Summary has been discussed and given to patient: n/a        -------------------------------------------------------------------------------------------------------------------  Please call our office at [phone] if you have any  of the following symptoms:   Fever of 100.5 or greater  Chills  Shortness of breath  Swelling or pain in one leg    After office hours an answering service is available and will contact a provider for emergencies or if you are experiencing any of the above symptoms. Patient does express an interest in My Chart. My Chart log in information explained on the after visit summary printout at the [facility] [address] [address] desk.     Sandre Hatchet, RN

## 2022-11-09 NOTE — PROGRESS NOTES
Data Source: Patient, [facility] record. 11/9/2022    3:23 PM    Beba Faustin [id number]    80 y.o. Patient Encounter: [address] Visit    Heme Diagnosis:  Anemia, wt loss, hypercalcemia  Heme History (Copied from prior):   78-year-old female history of hyperparathyroidism for over 10 years (labs scanned in from 8/18 show calcium of 11.6 and PTH of 128 with upper limits of normal being 64 for lab, follows with Dr. Phan Sewell at [facility] in [place] for this, has been offered parathyroidectomy in past), hypertension/dyslipidemia, multinodular goiter, stage IIIa CKD, TIA, osteoporosis, GI bleeding, vitamin D deficiency, carotid artery stenosis, CVA, sinus surgery, more recently seen by primary care with significantly elevated calcium of 14.2 (more than what is usual for her). Referred to [facility] ED 12/7/2021 where calcium was 13.9, treated with IV NS, zoledronic acid and Lasix. CBC also showed mild anemia. Note also made of 20-25 pound weight loss over the last year or so. Patient is now referred to us with anemia, hypercalcemia and concern for possible multiple myeloma. Last mammogram was multiple years ago. Denies any new breast lump or bumps. Last colonoscopy was also many years ago. Denies any bleeding. Patient herself is asymptomatic. Denies any drenching night sweats, new lumps or bumps. Appetite at baseline. Has walker by her side today. Interval History:  11/9/2022: Since last visit, patient reports doing reasonably. Denies any bleeding. Denies any recent infections. Accompanied with daughter. Blood work today with resolution of anemia, platelet count normal.  Mild leukopenia/neutropenia persists. She is currently not interested in a bone marrow biopsy. She is agreeable to further blood work in relation to this with her next visit. REVIEW OF SYSTEMS:  As mentioned above, all other systems were reviewed in full and are negative.     Past Medical History: Diagnosis Date    Abnormality of gait 5/20/2015    Allergic rhinitis 6/4/2013    Asymptomatic bilateral carotid artery stenosis 10/14/2014    Cerebrovascular disease 5/20/2015    Essential hypertension, benign 6/4/2013    GI bleed     due to ulcer    Glaucoma 5/20/2015    Hx of seasonal allergies     Hyperparathyroidism ([facility] 6/4/2013    Occlusion and stenosis of carotid artery without mention of cerebral infarction 5/20/2015    Osteoporosis, unspecified 6/4/2013    Other and unspecified hyperlipidemia 6/4/2013    Other decreased white blood cell count 6/4/2013    PUD (peptic ulcer disease)     SNHL (sensorineural hearing loss) 6/4/2013    Stroke ([facility] [place] 2006    TIA     Toxic multinodular goiter 6/4/2013    Toxic multinodular goiter without mention of thyrotoxic crisis or storm 6/4/2013    hyperthyroidism     Transient ischemic attack (TIA), and cerebral infarction without residual deficits(V12.54) 6/4/2013    Vitamin D deficiency        Past Surgical History:   Procedure Laterality Date    CATARACT REMOVAL Bilateral     COLONOSCOPY      WISDOM TOOTH EXTRACTION         Current Outpatient Medications   Medication Sig Dispense Refill    dorzolamide-timolol (COSOPT) 22.3-6.8 MG/ML ophthalmic solution       latanoprost (XALATAN) 0.005 % ophthalmic solution       pravastatin (PRAVACHOL) 10 MG tablet Take 1 tablet by mouth daily 90 tablet 1    irbesartan (AVAPRO) 300 MG tablet Take 1 tablet by mouth daily 90 tablet 2    COD LIVER OIL PO Take by mouth      ECHINACEA PO Take by mouth      chlorthalidone (HYGROTON) 25 MG tablet TAKE 1/2 TABLET ONCE DAILY      vitamin D 25 MCG (1000 UT) CAPS Take by mouth      cinacalcet (SENSIPAR) 30 MG tablet TAKE 1 TABLET BY MOUTH EVERY DAY      clopidogrel (PLAVIX) 75 MG tablet Take 75 mg by mouth daily      fexofenadine (ALLEGRA) 180 MG tablet Take by mouth daily as needed      methIMAzole (TAPAZOLE) 5 MG tablet Take one/half tab 3 times per week      montelukast (SINGULAIR) 10 MG tablet Take 10 mg by mouth daily      polyethylene glycol (GLYCOLAX) 17 GM/SCOOP powder DISSOLVE 17 GRAMS INTO LIQUID THEN DRINK BY MOUHT ONCE A DAY as needed (Patient not taking: Reported on 11/9/2022)      Travoprost, BAK Free, (TRAVATAN Z) 0.004 % SOLN ophthalmic solution Apply 1 drop to eye every evening (Patient not taking: No sig reported)       No current facility-administered medications for this visit. Social History     Socioeconomic History    Marital status:      Spouse name: None    Number of children: None    Years of education: None    Highest education level: None   Tobacco Use    Smoking status: Never    Smokeless tobacco: Never   Substance and Sexual Activity    Alcohol use: Yes    Drug use: No       Family History   Problem Relation Age of Onset    Glaucoma Son     Hypertension Sister     No Known Problems Sister     Thyroid Disease Sister     Hypertension Son     Thyroid Disease Mother         goiter    Diabetes Mother     Drug Abuse Son     Hypertension Daughter     Other Mother         brain aneurysm        Allergies   Allergen Reactions    Ezetimibe Other (See Comments)       PHYSICAL EXAMINATION:  General Appearance: Healthy appearing patient in no acute distress  Vitals reviewed. BP (!) 169/88   Pulse 72   Temp 98.7 °F (37.1 °C) (Oral)   Resp 16   Ht 5' 3\" (1.6 m)   Wt 165 lb 3.2 oz (74.9 kg)   SpO2 96%   BMI 29.26 kg/m²   HEENT: No oral or pharyngeal masses, ulceration or thrush noted, no sinus tenderness. Neck is supple with no thyromegaly or JVD noted. Lymph Nodes: No lymphadenopathy noted in the occipital, pre and post auricular, cervical, supra and infraclavicular, axillary, epitrochlear, inguinal, and popliteal region. Breasts: No palpable masses, nipple discharge or skin retraction  Lungs/Thorax: Clear to auscultation, no accessory muscles of respiration being used.   Heart: Regular rate and rhythm, normal S1, S2, no appreciable murmurs, rubs, gallops  Abdomen: Soft, nontender, bowel sounds present, no appreciable hepatosplenomegaly, no palpable masses  Extremeties: Good pulses bilaterally, no peripheral edema. Skin: Normal skin tone with no rash, petechiae, ecchymosis noted. Musculoskeletal: No pain on palpation over bony prominence, no edema, no evidence of gout, no joint or bony deformity  Neurologic: Grossly intact        LABS/IMAGING:    Lab Results   Component Value Date/Time    WBC 3.1 11/09/2022 02:36 PM    HGB 12.0 11/09/2022 02:36 PM    HCT 37.4 11/09/2022 02:36 PM     11/09/2022 02:36 PM    MCV 99.7 11/09/2022 02:36 PM       Lab Results   Component Value Date/Time     11/09/2022 02:36 PM    K 3.6 11/09/2022 02:36 PM     11/09/2022 02:36 PM    CO2 29 11/09/2022 02:36 PM    BUN 13 11/09/2022 02:36 PM    GFRAA >60 05/09/2022 12:56 PM    GLOB 3.4 11/09/2022 02:36 PM    ALT 17 11/09/2022 02:36 PM       Above results reviewed with patient. ASSESSMENT:  66-year-old female history of hyperparathyroidism for over 10 years (labs scanned in from 8/18 show calcium of 11.6 and PTH of 128 with upper limits of normal being 64 for lab, follows with Dr. Sloan Bautista at [facility] in [place] for this, has been offered parathyroidectomy in past), hypertension/dyslipidemia, multinodular goiter, stage IIIa CKD, TIA, osteoporosis, GI bleeding, vitamin D deficiency, carotid artery stenosis, CVA, sinus surgery, more recently seen by primary care with significantly elevated calcium of 14.2 (more than what is usual for her). Referred to [facility] ED 12/7/2021 where calcium was 13.9, treated with IV NS, zoledronic acid and Lasix. CBC also showed mild anemia. Note also made of 20-25 pound weight loss over the last year or so. Patient is now referred to us with anemia, hypercalcemia and concern for possible multiple myeloma. Last mammogram was multiple years ago. Denies any new breast lump or bumps. Last colonoscopy was also many years ago. Denies any bleeding. Patient herself is asymptomatic. Denies any drenching night sweats, new lumps or bumps. Appetite at baseline. Has walker by her side today. We will work-up as below:    2/1/2022: Blood work from last visit reviewed: Iron stores, P62 and folic acid levels adequate. SPEP with LUPE without monoclonality. Peripheral blood flow cytometry negative. Ca remains elevated at 12.1, PTH significantly high at 228.5 ( upper limit of lab 88). PTH related peptide negative. CT CAP with anterior bladder wall thickening and nodularity with a large enhancing bladder nodule arising out of left bladder wall measuring 3 cm, bladder malignancy suspected: Will refer to urologic oncology. Multiple low-attenuation liver and renal lesions described as stable from 10/2017. Patient advised to follow-up with her endocrinologist given uncontrolled hypercalcemia and PTH levels. Mild leukopenia and anemia stable and can be monitored. We will see her back in 3 months time. 5/9/2022: Since last visit patient has seen urologic oncology Dr. Alissa Batista, and underwent cystoscopy showing impressive amount of low-grade appearing papillary tumors involving at least half of her bladder mucosa. She was offered TURBT, has also sought out second opinion was reportedly similar recommendations. However she has opted, at her age to simply monitor and for \"prayers to make tumor go away\". Labs today without further hypocalcemia. Counts with ongoing mild leukopenia/neutropenia, as well as modest anemia with hemoglobin 10.5, stable. Recent iron stores, R64 and folic acid normal.  We discussed mild stable leukopenia/anemia, and further work-up involving bone marrow biopsy. For now she has made an educated decision to defer further bone marrow biopsy and simply monitor counts moving forward. As such, we will see her back in 6 months with repeat labs. 11/9/2022: Since last visit, patient reports doing reasonably. Denies any bleeding.   Denies any recent infections. Accompanied with daughter. Blood work today with resolution of anemia, platelet count normal.  Mild leukopenia/neutropenia persists. She is currently not interested in a bone marrow biopsy. She is agreeable to further blood work in relation to this with her next visit. Continue following with her primary care as needed. We will see her back in 6 months. 1.  Hypercalcemia in setting of hyperparathyroidism  2. 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 Mild leukopenia. Anemia resolved    PLAN:  - As above. Continue to monitor counts for now. Hold off on bm biopsy for now per pt preference. 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 RTC in 6 months with labs, ESR, peripheral blood smear, peripheral blood flow cytometry, B12, folate, MMA/HC, copper/zinc, vit D, Hep panel, HIV, RAMON, RF, anti-CCP    Thank you Dr Erinn Greene for allowing us to paticipate in care of this pleasant patient.  Please call w/ any quesions        Idania Marquez MD  [facility]  Hematology Oncology  [id number] [place] [address]  Office : [phone]  Fax : [phone]

## 2022-12-04 NOTE — PROGRESS NOTES
Galilea Marcano (:  1934) is a Established patient, here for evaluation of the following:    Assessment & Plan   Below is the assessment and plan developed based on review of pertinent history, physical exam, labs, studies, and medications. 1. Hyperparathyroidism ([address]  Labs from 2022 with elevated PTH at 228.5, elevated serum calcium at 12.1, low PTH related peptide level, normal SPEP without M spike noted, elevated free kappa light chain at 23.4  Flow cytometry from 2022 without diagnostic immunophenotypic abnormalities detected  CT chest/abdomen/pelvis with contrast on 2022 with markedly enlarged and lobulated thyroid gland, multiple low attenuating liver lesions, anterior nodular bladder wall thickening with enhancing bladder nodule extending from anterior bladder wall measuring 3 x 2.8 cm concerning for malignancy  Previously on bisphosphonate   Continue Cinacalcet per endocrinology    2. Essential hypertension  Chlorthalidone previously stopped given hypercalcemia  Continue irbesartan    3. Dyslipidemia  Intolerance to Zetia  Continue pravastatin    4. Multinodular goiter  5. Hyperthyroidism  Thyroid ultrasound on 2014 with multinodular goiter with largest solid nodule in left thyroid lobe measuring 5.4 cm in diameter  Markedly enlarged and lobulated thyroid gland noted on CT scan of chest/abdomen/pelvis on 2022  Continue methimazole per endocrinology    6. Bilateral carotid artery stenosis  Carotid ultrasound on 2/15/2022 with less than 50% bilateral ICA stenosis  Continue Plavix and pravastatin  Follow-up with vascular surgery as scheduled    7. Bladder mass  CT scan as noted above  Cystoscopy on 3/4/2022 with impressive amount of low-grade appearing papillary tumors involving at least one half of the bladder mucosa and the majority located on the dome, anterior bladder and right sidewall and floor  Recommendations from urology/oncology for TURBT.   Similar recommendations from second opinion at [facility]  Patient elects not to have surgery and to continue monitoring at this time    8. Age-related osteoporosis without current pathological fracture  DEXA scan on 3/14/2022 with 10-year overall fracture risk 5.1% and 10-year hip fracture risk 1.4%  Status post 5+ year course of bisphosphonate     9. Allergy, sequela  Continue Singulair Allegra    10. Peripheral vascular disease ([address]  Lower extremity arterial ultrasound on 2/15/2022 with abnormal right lower extremity MAY at 0.95, mild diffuse atherosclerosis with retrograde flow in distal PTA; abnormal left lower extremity MAY at 0.82 with mild diffuse atherosclerosis with retrograde flow in distal CEZAR and distal peroneal  Continue Plavix, pravastatin  Follow-up with vascular surgery    11. Mitral valve insufficiency, unspecified etiology  Echocardiogram on 10/11/2022 as follows: EF of 50 to 93%, LV diastolic dysfunction, moderately increased LV wall thickness, mild global hypokinesis present, severely dilated LA with dilated RA, mild AR, moderate MR, mild to moderate TR, mild VA  Per cardiology recommendations to repeat echocardiogram in 1 year    No follow-ups on file.        Subjective   HPI  Review of Systems       Objective   Patient-Reported Vitals  No data recorded     Physical Exam  [INSTRUCTIONS:  \"[x]\" Indicates a positive item  \"[]\" Indicates a negative item  -- DELETE ALL ITEMS NOT EXAMINED]    Constitutional: [x] Appears well-developed and well-nourished [x] No apparent distress      [] Abnormal -     Mental status: [x] Alert and awake  [x] Oriented to person/place/time [x] Able to follow commands    [] Abnormal -     Eyes:   EOM    [x]  Normal    [] Abnormal -   Sclera  [x]  Normal    [] Abnormal -          Discharge [x]  None visible   [] Abnormal -     HENT: [x] Normocephalic, atraumatic  [] Abnormal -   [x] Mouth/Throat: Mucous membranes are moist    External Ears [x] Normal  [] Abnormal -    Neck: [x] No visualized mass [] Abnormal -     Pulmonary/Chest: [x] Respiratory effort normal   [x] No visualized signs of difficulty breathing or respiratory distress        [] Abnormal -      Musculoskeletal:   [x] Normal gait with no signs of ataxia         [x] Normal range of motion of neck        [] Abnormal -     Neurological:        [x] No Facial Asymmetry (Cranial nerve 7 motor function) (limited exam due to video visit)          [x] No gaze palsy        [] Abnormal -          Skin:        [x] No significant exanthematous lesions or discoloration noted on facial skin         [] Abnormal -            Psychiatric:       [x] Normal Affect [] Abnormal -        [x] No Hallucinations    Other pertinent observable physical exam findings:-         {Time Documentation Optional:[id number]}    Lilliam Song, was evaluated through a synchronous (real-time) audio-video encounter. The patient (or guardian if applicable) is aware that this is a billable service, which includes applicable co-pays. This Virtual Visit was conducted with patient's (and/or legal guardian's) consent. The visit was conducted pursuant to the emergency declaration under the [address] [place] [address] authority and the [employer] General Act. Patient identification was verified, and a caregiver was present when appropriate. The patient was located at {[employer] - Patient:[id number]}. Provider was located at {[employer] - Provider:[id number]}.         --Tequila Newton DO

## 2022-12-05 ENCOUNTER — TELEMEDICINE (OUTPATIENT)
Dept: INTERNAL MEDICINE CLINIC | Facility: CLINIC | Age: 87
End: 2022-12-05

## 2022-12-05 DIAGNOSIS — E21.3 HYPERPARATHYROIDISM (HCC): Primary | ICD-10-CM

## 2022-12-05 DIAGNOSIS — E78.5 DYSLIPIDEMIA: ICD-10-CM

## 2022-12-05 DIAGNOSIS — I65.23 BILATERAL CAROTID ARTERY STENOSIS: ICD-10-CM

## 2022-12-05 DIAGNOSIS — N32.89 BLADDER MASS: ICD-10-CM

## 2022-12-05 DIAGNOSIS — T78.40XS ALLERGY, SEQUELA: ICD-10-CM

## 2022-12-05 DIAGNOSIS — E04.2 MULTINODULAR GOITER: ICD-10-CM

## 2022-12-05 DIAGNOSIS — I34.0 MITRAL VALVE INSUFFICIENCY, UNSPECIFIED ETIOLOGY: ICD-10-CM

## 2022-12-05 DIAGNOSIS — I73.9 PERIPHERAL VASCULAR DISEASE (HCC): ICD-10-CM

## 2022-12-05 DIAGNOSIS — E05.90 HYPERTHYROIDISM: ICD-10-CM

## 2022-12-05 DIAGNOSIS — I10 ESSENTIAL HYPERTENSION: ICD-10-CM

## 2022-12-05 DIAGNOSIS — M81.0 AGE-RELATED OSTEOPOROSIS WITHOUT CURRENT PATHOLOGICAL FRACTURE: ICD-10-CM

## 2022-12-06 NOTE — PROGRESS NOTES
Provider was running late for patient's virtual appointment on 12/5/2022. Given scheduling conflicts and to allow for adequate time for visit patient will be rescheduled. Patient's daughter agreed and verbalized understanding.

## 2022-12-14 ENCOUNTER — TELEPHONE (OUTPATIENT)
Dept: INTERNAL MEDICINE CLINIC | Facility: CLINIC | Age: 87
End: 2022-12-14

## 2022-12-14 DIAGNOSIS — E21.3 HYPERPARATHYROIDISM (HCC): Primary | ICD-10-CM

## 2022-12-14 DIAGNOSIS — E04.2 MULTINODULAR GOITER: ICD-10-CM

## 2022-12-14 DIAGNOSIS — E78.5 DYSLIPIDEMIA: ICD-10-CM

## 2022-12-14 DIAGNOSIS — I10 ESSENTIAL HYPERTENSION: ICD-10-CM

## 2022-12-14 DIAGNOSIS — E05.90 HYPERTHYROIDISM: ICD-10-CM

## 2022-12-14 NOTE — TELEPHONE ENCOUNTER
----- Message from Charu Bhardwaj sent at 12/13/2022  4:29 PM EST -----  Subject: Referral Request    Reason for referral request? patient needs blood work done in January   Endocrinologist would like blood work drawn and have PCP to start handling   that and would like it sent to Dr. Bere Valencia in [place]  Provider patient wants to be referred to(if known):     Provider Phone Number(if known):     Additional Information for Provider?   ---------------------------------------------------------------------------  --------------  [id number]    [phone]; OK to leave message on voicemail  ---------------------------------------------------------------------------  --------------

## 2022-12-14 NOTE — TELEPHONE ENCOUNTER
----- Message from Pradip Desir sent at 12/14/2022 10:42 AM EST -----  Subject: Message to Provider    QUESTIONS  Information for Provider? Patient is calling back about the bloodwork in   January. She would like a status update. Please call to advise.  ---------------------------------------------------------------------------  --------------  Ron Fisher [facility]  [phone]; OK to leave message on voicemail  ---------------------------------------------------------------------------  --------------  SCRIPT ANSWERS  Relationship to Patient?  Self

## 2022-12-15 NOTE — TELEPHONE ENCOUNTER
Patients daughter called back and stated that it is just the labs they are requesting Dr. Willem Allen to order. Please place orders.

## 2022-12-15 NOTE — TELEPHONE ENCOUNTER
Lab orders placed. Please see if specialist is requiring any additional labs besides the ones ordered.      Orders Placed This Encounter    CBC with Auto Differential     Standing Status:   Future     Standing Expiration Date:   12/15/2023    Comprehensive Metabolic Panel     Standing Status:   Future     Standing Expiration Date:   6/15/2023    Lipid Panel     Standing Status:   Future     Standing Expiration Date:   6/15/2023    T4, Free     Standing Status:   Future     Standing Expiration Date:   12/15/2023    TSH     Standing Status:   Future     Standing Expiration Date:   12/15/2023    Vitamin D 25 Hydroxy     Standing Status:   Future     Standing Expiration Date:   6/15/2023    PTH, Intact     Standing Status:   Future     Standing Expiration Date:   12/15/2023    Phosphorus     Standing Status:   Future     Standing Expiration Date:   12/15/2023    Calcium, Ionized     Standing Status:   Future     Standing Expiration Date:   12/15/2023

## 2022-12-19 NOTE — TELEPHONE ENCOUNTER
Pt daughter advised  Dr Corazon Dee did put lab orders in and daughter sts the only thing Endo was needing check is pt thyroid.

## 2023-03-18 NOTE — PROGRESS NOTES
Shyla Leiva (:  1934) is a 80 y.o. female,Established patient, here for evaluation of the following chief complaint(s):  Follow-up and Hypertension         ASSESSMENT/PLAN:  1. Essential hypertension  Previously on chlorthalidone however stopped in setting of hypercalcemia  Repeat blood pressure in office today mildly improved but still elevated. Patient reports checking blood pressure once or twice a day at home with values typically in the 228F systolic. Advised her to check blood pressure at home this afternoon and contact office with readings  Continue irbesartan    2. Hyperparathyroidism ([facility]  Labs from 2022 with elevated PTH at 228.5, elevated serum calcium at 12.1, low PTH related peptide level, normal SPEP without M spike noted, elevated free kappa light chain at 23.4  Flow cytometry from 2022 without diagnostic immunophenotypic abnormalities detected  CT chest/abdomen/pelvis with contrast on 2022 with markedly enlarged and lobulated thyroid gland, multiple low attenuating liver lesions, anterior nodular bladder wall thickening with enhancing bladder nodule extending from anterior bladder wall measuring 3 x 2.8 cm concerning for malignancy  Recheck ionized calcium, PTH, PTH RP (especially in setting of bladder mass), phosphorus, vitamin D levels  Previously on alendronate  Continue Cinacalcet per endocrinology    4. Multinodular goiter   5. Hyperthyroidism  Thyroid ultrasound on 2014 with multinodular goiter with largest solid nodule in left thyroid lobe measuring 5.4 cm in diameter  Markedly enlarged and lobulated thyroid gland noted on CT scan of chest/abdomen/pelvis on 2022  Check TSH and free T4  Continue methimazole per endocrinology    6.  Bladder mass  CT scan as noted above  Cystoscopy on 3/4/2022 with impressive amount of low-grade appearing papillary tumors involving at least one half of the bladder mucosa and the majority located on the dome, anterior

## 2023-03-20 ENCOUNTER — OFFICE VISIT (OUTPATIENT)
Dept: INTERNAL MEDICINE CLINIC | Facility: CLINIC | Age: 88
End: 2023-03-20
Payer: MEDICARE

## 2023-03-20 VITALS
TEMPERATURE: 98.1 F | DIASTOLIC BLOOD PRESSURE: 86 MMHG | WEIGHT: 163 LBS | OXYGEN SATURATION: 96 % | HEART RATE: 71 BPM | SYSTOLIC BLOOD PRESSURE: 160 MMHG | BODY MASS INDEX: 28.87 KG/M2

## 2023-03-20 DIAGNOSIS — I10 ESSENTIAL HYPERTENSION: Primary | ICD-10-CM

## 2023-03-20 DIAGNOSIS — E78.5 DYSLIPIDEMIA: ICD-10-CM

## 2023-03-20 DIAGNOSIS — E21.3 HYPERPARATHYROIDISM (HCC): ICD-10-CM

## 2023-03-20 DIAGNOSIS — I50.32 CHRONIC HEART FAILURE WITH PRESERVED EJECTION FRACTION (HCC): ICD-10-CM

## 2023-03-20 DIAGNOSIS — E04.2 MULTINODULAR GOITER: ICD-10-CM

## 2023-03-20 DIAGNOSIS — N32.89 BLADDER MASS: ICD-10-CM

## 2023-03-20 DIAGNOSIS — E05.90 HYPERTHYROIDISM: ICD-10-CM

## 2023-03-20 PROCEDURE — 1123F ACP DISCUSS/DSCN MKR DOCD: CPT | Performed by: STUDENT IN AN ORGANIZED HEALTH CARE EDUCATION/TRAINING PROGRAM

## 2023-03-20 PROCEDURE — 99214 OFFICE O/P EST MOD 30 MIN: CPT | Performed by: STUDENT IN AN ORGANIZED HEALTH CARE EDUCATION/TRAINING PROGRAM

## 2023-03-20 ASSESSMENT — ENCOUNTER SYMPTOMS
SHORTNESS OF BREATH: 0
VOMITING: 1
NAUSEA: 1
ABDOMINAL PAIN: 0

## 2023-04-03 ENCOUNTER — OFFICE VISIT (OUTPATIENT)
Dept: VASCULAR SURGERY | Age: 88
End: 2023-04-03
Payer: MEDICARE

## 2023-04-03 VITALS
HEART RATE: 77 BPM | DIASTOLIC BLOOD PRESSURE: 96 MMHG | HEIGHT: 63 IN | WEIGHT: 165 LBS | SYSTOLIC BLOOD PRESSURE: 209 MMHG | BODY MASS INDEX: 29.23 KG/M2 | OXYGEN SATURATION: 99 %

## 2023-04-03 DIAGNOSIS — I73.9 PAD (PERIPHERAL ARTERY DISEASE) (HCC): ICD-10-CM

## 2023-04-03 DIAGNOSIS — I65.23 ASYMPTOMATIC BILATERAL CAROTID ARTERY STENOSIS: Primary | ICD-10-CM

## 2023-04-03 PROCEDURE — 99213 OFFICE O/P EST LOW 20 MIN: CPT | Performed by: NURSE PRACTITIONER

## 2023-04-03 PROCEDURE — 1123F ACP DISCUSS/DSCN MKR DOCD: CPT | Performed by: NURSE PRACTITIONER

## 2023-04-06 ENCOUNTER — TELEPHONE (OUTPATIENT)
Dept: ONCOLOGY | Age: 88
End: 2023-04-06

## 2023-04-11 RX ORDER — CLOPIDOGREL BISULFATE 75 MG/1
TABLET ORAL
Qty: 90 TABLET | Refills: 0 | OUTPATIENT
Start: 2023-04-11

## 2023-04-20 ENCOUNTER — TELEPHONE (OUTPATIENT)
Dept: CARDIOLOGY CLINIC | Age: 88
End: 2023-04-20

## 2023-04-20 NOTE — TELEPHONE ENCOUNTER
I returned the pts daughter call and informed her that Dr. Kanchan Coates isn't doing virtual visits at this time. She verbalized understanding.

## 2023-04-20 NOTE — TELEPHONE ENCOUNTER
Patients daughter called stating she wanted to know if there is a possibility to conduct a virtual appointment on 4/25? Advised we were no longer routinely offering these since Covid has subsided.

## 2023-04-25 ENCOUNTER — OFFICE VISIT (OUTPATIENT)
Dept: CARDIOLOGY CLINIC | Age: 88
End: 2023-04-25
Payer: MEDICARE

## 2023-04-25 VITALS
DIASTOLIC BLOOD PRESSURE: 94 MMHG | BODY MASS INDEX: 29.13 KG/M2 | WEIGHT: 164.4 LBS | HEIGHT: 63 IN | SYSTOLIC BLOOD PRESSURE: 184 MMHG | HEART RATE: 72 BPM

## 2023-04-25 DIAGNOSIS — I34.0 NONRHEUMATIC MITRAL VALVE REGURGITATION: ICD-10-CM

## 2023-04-25 PROCEDURE — 1123F ACP DISCUSS/DSCN MKR DOCD: CPT | Performed by: INTERNAL MEDICINE

## 2023-04-25 PROCEDURE — 99213 OFFICE O/P EST LOW 20 MIN: CPT | Performed by: INTERNAL MEDICINE

## 2023-04-25 ASSESSMENT — ENCOUNTER SYMPTOMS
SHORTNESS OF BREATH: 0
PHOTOPHOBIA: 0
EYE PAIN: 0
RESPIRATORY NEGATIVE: 1
CHEST TIGHTNESS: 0
ABDOMINAL PAIN: 0
EYES NEGATIVE: 1
ALLERGIC/IMMUNOLOGIC NEGATIVE: 1
BACK PAIN: 0
GASTROINTESTINAL NEGATIVE: 1

## 2023-04-25 NOTE — PROGRESS NOTES
[facility] CARDIOLOGY  [address], [address]  PHONE: [phone]      23    NAME:  Rica Gill  : 1934  MRN: [id number]         SUBJECTIVE:   Rica Gill is a 80 y.o. female seen for follow up of:      Chief Complaint   Patient presents with    Hypertension        Cardiac Hx:  1) Abnormal ECG noted LVH with QRS widening  2) Echo  Echo shows mild to mod AR  10/11/22 LVEF 50-55% with moderate MR, mild AR  3) TIA in 2006 - on plavix as a result  4) HTN    HPI:  No signs of heart failure shortness of breath dyspnea on exertion. Doing well. Blood pressure is elevated today but at home is controlled. Past Medical History, Past Surgical History, Family history, Social History, and Medications were all reviewed with the patient today and updated as necessary.        Current Outpatient Medications:     irbesartan (AVAPRO) 300 MG tablet, Take 1 tablet by mouth daily, Disp: 90 tablet, Rfl: 3    pravastatin (PRAVACHOL) 10 MG tablet, Take 1 tablet by mouth daily, Disp: 90 tablet, Rfl: 3    clopidogrel (PLAVIX) 75 MG tablet, Take 1 tablet by mouth daily, Disp: 90 tablet, Rfl: 3    brimonidine (ALPHAGAN P) 0.1 % SOLN, 1 drop 2 times daily, Disp: , Rfl:     latanoprost (XALATAN) 0.005 % ophthalmic solution, , Disp: , Rfl:     COD LIVER OIL PO, Take by mouth, Disp: , Rfl:     ECHINACEA PO, Take by mouth, Disp: , Rfl:     chlorthalidone (HYGROTON) 25 MG tablet, TAKE 1/2 TABLET ONCE DAILY, Disp: , Rfl:     vitamin D 25 MCG (1000 UT) CAPS, Take by mouth, Disp: , Rfl:     cinacalcet (SENSIPAR) 30 MG tablet, TAKE 1 TABLET BY MOUTH EVERY DAY, Disp: , Rfl:     fexofenadine (ALLEGRA) 180 MG tablet, Take by mouth daily as needed, Disp: , Rfl:     methIMAzole (TAPAZOLE) 5 MG tablet, Take one/half tab 3 times per week, Disp: , Rfl:     montelukast (SINGULAIR) 10 MG tablet, Take 1 tablet by mouth daily, Disp: , Rfl:     Travoprost, BAK Free, (TRAVATAN Z) 0.004 % SOLN ophthalmic solution,

## 2023-06-13 DIAGNOSIS — E04.2 MULTINODULAR GOITER: ICD-10-CM

## 2023-06-13 DIAGNOSIS — N32.89 BLADDER MASS: ICD-10-CM

## 2023-06-13 DIAGNOSIS — E78.5 DYSLIPIDEMIA: ICD-10-CM

## 2023-06-13 DIAGNOSIS — E21.3 HYPERPARATHYROIDISM (HCC): ICD-10-CM

## 2023-06-13 DIAGNOSIS — I10 ESSENTIAL HYPERTENSION: ICD-10-CM

## 2023-06-13 DIAGNOSIS — E05.90 HYPERTHYROIDISM: ICD-10-CM

## 2023-06-13 LAB
AMORPH CRY URNS QL MICRO: ABNORMAL
APPEARANCE UR: ABNORMAL
BACTERIA URNS QL MICRO: 0 /HPF
BILIRUB UR QL: NEGATIVE
COLOR UR: ABNORMAL
EPI CELLS #/AREA URNS HPF: ABNORMAL /HPF
GLUCOSE UR STRIP.AUTO-MCNC: NEGATIVE MG/DL
HGB UR QL STRIP: NEGATIVE
KETONES UR QL STRIP.AUTO: NEGATIVE MG/DL
LEUKOCYTE ESTERASE UR QL STRIP.AUTO: ABNORMAL
NITRITE UR QL STRIP.AUTO: NEGATIVE
OTHER OBSERVATIONS: ABNORMAL
PH UR STRIP: 7.5 (ref 5–9)
PROT UR STRIP-MCNC: NEGATIVE MG/DL
SP GR UR REFRACTOMETRY: 1.01 (ref 1–1.02)
UROBILINOGEN UR QL STRIP.AUTO: 0.2 EU/DL (ref 0.2–1)
WBC URNS QL MICRO: ABNORMAL /HPF

## 2023-06-14 LAB
25(OH)D3 SERPL-MCNC: 37.3 NG/ML (ref 30–100)
ALBUMIN SERPL-MCNC: 3.9 G/DL (ref 3.2–4.6)
ALBUMIN/GLOB SERPL: 1.3 (ref 0.4–1.6)
ALP SERPL-CCNC: 99 U/L (ref 50–136)
ALT SERPL-CCNC: 18 U/L (ref 12–65)
ANION GAP SERPL CALC-SCNC: 2 MMOL/L (ref 2–11)
AST SERPL-CCNC: 15 U/L (ref 15–37)
BASOPHILS # BLD: 0 K/UL (ref 0–0.2)
BASOPHILS NFR BLD: 1 % (ref 0–2)
BILIRUB SERPL-MCNC: 0.9 MG/DL (ref 0.2–1.1)
BUN SERPL-MCNC: 23 MG/DL (ref 8–23)
CALCIUM SERPL-MCNC: 11.5 MG/DL (ref 8.3–10.4)
CALCIUM SERPL-MCNC: 11.6 MG/DL (ref 8.3–10.4)
CHLORIDE SERPL-SCNC: 109 MMOL/L (ref 101–110)
CHOLEST SERPL-MCNC: 191 MG/DL
CO2 SERPL-SCNC: 29 MMOL/L (ref 21–32)
CREAT SERPL-MCNC: 0.8 MG/DL (ref 0.6–1)
DIFFERENTIAL METHOD BLD: ABNORMAL
EOSINOPHIL # BLD: 0.1 K/UL (ref 0–0.8)
EOSINOPHIL NFR BLD: 2 % (ref 0.5–7.8)
ERYTHROCYTE [DISTWIDTH] IN BLOOD BY AUTOMATED COUNT: 12.7 % (ref 11.9–14.6)
GLOBULIN SER CALC-MCNC: 3.1 G/DL (ref 2.8–4.5)
GLUCOSE SERPL-MCNC: 93 MG/DL (ref 65–100)
HCT VFR BLD AUTO: 41.1 % (ref 35.8–46.3)
HDLC SERPL-MCNC: 109 MG/DL (ref 40–60)
HDLC SERPL: 1.8
HGB BLD-MCNC: 12.7 G/DL (ref 11.7–15.4)
IMM GRANULOCYTES # BLD AUTO: 0 K/UL (ref 0–0.5)
IMM GRANULOCYTES NFR BLD AUTO: 0 % (ref 0–5)
LDLC SERPL CALC-MCNC: 69.6 MG/DL
LYMPHOCYTES # BLD: 1.1 K/UL (ref 0.5–4.6)
LYMPHOCYTES NFR BLD: 36 % (ref 13–44)
MCH RBC QN AUTO: 32.1 PG (ref 26.1–32.9)
MCHC RBC AUTO-ENTMCNC: 30.9 G/DL (ref 31.4–35)
MCV RBC AUTO: 103.8 FL (ref 82–102)
MONOCYTES # BLD: 0.3 K/UL (ref 0.1–1.3)
MONOCYTES NFR BLD: 10 % (ref 4–12)
NEUTS SEG # BLD: 1.6 K/UL (ref 1.7–8.2)
NEUTS SEG NFR BLD: 51 % (ref 43–78)
NRBC # BLD: 0 K/UL (ref 0–0.2)
PHOSPHATE SERPL-MCNC: 3 MG/DL (ref 2.3–3.7)
PLATELET # BLD AUTO: 196 K/UL (ref 150–450)
PMV BLD AUTO: 11.7 FL (ref 9.4–12.3)
POTASSIUM SERPL-SCNC: 4.3 MMOL/L (ref 3.5–5.1)
PROT SERPL-MCNC: 7 G/DL (ref 6.3–8.2)
PTH-INTACT SERPL-MCNC: 138.7 PG/ML (ref 18.5–88)
RBC # BLD AUTO: 3.96 M/UL (ref 4.05–5.2)
SODIUM SERPL-SCNC: 140 MMOL/L (ref 133–143)
T4 FREE SERPL-MCNC: 1 NG/DL (ref 0.78–1.46)
TRIGL SERPL-MCNC: 62 MG/DL (ref 35–150)
TSH, 3RD GENERATION: 0.71 UIU/ML (ref 0.36–3.74)
VLDLC SERPL CALC-MCNC: 12.4 MG/DL (ref 6–23)
WBC # BLD AUTO: 3.1 K/UL (ref 4.3–11.1)

## 2023-06-15 LAB — CA-I SERPL ISE-MCNC: 6.3 MG/DL (ref 4.5–5.6)

## 2023-06-20 ENCOUNTER — TELEMEDICINE (OUTPATIENT)
Dept: INTERNAL MEDICINE CLINIC | Facility: CLINIC | Age: 88
End: 2023-06-20
Payer: MEDICARE

## 2023-06-20 DIAGNOSIS — N32.89 BLADDER MASS: ICD-10-CM

## 2023-06-20 DIAGNOSIS — I67.9 CEREBROVASCULAR DISEASE: ICD-10-CM

## 2023-06-20 DIAGNOSIS — E05.90 HYPERTHYROIDISM: ICD-10-CM

## 2023-06-20 DIAGNOSIS — E21.3 HYPERPARATHYROIDISM (HCC): ICD-10-CM

## 2023-06-20 DIAGNOSIS — E78.5 DYSLIPIDEMIA: ICD-10-CM

## 2023-06-20 DIAGNOSIS — E05.20 TOXIC MULTINODULAR GOITER: ICD-10-CM

## 2023-06-20 DIAGNOSIS — I10 ESSENTIAL HYPERTENSION, BENIGN: Primary | ICD-10-CM

## 2023-06-20 LAB — PTH RELATED PROT SERPL-SCNC: <2 PMOL/L

## 2023-06-20 PROCEDURE — 99214 OFFICE O/P EST MOD 30 MIN: CPT | Performed by: NURSE PRACTITIONER

## 2023-06-20 PROCEDURE — 1123F ACP DISCUSS/DSCN MKR DOCD: CPT | Performed by: NURSE PRACTITIONER

## 2023-06-20 NOTE — PROGRESS NOTES
Bartolo Ding (:  1934) is a Established patient, presenting virtually for evaluation of the following:    Assessment & Plan   Below is the assessment and plan developed based on review of pertinent history, physical exam, labs, studies, and medications. 1. Essential hypertension, benign  Continue current regimen. Continue monitoring BP at home. 2. Dyslipidemia  Well-controlled on current dose of pravastatin. Continue. 3. Cerebrovascular disease  Continue Plavix and pravastatin. 4. Hyperthyroidism  Monitored by specialist. No acute findings meriting change in the plan. 5. Toxic multinodular goiter    6. Hyperparathyroidism ([facility] [address]  Monitored by specialist. No acute findings meriting change in the plan. 7. Bladder mass  Patient denies any hematuria, dysuria or difficulty urinating. Patient elects not to have surgery and to continue monitoring symptoms at this time. Return in about 3 months (around 2023). Subjective   HPI  Patient seen virtually today accompanied by her  for follow-up on chronic conditions and results review. BP is well-controlled at home per patient report. Followed by cardiology for moderate mitral valve regurg. Repeat echo at next visit (10/24/23). The patient is a 80 y.o. female who is seen for evaluation of  hyperlipidemia. She was tested because history of HLD. Her last labs showed Total cholesterol of 191, , LDL 69,  Triglycerides 62. Cardiac symptoms: none. History of CVA with residual right-sided weakness. Followed by vascular surgery annually to monitor carotid and LE US. Remains on Plavix and pravastatin. Followed by endocrinology in [facility] for hyperthyroidism and hyperparathyroidism. Thyroid ultrasound on 2014 with multinodular goiter with largest solid nodule in left thyroid lobe measuring 5.4 cm in diameter. Markedly enlarged and lobulated thyroid gland noted on CT scan of chest/abdomen/pelvis on 2022.

## 2023-06-24 ASSESSMENT — ENCOUNTER SYMPTOMS
DIARRHEA: 0
ABDOMINAL PAIN: 0
COUGH: 0
VOMITING: 0
SORE THROAT: 0
NAUSEA: 0
SHORTNESS OF BREATH: 0

## 2023-10-06 RX ORDER — PRAVASTATIN SODIUM 10 MG
10 TABLET ORAL DAILY
Qty: 90 TABLET | Refills: 0 | OUTPATIENT
Start: 2023-10-06

## 2023-10-23 ENCOUNTER — TELEPHONE (OUTPATIENT)
Age: 88
End: 2023-10-23

## 2023-10-24 NOTE — TELEPHONE ENCOUNTER
Spoke to pt's daughter about results. She had an appt today but stated pt did not feel well so I r/s her appt.

## 2023-11-08 DIAGNOSIS — I10 ESSENTIAL HYPERTENSION: ICD-10-CM

## 2023-11-09 RX ORDER — IRBESARTAN 300 MG/1
300 TABLET ORAL DAILY
Qty: 90 TABLET | Refills: 1 | OUTPATIENT
Start: 2023-11-09

## 2023-12-04 NOTE — TELEPHONE ENCOUNTER
Pt is requesting a refill of cinacalcet. Please send to [facility] order. Pt is requesting the generic version per their insurance.

## 2023-12-04 NOTE — TELEPHONE ENCOUNTER
Patient is requesting refill on cinacalcet, please. Please send in Dr. Wilberto Krishnamurthy absence.

## 2023-12-05 ENCOUNTER — OFFICE VISIT (OUTPATIENT)
Age: 88
End: 2023-12-05
Payer: MEDICARE

## 2023-12-05 VITALS
SYSTOLIC BLOOD PRESSURE: 152 MMHG | DIASTOLIC BLOOD PRESSURE: 90 MMHG | HEART RATE: 72 BPM | BODY MASS INDEX: 28.32 KG/M2 | WEIGHT: 170 LBS | HEIGHT: 65 IN

## 2023-12-05 DIAGNOSIS — I10 ESSENTIAL HYPERTENSION, BENIGN: Primary | ICD-10-CM

## 2023-12-05 DIAGNOSIS — I34.0 NONRHEUMATIC MITRAL VALVE REGURGITATION: ICD-10-CM

## 2023-12-05 PROCEDURE — 1123F ACP DISCUSS/DSCN MKR DOCD: CPT | Performed by: INTERNAL MEDICINE

## 2023-12-05 PROCEDURE — 99213 OFFICE O/P EST LOW 20 MIN: CPT | Performed by: INTERNAL MEDICINE

## 2023-12-05 ASSESSMENT — ENCOUNTER SYMPTOMS
GASTROINTESTINAL NEGATIVE: 1
ALLERGIC/IMMUNOLOGIC NEGATIVE: 1
EYE PAIN: 0
PHOTOPHOBIA: 0
BACK PAIN: 0
EYES NEGATIVE: 1
SHORTNESS OF BREATH: 0
CHEST TIGHTNESS: 0
RESPIRATORY NEGATIVE: 1
ABDOMINAL PAIN: 0

## 2023-12-05 NOTE — PROGRESS NOTES
Take 1 tablet by mouth daily, Disp: , Rfl:     Travoprost, EMILY Free, (TRAVATAN Z) 0.004 % SOLN ophthalmic solution, Apply 1 drop to eye every evening, Disp: , Rfl:   No Known Allergies  Past Medical History:   Diagnosis Date    Abnormality of gait 5/20/2015    Allergic rhinitis 6/4/2013    Asymptomatic bilateral carotid artery stenosis 10/14/2014    Cerebrovascular disease 5/20/2015    Essential hypertension, benign 6/4/2013    GI bleed     due to ulcer    Glaucoma 5/20/2015    Hx of seasonal allergies     Hyperparathyroidism ([address]) 6/4/2013    Occlusion and stenosis of carotid artery without mention of cerebral infarction 5/20/2015    Osteoporosis, unspecified 6/4/2013    Other and unspecified hyperlipidemia 6/4/2013    Other decreased white blood cell count 6/4/2013    PUD (peptic ulcer disease)     SNHL (sensorineural hearing loss) 6/4/2013    Stroke ([address]) 2006    TIA     Toxic multinodular goiter 6/4/2013    Toxic multinodular goiter without mention of thyrotoxic crisis or storm 6/4/2013    hyperthyroidism     Transient ischemic attack (TIA), and cerebral infarction without residual deficits(V12.54) 6/4/2013    Vitamin D deficiency      Past Surgical History:   Procedure Laterality Date    CATARACT REMOVAL Bilateral     COLONOSCOPY      WISDOM TOOTH EXTRACTION       Family History   Problem Relation Age of Onset    Glaucoma Son     Hypertension Sister     No Known Problems Sister     Thyroid Disease Sister     Hypertension Son     Thyroid Disease Mother         goiter    Diabetes Mother     Drug Abuse Son     Hypertension Daughter     Other Mother         brain aneurysm      Social History     Tobacco Use    Smoking status: Never    Smokeless tobacco: Never   Substance Use Topics    Alcohol use: Yes       ROS:  Review of Systems   Constitutional: Negative. Negative for fever. HENT:  Negative for hearing loss, nosebleeds and tinnitus. Eyes: Negative. Negative for photophobia and pain.    Respiratory:

## 2023-12-06 RX ORDER — CINACALCET 30 MG/1
30 TABLET, FILM COATED ORAL DAILY
Qty: 90 TABLET | OUTPATIENT
Start: 2023-12-06

## 2023-12-07 NOTE — TELEPHONE ENCOUNTER
Called and spoke with patients daughter, she states due to the cost of the medication it is needing to be sent to [facility]. Patients daughter has called her endocrinologist twice now and the doctor sent the prescription twice but checked brand name only. She states she has called the endocrinologist again but is struggling to have the refill sent properly. Advised patients daughter that refill needed to come from her endocrinologist but she requested Dr. Brayan Quiroz get the full message before that decision is made.

## 2023-12-08 NOTE — TELEPHONE ENCOUNTER
Called and advised patients daughter, per Dr. Fontanez Backers, he would have patient's daughter try to contact their office again this week. If still having issues he would be willing to send a one-time prescription with the understanding that going forward her endocrinologist needs to continue to prescribe this medication as it is not a medication he writes regularly. Patients daughter states she emailed patients endocrinologist yesterday and will just wait for their response. She states she will let us know if she needs further assistance.

## 2024-01-01 ENCOUNTER — HOME CARE VISIT (OUTPATIENT)
Dept: HOSPICE | Facility: HOSPICE | Age: 89
End: 2024-01-01
Payer: MEDICARE

## 2024-01-01 ENCOUNTER — HOME CARE VISIT (OUTPATIENT)
Dept: SCHEDULING | Facility: HOME HEALTH | Age: 89
End: 2024-01-01
Payer: MEDICARE

## 2024-01-01 VITALS
DIASTOLIC BLOOD PRESSURE: 70 MMHG | RESPIRATION RATE: 11 BRPM | HEART RATE: 140 BPM | TEMPERATURE: 101.6 F | SYSTOLIC BLOOD PRESSURE: 110 MMHG

## 2024-01-01 VITALS — HEART RATE: 130 BPM | SYSTOLIC BLOOD PRESSURE: 88 MMHG | DIASTOLIC BLOOD PRESSURE: 60 MMHG | RESPIRATION RATE: 9 BRPM

## 2024-01-01 DIAGNOSIS — I10 ESSENTIAL HYPERTENSION: ICD-10-CM

## 2024-01-01 PROCEDURE — G0299 HHS/HOSPICE OF RN EA 15 MIN: HCPCS

## 2024-01-01 PROCEDURE — G0156 HHCP-SVS OF AIDE,EA 15 MIN: HCPCS

## 2024-01-01 RX ORDER — IRBESARTAN 300 MG/1
300 TABLET ORAL DAILY
Qty: 90 TABLET | Refills: 3 | Status: CANCELLED | OUTPATIENT
Start: 2024-01-01

## 2024-01-01 RX ORDER — CLOPIDOGREL BISULFATE 75 MG/1
75 TABLET ORAL DAILY
Qty: 90 TABLET | Refills: 0 | Status: CANCELLED | OUTPATIENT
Start: 2024-01-01

## 2024-01-31 ENCOUNTER — TELEPHONE (OUTPATIENT)
Dept: INTERNAL MEDICINE CLINIC | Facility: CLINIC | Age: 89
End: 2024-01-31

## 2024-01-31 NOTE — TELEPHONE ENCOUNTER
Pt received a my chart message regarding  ibesartan 300mg     She takes it for her high blood pressure    Should she be concerned?

## 2024-02-01 NOTE — TELEPHONE ENCOUNTER
Attempted to call patient, unable to leave voicemail. I do not see a message regarding her medication, I am unsure what she is referring to. According to documentation, the patient should continue her medication as directed by Dr Heredia.

## 2024-03-07 ENCOUNTER — OFFICE VISIT (OUTPATIENT)
Dept: INTERNAL MEDICINE CLINIC | Facility: CLINIC | Age: 89
End: 2024-03-07
Payer: MEDICARE

## 2024-03-07 VITALS
OXYGEN SATURATION: 97 % | BODY MASS INDEX: 23.82 KG/M2 | WEIGHT: 143 LBS | TEMPERATURE: 98.6 F | HEART RATE: 92 BPM | DIASTOLIC BLOOD PRESSURE: 82 MMHG | HEIGHT: 65 IN | SYSTOLIC BLOOD PRESSURE: 122 MMHG

## 2024-03-07 DIAGNOSIS — R05.1 ACUTE COUGH: ICD-10-CM

## 2024-03-07 DIAGNOSIS — U07.1 COVID-19: Primary | ICD-10-CM

## 2024-03-07 LAB
EXP DATE SOLUTION: ABNORMAL
EXP DATE SWAB: ABNORMAL
EXPIRATION DATE: ABNORMAL
INFLUENZA A ANTIGEN, POC: NEGATIVE
INFLUENZA B ANTIGEN, POC: NEGATIVE
LOT NUMBER POC: ABNORMAL
LOT NUMBER SOLUTION: ABNORMAL
LOT NUMBER SWAB: ABNORMAL
RSV RNA, POC: NEGATIVE
SARS-COV-2 RNA, POC: POSITIVE
VALID INTERNAL CONTROL, POC: YES
VALID INTERNAL CONTROL, POC: YES

## 2024-03-07 PROCEDURE — 87804 INFLUENZA ASSAY W/OPTIC: CPT | Performed by: NURSE PRACTITIONER

## 2024-03-07 PROCEDURE — 99213 OFFICE O/P EST LOW 20 MIN: CPT | Performed by: NURSE PRACTITIONER

## 2024-03-07 PROCEDURE — 87635 SARS-COV-2 COVID-19 AMP PRB: CPT | Performed by: NURSE PRACTITIONER

## 2024-03-07 PROCEDURE — 1123F ACP DISCUSS/DSCN MKR DOCD: CPT | Performed by: NURSE PRACTITIONER

## 2024-03-07 PROCEDURE — 87634 RSV DNA/RNA AMP PROBE: CPT | Performed by: NURSE PRACTITIONER

## 2024-03-07 RX ORDER — AZITHROMYCIN 250 MG/1
TABLET, FILM COATED ORAL
Qty: 6 TABLET | Refills: 0 | Status: ON HOLD | OUTPATIENT
Start: 2024-03-07 | End: 2024-03-17

## 2024-03-07 RX ORDER — GUAIFENESIN 600 MG/1
600 TABLET, EXTENDED RELEASE ORAL 2 TIMES DAILY
Qty: 20 TABLET | Refills: 0 | Status: ON HOLD | OUTPATIENT
Start: 2024-03-07 | End: 2024-03-17

## 2024-03-07 RX ORDER — BENZONATATE 200 MG/1
200 CAPSULE ORAL 3 TIMES DAILY PRN
Qty: 20 CAPSULE | Refills: 0 | Status: ON HOLD | OUTPATIENT
Start: 2024-03-07 | End: 2024-03-14

## 2024-03-07 NOTE — PROGRESS NOTES
congestion, postnasal drip, rhinorrhea and sinus pressure. Negative for ear discharge, ear pain and sore throat.    Respiratory:  Positive for cough. Negative for shortness of breath and wheezing.    Cardiovascular:  Negative for chest pain.   Gastrointestinal:  Negative for abdominal pain, diarrhea, nausea and vomiting.   Neurological:  Negative for dizziness and light-headedness.       Objective:  /82 (Site: Right Upper Arm, Position: Sitting, Cuff Size: Small Adult)   Pulse 92   Temp 98.6 °F (37 °C) (Temporal)   Ht 1.651 m (5' 5\")   Wt 64.9 kg (143 lb)   SpO2 97%   BMI 23.80 kg/m²       Examination:  Physical Exam  Vitals and nursing note reviewed.   Constitutional:       General: She is not in acute distress.     Appearance: Normal appearance.   HENT:      Right Ear: Tympanic membrane, ear canal and external ear normal.      Left Ear: Tympanic membrane, ear canal and external ear normal.      Nose: Congestion present.      Mouth/Throat:      Mouth: Mucous membranes are moist.      Pharynx: Oropharynx is clear.   Cardiovascular:      Rate and Rhythm: Normal rate and regular rhythm.   Pulmonary:      Effort: Pulmonary effort is normal.      Breath sounds: Normal breath sounds.   Abdominal:      General: Bowel sounds are normal.      Palpations: Abdomen is soft.   Skin:     General: Skin is warm and dry.   Neurological:      Mental Status: She is alert.           Assessment/Plan:    Lilliam was seen today for cough and fatigue.    Diagnoses and all orders for this visit:    COVID-19  -     azithromycin (ZITHROMAX) 250 MG tablet; 500mg on day 1 followed by 250mg on days 2 - 5  -     guaiFENesin (MUCINEX) 600 MG extended release tablet; Take 1 tablet by mouth 2 times daily for 10 days  -     benzonatate (TESSALON) 200 MG capsule; Take 1 capsule by mouth 3 times daily as needed for Cough  Rapid covid test positive in the office today. Symptoms x 7-10 days at this point. Take medications as prescribed. Call if

## 2024-03-08 ENCOUNTER — TELEPHONE (OUTPATIENT)
Dept: INTERNAL MEDICINE CLINIC | Facility: CLINIC | Age: 89
End: 2024-03-08

## 2024-03-08 ASSESSMENT — ENCOUNTER SYMPTOMS
WHEEZING: 0
NAUSEA: 0
DIARRHEA: 0
COUGH: 1
VOMITING: 0
ABDOMINAL PAIN: 0
RHINORRHEA: 1
SHORTNESS OF BREATH: 0
SINUS PRESSURE: 1
SORE THROAT: 0

## 2024-03-08 NOTE — TELEPHONE ENCOUNTER
----- Message from May Ovi sent at 3/8/2024  9:25 AM EST -----  Subject: Message to Provider    QUESTIONS  Information for Provider? Please call patients daughter (Allie)   regarding patient. She was told to call if any change or trouble with her   mom. She is having a lot of pain and is having difficulty giving her   medication   ---------------------------------------------------------------------------  --------------  CALL BACK INFO  [phone]; OK to leave message on voicemail  ---------------------------------------------------------------------------  --------------  SCRIPT ANSWERS  Relationship to Patient? Other/Third Party  Representative Name? Allie - daughter   Is the representative on the Communication Release of Information (CECILIA)   form in Epic? Yes

## 2024-03-08 NOTE — TELEPHONE ENCOUNTER
Called patient's daughter to clarify what was going on. Patient was diagnosed with COVID yesterday. Patient seems to be more weak today, is not able to get around or do anything by herself. Each time her daughter has tried to give her medicine or make her eat/drink the patient moans in pain and then falls asleep. The patient has had maybe half a cup of water today, but no food at all. The patient ate two spoonfuls of rice and broth and one ensure drink yesterday. Per Dr Heredia, I instructed the patient's daughter to either be very mckeon with the patient to get her to eat/drink or take her to the ER given that if she goes too long without nutrients, she will rapidly decline. Patient's daughter voiced understanding.

## 2024-03-09 ENCOUNTER — HOSPITAL ENCOUNTER (INPATIENT)
Age: 89
LOS: 4 days | Discharge: NURSING, REHAB OR LONG-TERM CARE | DRG: 175 | End: 2024-03-13
Attending: EMERGENCY MEDICINE | Admitting: STUDENT IN AN ORGANIZED HEALTH CARE EDUCATION/TRAINING PROGRAM
Payer: MEDICARE

## 2024-03-09 ENCOUNTER — APPOINTMENT (OUTPATIENT)
Dept: CT IMAGING | Age: 89
DRG: 175 | End: 2024-03-09
Payer: MEDICARE

## 2024-03-09 ENCOUNTER — APPOINTMENT (OUTPATIENT)
Dept: GENERAL RADIOLOGY | Age: 89
DRG: 175 | End: 2024-03-09
Payer: MEDICARE

## 2024-03-09 DIAGNOSIS — R06.02 SHORTNESS OF BREATH: ICD-10-CM

## 2024-03-09 DIAGNOSIS — R53.1 GENERALIZED WEAKNESS: ICD-10-CM

## 2024-03-09 DIAGNOSIS — R53.81 MALAISE AND FATIGUE: ICD-10-CM

## 2024-03-09 DIAGNOSIS — U07.1 COVID-19 VIRUS INFECTION: ICD-10-CM

## 2024-03-09 DIAGNOSIS — I26.99 PULMONARY EMBOLISM ON LEFT (HCC): Primary | ICD-10-CM

## 2024-03-09 DIAGNOSIS — R53.83 MALAISE AND FATIGUE: ICD-10-CM

## 2024-03-09 LAB
ALBUMIN SERPL-MCNC: 3.5 G/DL (ref 3.2–4.6)
ALBUMIN/GLOB SERPL: 0.9 (ref 0.4–1.6)
ALP SERPL-CCNC: 100 U/L (ref 50–136)
ALT SERPL-CCNC: 45 U/L (ref 12–65)
ANION GAP SERPL CALC-SCNC: 2 MMOL/L (ref 2–11)
APTT PPP: 25.5 SEC (ref 23.3–37.4)
AST SERPL-CCNC: 50 U/L (ref 15–37)
BASOPHILS # BLD: 0 K/UL (ref 0–0.2)
BASOPHILS NFR BLD: 0 % (ref 0–2)
BILIRUB SERPL-MCNC: 2.5 MG/DL (ref 0.2–1.1)
BUN SERPL-MCNC: 29 MG/DL (ref 8–23)
CALCIUM SERPL-MCNC: 11.1 MG/DL (ref 8.3–10.4)
CHLORIDE SERPL-SCNC: 111 MMOL/L (ref 103–113)
CO2 SERPL-SCNC: 29 MMOL/L (ref 21–32)
CREAT SERPL-MCNC: 1.1 MG/DL (ref 0.6–1)
D DIMER PPP FEU-MCNC: >20 UG/ML(FEU)
DIFFERENTIAL METHOD BLD: ABNORMAL
EKG ATRIAL RATE: 97 BPM
EKG DIAGNOSIS: NORMAL
EKG P AXIS: 90 DEGREES
EKG P-R INTERVAL: 156 MS
EKG Q-T INTERVAL: 352 MS
EKG QRS DURATION: 112 MS
EKG QTC CALCULATION (BAZETT): 447 MS
EKG R AXIS: -48 DEGREES
EKG T AXIS: 69 DEGREES
EKG VENTRICULAR RATE: 97 BPM
EOSINOPHIL # BLD: 0 K/UL (ref 0–0.8)
EOSINOPHIL NFR BLD: 0 % (ref 0.5–7.8)
ERYTHROCYTE [DISTWIDTH] IN BLOOD BY AUTOMATED COUNT: 12.8 % (ref 11.9–14.6)
FLUAV RNA SPEC QL NAA+PROBE: NOT DETECTED
FLUBV RNA SPEC QL NAA+PROBE: NOT DETECTED
GLOBULIN SER CALC-MCNC: 4.1 G/DL (ref 2.8–4.5)
GLUCOSE SERPL-MCNC: 116 MG/DL (ref 65–100)
HCT VFR BLD AUTO: 39 % (ref 35.8–46.3)
HGB BLD-MCNC: 12.6 G/DL (ref 11.7–15.4)
IMM GRANULOCYTES # BLD AUTO: 0 K/UL (ref 0–0.5)
IMM GRANULOCYTES NFR BLD AUTO: 0 % (ref 0–5)
INR PPP: 1.1
LACTATE SERPL-SCNC: 1.7 MMOL/L (ref 0.4–2)
LACTATE SERPL-SCNC: 1.9 MMOL/L (ref 0.4–2)
LYMPHOCYTES # BLD: 0.8 K/UL (ref 0.5–4.6)
LYMPHOCYTES NFR BLD: 15 % (ref 13–44)
MAGNESIUM SERPL-MCNC: 1.8 MG/DL (ref 1.8–2.4)
MCH RBC QN AUTO: 32.1 PG (ref 26.1–32.9)
MCHC RBC AUTO-ENTMCNC: 32.3 G/DL (ref 31.4–35)
MCV RBC AUTO: 99.5 FL (ref 82–102)
MONOCYTES # BLD: 0.5 K/UL (ref 0.1–1.3)
MONOCYTES NFR BLD: 10 % (ref 4–12)
NEUTS SEG # BLD: 4.2 K/UL (ref 1.7–8.2)
NEUTS SEG NFR BLD: 75 % (ref 43–78)
NRBC # BLD: 0 K/UL (ref 0–0.2)
NT PRO BNP: 773 PG/ML
PLATELET # BLD AUTO: 187 K/UL (ref 150–450)
PMV BLD AUTO: 10.7 FL (ref 9.4–12.3)
POTASSIUM SERPL-SCNC: 3.5 MMOL/L (ref 3.5–5.1)
PROCALCITONIN SERPL-MCNC: <0.05 NG/ML (ref 0–0.49)
PROT SERPL-MCNC: 7.6 G/DL (ref 6.3–8.2)
PROTHROMBIN TIME: 14.5 SEC (ref 11.3–14.9)
RBC # BLD AUTO: 3.92 M/UL (ref 4.05–5.2)
SARS-COV-2 RDRP RESP QL NAA+PROBE: DETECTED
SODIUM SERPL-SCNC: 142 MMOL/L (ref 136–146)
SOURCE: ABNORMAL
UFH PPP CHRO-ACNC: 0.8 IU/ML (ref 0.3–0.7)
UFH PPP CHRO-ACNC: <0.1 IU/ML (ref 0.3–0.7)
WBC # BLD AUTO: 5.6 K/UL (ref 4.3–11.1)

## 2024-03-09 PROCEDURE — 94640 AIRWAY INHALATION TREATMENT: CPT

## 2024-03-09 PROCEDURE — 6360000002 HC RX W HCPCS: Performed by: STUDENT IN AN ORGANIZED HEALTH CARE EDUCATION/TRAINING PROGRAM

## 2024-03-09 PROCEDURE — 1100000000 HC RM PRIVATE

## 2024-03-09 PROCEDURE — 6370000000 HC RX 637 (ALT 250 FOR IP): Performed by: STUDENT IN AN ORGANIZED HEALTH CARE EDUCATION/TRAINING PROGRAM

## 2024-03-09 PROCEDURE — 94760 N-INVAS EAR/PLS OXIMETRY 1: CPT

## 2024-03-09 PROCEDURE — 6360000002 HC RX W HCPCS: Performed by: EMERGENCY MEDICINE

## 2024-03-09 PROCEDURE — 71045 X-RAY EXAM CHEST 1 VIEW: CPT

## 2024-03-09 PROCEDURE — 2700000000 HC OXYGEN THERAPY PER DAY

## 2024-03-09 PROCEDURE — 85025 COMPLETE CBC W/AUTO DIFF WBC: CPT

## 2024-03-09 PROCEDURE — 71260 CT THORAX DX C+: CPT

## 2024-03-09 PROCEDURE — A4216 STERILE WATER/SALINE, 10 ML: HCPCS | Performed by: EMERGENCY MEDICINE

## 2024-03-09 PROCEDURE — 87040 BLOOD CULTURE FOR BACTERIA: CPT

## 2024-03-09 PROCEDURE — 70450 CT HEAD/BRAIN W/O DYE: CPT

## 2024-03-09 PROCEDURE — 6360000004 HC RX CONTRAST MEDICATION: Performed by: EMERGENCY MEDICINE

## 2024-03-09 PROCEDURE — 87635 SARS-COV-2 COVID-19 AMP PRB: CPT

## 2024-03-09 PROCEDURE — 6370000000 HC RX 637 (ALT 250 FOR IP): Performed by: EMERGENCY MEDICINE

## 2024-03-09 PROCEDURE — 93005 ELECTROCARDIOGRAM TRACING: CPT | Performed by: EMERGENCY MEDICINE

## 2024-03-09 PROCEDURE — 2580000003 HC RX 258: Performed by: STUDENT IN AN ORGANIZED HEALTH CARE EDUCATION/TRAINING PROGRAM

## 2024-03-09 PROCEDURE — 96374 THER/PROPH/DIAG INJ IV PUSH: CPT

## 2024-03-09 PROCEDURE — C9113 INJ PANTOPRAZOLE SODIUM, VIA: HCPCS | Performed by: EMERGENCY MEDICINE

## 2024-03-09 PROCEDURE — 2580000003 HC RX 258: Performed by: EMERGENCY MEDICINE

## 2024-03-09 PROCEDURE — 85610 PROTHROMBIN TIME: CPT

## 2024-03-09 PROCEDURE — 93010 ELECTROCARDIOGRAM REPORT: CPT | Performed by: INTERNAL MEDICINE

## 2024-03-09 PROCEDURE — 36415 COLL VENOUS BLD VENIPUNCTURE: CPT

## 2024-03-09 PROCEDURE — 85520 HEPARIN ASSAY: CPT

## 2024-03-09 PROCEDURE — 96375 TX/PRO/DX INJ NEW DRUG ADDON: CPT

## 2024-03-09 PROCEDURE — 99285 EMERGENCY DEPT VISIT HI MDM: CPT

## 2024-03-09 PROCEDURE — 85379 FIBRIN DEGRADATION QUANT: CPT

## 2024-03-09 PROCEDURE — 87502 INFLUENZA DNA AMP PROBE: CPT

## 2024-03-09 PROCEDURE — 83880 ASSAY OF NATRIURETIC PEPTIDE: CPT

## 2024-03-09 PROCEDURE — 84145 PROCALCITONIN (PCT): CPT

## 2024-03-09 PROCEDURE — 80053 COMPREHEN METABOLIC PANEL: CPT

## 2024-03-09 PROCEDURE — 85730 THROMBOPLASTIN TIME PARTIAL: CPT

## 2024-03-09 PROCEDURE — 83605 ASSAY OF LACTIC ACID: CPT

## 2024-03-09 PROCEDURE — 83735 ASSAY OF MAGNESIUM: CPT

## 2024-03-09 PROCEDURE — 1100000003 HC PRIVATE W/ TELEMETRY

## 2024-03-09 RX ORDER — POLYETHYLENE GLYCOL 3350 17 G/17G
17 POWDER, FOR SOLUTION ORAL DAILY PRN
Status: DISCONTINUED | OUTPATIENT
Start: 2024-03-09 | End: 2024-03-13 | Stop reason: HOSPADM

## 2024-03-09 RX ORDER — METHIMAZOLE 5 MG/1
5 TABLET ORAL DAILY
Status: DISCONTINUED | OUTPATIENT
Start: 2024-03-09 | End: 2024-03-09

## 2024-03-09 RX ORDER — VITAMIN B COMPLEX
2000 TABLET ORAL DAILY
Status: DISCONTINUED | OUTPATIENT
Start: 2024-03-09 | End: 2024-03-13 | Stop reason: HOSPADM

## 2024-03-09 RX ORDER — SODIUM CHLORIDE 0.9 % (FLUSH) 0.9 %
5-40 SYRINGE (ML) INJECTION PRN
Status: DISCONTINUED | OUTPATIENT
Start: 2024-03-09 | End: 2024-03-13 | Stop reason: HOSPADM

## 2024-03-09 RX ORDER — ONDANSETRON 4 MG/1
4 TABLET, ORALLY DISINTEGRATING ORAL EVERY 8 HOURS PRN
Status: DISCONTINUED | OUTPATIENT
Start: 2024-03-09 | End: 2024-03-13 | Stop reason: HOSPADM

## 2024-03-09 RX ORDER — LOSARTAN POTASSIUM 50 MG/1
100 TABLET ORAL DAILY
Status: DISCONTINUED | OUTPATIENT
Start: 2024-03-09 | End: 2024-03-13 | Stop reason: HOSPADM

## 2024-03-09 RX ORDER — SODIUM CHLORIDE 9 MG/ML
INJECTION, SOLUTION INTRAVENOUS PRN
Status: DISCONTINUED | OUTPATIENT
Start: 2024-03-09 | End: 2024-03-13 | Stop reason: HOSPADM

## 2024-03-09 RX ORDER — MAGNESIUM SULFATE IN WATER 40 MG/ML
2000 INJECTION, SOLUTION INTRAVENOUS PRN
Status: DISCONTINUED | OUTPATIENT
Start: 2024-03-09 | End: 2024-03-13 | Stop reason: HOSPADM

## 2024-03-09 RX ORDER — DEXAMETHASONE 4 MG/1
6 TABLET ORAL DAILY
Status: DISCONTINUED | OUTPATIENT
Start: 2024-03-09 | End: 2024-03-09

## 2024-03-09 RX ORDER — POTASSIUM CHLORIDE 7.45 MG/ML
10 INJECTION INTRAVENOUS PRN
Status: DISCONTINUED | OUTPATIENT
Start: 2024-03-09 | End: 2024-03-13 | Stop reason: HOSPADM

## 2024-03-09 RX ORDER — ACETAMINOPHEN 325 MG/1
650 TABLET ORAL EVERY 6 HOURS PRN
Status: DISCONTINUED | OUTPATIENT
Start: 2024-03-09 | End: 2024-03-13 | Stop reason: HOSPADM

## 2024-03-09 RX ORDER — IPRATROPIUM BROMIDE AND ALBUTEROL SULFATE 2.5; .5 MG/3ML; MG/3ML
1 SOLUTION RESPIRATORY (INHALATION)
Status: COMPLETED | OUTPATIENT
Start: 2024-03-09 | End: 2024-03-09

## 2024-03-09 RX ORDER — SODIUM CHLORIDE 9 MG/ML
INJECTION, SOLUTION INTRAVENOUS CONTINUOUS
Status: DISCONTINUED | OUTPATIENT
Start: 2024-03-09 | End: 2024-03-11

## 2024-03-09 RX ORDER — PRAVASTATIN SODIUM 20 MG
10 TABLET ORAL NIGHTLY
Status: DISCONTINUED | OUTPATIENT
Start: 2024-03-09 | End: 2024-03-13 | Stop reason: HOSPADM

## 2024-03-09 RX ORDER — ONDANSETRON 2 MG/ML
4 INJECTION INTRAMUSCULAR; INTRAVENOUS
Status: DISCONTINUED | OUTPATIENT
Start: 2024-03-09 | End: 2024-03-09

## 2024-03-09 RX ORDER — HEPARIN SODIUM 1000 [USP'U]/ML
80 INJECTION, SOLUTION INTRAVENOUS; SUBCUTANEOUS PRN
Status: DISCONTINUED | OUTPATIENT
Start: 2024-03-09 | End: 2024-03-11

## 2024-03-09 RX ORDER — SODIUM CHLORIDE 0.9 % (FLUSH) 0.9 %
5-40 SYRINGE (ML) INJECTION EVERY 12 HOURS SCHEDULED
Status: DISCONTINUED | OUTPATIENT
Start: 2024-03-09 | End: 2024-03-13 | Stop reason: HOSPADM

## 2024-03-09 RX ORDER — 0.9 % SODIUM CHLORIDE 0.9 %
1000 INTRAVENOUS SOLUTION INTRAVENOUS ONCE
Status: COMPLETED | OUTPATIENT
Start: 2024-03-09 | End: 2024-03-09

## 2024-03-09 RX ORDER — METHIMAZOLE 5 MG/1
2.5 TABLET ORAL
Status: DISCONTINUED | OUTPATIENT
Start: 2024-03-11 | End: 2024-03-13 | Stop reason: HOSPADM

## 2024-03-09 RX ORDER — HEPARIN SODIUM 10000 [USP'U]/100ML
5-30 INJECTION, SOLUTION INTRAVENOUS CONTINUOUS
Status: DISCONTINUED | OUTPATIENT
Start: 2024-03-09 | End: 2024-03-11

## 2024-03-09 RX ORDER — HEPARIN SODIUM 1000 [USP'U]/ML
80 INJECTION, SOLUTION INTRAVENOUS; SUBCUTANEOUS ONCE
Status: COMPLETED | OUTPATIENT
Start: 2024-03-09 | End: 2024-03-09

## 2024-03-09 RX ORDER — HEPARIN SODIUM 1000 [USP'U]/ML
40 INJECTION, SOLUTION INTRAVENOUS; SUBCUTANEOUS PRN
Status: DISCONTINUED | OUTPATIENT
Start: 2024-03-09 | End: 2024-03-11

## 2024-03-09 RX ORDER — BRIMONIDINE TARTRATE 2 MG/ML
1 SOLUTION/ DROPS OPHTHALMIC 2 TIMES DAILY
Status: DISCONTINUED | OUTPATIENT
Start: 2024-03-09 | End: 2024-03-13 | Stop reason: HOSPADM

## 2024-03-09 RX ORDER — POTASSIUM CHLORIDE 20 MEQ/1
40 TABLET, EXTENDED RELEASE ORAL PRN
Status: DISCONTINUED | OUTPATIENT
Start: 2024-03-09 | End: 2024-03-13 | Stop reason: HOSPADM

## 2024-03-09 RX ORDER — CLOPIDOGREL BISULFATE 75 MG/1
75 TABLET ORAL DAILY
Status: DISCONTINUED | OUTPATIENT
Start: 2024-03-09 | End: 2024-03-13 | Stop reason: HOSPADM

## 2024-03-09 RX ORDER — ONDANSETRON 2 MG/ML
4 INJECTION INTRAMUSCULAR; INTRAVENOUS EVERY 6 HOURS PRN
Status: DISCONTINUED | OUTPATIENT
Start: 2024-03-09 | End: 2024-03-13 | Stop reason: HOSPADM

## 2024-03-09 RX ORDER — GUAIFENESIN/DEXTROMETHORPHAN 100-10MG/5
5 SYRUP ORAL EVERY 4 HOURS PRN
Status: DISCONTINUED | OUTPATIENT
Start: 2024-03-09 | End: 2024-03-13 | Stop reason: HOSPADM

## 2024-03-09 RX ORDER — ACETAMINOPHEN 650 MG/1
650 SUPPOSITORY RECTAL EVERY 6 HOURS PRN
Status: DISCONTINUED | OUTPATIENT
Start: 2024-03-09 | End: 2024-03-13 | Stop reason: HOSPADM

## 2024-03-09 RX ORDER — CINACALCET 30 MG/1
30 TABLET, FILM COATED ORAL DAILY
Status: DISCONTINUED | OUTPATIENT
Start: 2024-03-09 | End: 2024-03-13 | Stop reason: HOSPADM

## 2024-03-09 RX ORDER — FAMOTIDINE 20 MG/1
20 TABLET, FILM COATED ORAL EVERY 24 HOURS
Status: DISCONTINUED | OUTPATIENT
Start: 2024-03-09 | End: 2024-03-13 | Stop reason: HOSPADM

## 2024-03-09 RX ADMIN — CINACALCET HYDROCHLORIDE 30 MG: 30 TABLET, FILM COATED ORAL at 18:21

## 2024-03-09 RX ADMIN — IOPAMIDOL 100 ML: 755 INJECTION, SOLUTION INTRAVENOUS at 14:55

## 2024-03-09 RX ADMIN — CLOPIDOGREL BISULFATE 75 MG: 75 TABLET ORAL at 18:21

## 2024-03-09 RX ADMIN — DEXAMETHASONE 6 MG: 4 TABLET ORAL at 18:21

## 2024-03-09 RX ADMIN — HEPARIN SODIUM 18 UNITS/KG/HR: 10000 INJECTION, SOLUTION INTRAVENOUS at 17:03

## 2024-03-09 RX ADMIN — VITAMIN D, TAB 1000IU (100/BT) 2000 UNITS: 25 TAB at 18:21

## 2024-03-09 RX ADMIN — FAMOTIDINE 20 MG: 20 TABLET, FILM COATED ORAL at 18:21

## 2024-03-09 RX ADMIN — SODIUM CHLORIDE 1000 ML: 9 INJECTION, SOLUTION INTRAVENOUS at 13:40

## 2024-03-09 RX ADMIN — ACETAMINOPHEN 650 MG: 325 TABLET ORAL at 18:21

## 2024-03-09 RX ADMIN — AZITHROMYCIN MONOHYDRATE 500 MG: 500 INJECTION, POWDER, LYOPHILIZED, FOR SOLUTION INTRAVENOUS at 13:41

## 2024-03-09 RX ADMIN — PANTOPRAZOLE SODIUM 40 MG: 40 INJECTION, POWDER, FOR SOLUTION INTRAVENOUS at 16:19

## 2024-03-09 RX ADMIN — BRIMONIDINE TARTRATE 1 DROP: 2 SOLUTION OPHTHALMIC at 20:53

## 2024-03-09 RX ADMIN — SODIUM CHLORIDE, PRESERVATIVE FREE 10 ML: 5 INJECTION INTRAVENOUS at 20:53

## 2024-03-09 RX ADMIN — PRAVASTATIN SODIUM 10 MG: 20 TABLET ORAL at 20:53

## 2024-03-09 RX ADMIN — SODIUM CHLORIDE: 9 INJECTION, SOLUTION INTRAVENOUS at 18:02

## 2024-03-09 RX ADMIN — LOSARTAN POTASSIUM 100 MG: 50 TABLET, FILM COATED ORAL at 18:21

## 2024-03-09 RX ADMIN — IPRATROPIUM BROMIDE AND ALBUTEROL SULFATE 1 DOSE: .5; 3 SOLUTION RESPIRATORY (INHALATION) at 13:36

## 2024-03-09 RX ADMIN — METHIMAZOLE 5 MG: 5 TABLET ORAL at 18:21

## 2024-03-09 RX ADMIN — HEPARIN SODIUM 5190 UNITS: 1000 INJECTION INTRAVENOUS; SUBCUTANEOUS at 17:01

## 2024-03-09 NOTE — ED NOTES
TRANSFER - OUT REPORT:    Verbal report given to Rosalind LOWERY on Lilliam Song  being transferred to 8th floor for routine progression of patient care       Report consisted of patient's Situation, Background, Assessment and   Recommendations(SBAR).     Information from the following report(s) ED SBAR was reviewed with the receiving nurse.    Valyermo Fall Assessment:                           Lines:   Peripheral IV 03/09/24 Left Forearm (Active)   Site Assessment Clean, dry & intact 03/09/24 1246   Phlebitis Assessment No symptoms 03/09/24 1246   Infiltration Assessment 0 03/09/24 1246       Peripheral IV 03/09/24 Left Antecubital (Active)        Opportunity for questions and clarification was provided.      Patient transported with:  O2 @ 2lpm and Registered Nurse          Daly Bejarano RN  03/09/24 9583

## 2024-03-09 NOTE — ED PROVIDER NOTES
Eosinophils Absolute 0.0 0.0 - 0.8 K/UL    Basophils Absolute 0.0 0.0 - 0.2 K/UL    Absolute Immature Granulocyte 0.0 0.0 - 0.5 K/UL   CMP   Result Value Ref Range    Sodium 142 136 - 146 mmol/L    Potassium 3.5 3.5 - 5.1 mmol/L    Chloride 111 103 - 113 mmol/L    CO2 29 21 - 32 mmol/L    Anion Gap 2 2 - 11 mmol/L    Glucose 116 (H) 65 - 100 mg/dL    BUN 29 (H) 8 - 23 MG/DL    Creatinine 1.10 (H) 0.6 - 1.0 MG/DL    Est, Glom Filt Rate 48 (L) >60 ml/min/1.73m2    Calcium 11.1 (H) 8.3 - 10.4 MG/DL    Total Bilirubin 2.5 (H) 0.2 - 1.1 MG/DL    ALT 45 12 - 65 U/L    AST 50 (H) 15 - 37 U/L    Alk Phosphatase 100 50 - 136 U/L    Total Protein 7.6 6.3 - 8.2 g/dL    Albumin 3.5 3.2 - 4.6 g/dL    Globulin 4.1 2.8 - 4.5 g/dL    Albumin/Globulin Ratio 0.9 0.4 - 1.6     Lactate, Sepsis   Result Value Ref Range    Lactic Acid, Sepsis 1.9 0.4 - 2.0 MMOL/L   Procalcitonin   Result Value Ref Range    Procalcitonin <0.05 0.00 - 0.49 ng/mL   Magnesium   Result Value Ref Range    Magnesium 1.8 1.8 - 2.4 mg/dL   D-Dimer, Quantitative   Result Value Ref Range    D-Dimer, Quant >20.00 (H) <0.56 ug/ml(FEU)   EKG 12 Lead (SOB)   Result Value Ref Range    Ventricular Rate 97 BPM    Atrial Rate 97 BPM    P-R Interval 156 ms    QRS Duration 112 ms    Q-T Interval 352 ms    QTc Calculation (Bazett) 447 ms    P Axis 90 degrees    R Axis -48 degrees    T Axis 69 degrees    Diagnosis       Sinus rhythm with Premature atrial complexes  Pulmonary disease pattern  Left anterior fascicular block  Left ventricular hypertrophy ( R in aVL , Aakash product , Romhilt-Chavez )  Abnormal ECG  When compared with ECG of 07-DEC-2021 15:41,  Premature atrial complexes are now Present  Left anterior fascicular block is now Present  Criteria for Septal infarct are no longer Present  Nonspecific T wave abnormality no longer evident in Anterior leads  Confirmed by FIONA MUÑIZ (), IVONE VO ([id number]) on 3/9/2024 12:38:21 PM        CT Head W/O Contrast   Final Result

## 2024-03-09 NOTE — ED TRIAGE NOTES
Pt arrives to the ER with c/o generalized weaknessx 3 days. Pt family states that pt was diagnosed w/covid on Thursday. Since then pt has declined. Pt unable to keep medications down without emesis.  Pt unable to stand or ambulate today. Pt did have guided fall to the ground this morning.

## 2024-03-09 NOTE — ED NOTES
Unable to pull blood from 20g US IV in LAC. Lab phlebotomy line called to obtain the bloodwork     Daly Bejarano RN  03/09/24 1478

## 2024-03-09 NOTE — ED NOTES
Attempted to obtain labs with 2 attempts, since I'm unable to pull blood from her existing IV. US IV RN now at bedside to obtain IV and labs     Daly Bejarano RN  03/09/24 3556

## 2024-03-09 NOTE — H&P
The visualized paranasal sinuses and mastoid air cells are clear     No CT evidence of acute intracranial abnormality. Diffuse periventricular and deep white matter low attenuation with old lacunar infarcts present appear chronic in nature. If there is suspicion for a early ischemic process, further evaluation with an MRI would be beneficial.    XR CHEST PORTABLE    Result Date: 3/9/2024  Chest X-ray INDICATION: Shortness of breath and weakness COMPARISON:  None TECHNIQUE: AP/PA view of the chest was obtained. FINDINGS: The lungs are hyperinflated with chronic changes noted in the infrahilar regions bilaterally. There are no infiltrates or effusions.  The heart size is normal. Atherosclerotic changes are seen at the aortic knob     Chronic changes noted at the lung bases with no radiographic evidence of pneumonic infiltrate or pleural effusion.         Signed:  Angella Orourke MD    Part of this note may have been written by using a voice dictation software.  The note has been proof read but may still contain some grammatical/other typographical errors.

## 2024-03-10 LAB
25(OH)D3 SERPL-MCNC: 42.7 NG/ML (ref 30–100)
ANION GAP SERPL CALC-SCNC: 4 MMOL/L (ref 2–11)
APPEARANCE UR: CLEAR
BACTERIA URNS QL MICRO: NEGATIVE /HPF
BASOPHILS # BLD: 0 K/UL (ref 0–0.2)
BASOPHILS NFR BLD: 0 % (ref 0–2)
BILIRUB UR QL: ABNORMAL
BUN SERPL-MCNC: 23 MG/DL (ref 8–23)
CALCIUM SERPL-MCNC: 10 MG/DL (ref 8.3–10.4)
CASTS URNS QL MICRO: ABNORMAL /LPF
CHLORIDE SERPL-SCNC: 115 MMOL/L (ref 103–113)
CO2 SERPL-SCNC: 27 MMOL/L (ref 21–32)
COLOR UR: ABNORMAL
CREAT SERPL-MCNC: 0.8 MG/DL (ref 0.6–1)
CRP SERPL-MCNC: 4.1 MG/DL (ref 0–0.9)
DIFFERENTIAL METHOD BLD: ABNORMAL
EOSINOPHIL # BLD: 0 K/UL (ref 0–0.8)
EOSINOPHIL NFR BLD: 0 % (ref 0.5–7.8)
EPI CELLS #/AREA URNS HPF: ABNORMAL /HPF
ERYTHROCYTE [DISTWIDTH] IN BLOOD BY AUTOMATED COUNT: 12.7 % (ref 11.9–14.6)
FERRITIN SERPL-MCNC: 1790 NG/ML (ref 8–388)
GLUCOSE SERPL-MCNC: 144 MG/DL (ref 65–100)
GLUCOSE UR STRIP.AUTO-MCNC: NEGATIVE MG/DL
HCT VFR BLD AUTO: 33.8 % (ref 35.8–46.3)
HGB BLD-MCNC: 10.7 G/DL (ref 11.7–15.4)
HGB UR QL STRIP: NEGATIVE
IMM GRANULOCYTES # BLD AUTO: 0 K/UL (ref 0–0.5)
IMM GRANULOCYTES NFR BLD AUTO: 1 % (ref 0–5)
KETONES UR QL STRIP.AUTO: ABNORMAL MG/DL
LEUKOCYTE ESTERASE UR QL STRIP.AUTO: ABNORMAL
LYMPHOCYTES # BLD: 0.7 K/UL (ref 0.5–4.6)
LYMPHOCYTES NFR BLD: 16 % (ref 13–44)
MAGNESIUM SERPL-MCNC: 1.7 MG/DL (ref 1.8–2.4)
MCH RBC QN AUTO: 31.8 PG (ref 26.1–32.9)
MCHC RBC AUTO-ENTMCNC: 31.7 G/DL (ref 31.4–35)
MCV RBC AUTO: 100.6 FL (ref 82–102)
MONOCYTES # BLD: 0.3 K/UL (ref 0.1–1.3)
MONOCYTES NFR BLD: 6 % (ref 4–12)
MUCOUS THREADS URNS QL MICRO: 0 /LPF
NEUTS SEG # BLD: 3.2 K/UL (ref 1.7–8.2)
NEUTS SEG NFR BLD: 77 % (ref 43–78)
NITRITE UR QL STRIP.AUTO: NEGATIVE
NRBC # BLD: 0 K/UL (ref 0–0.2)
PH UR STRIP: 5.5 (ref 5–9)
PLATELET # BLD AUTO: 174 K/UL (ref 150–450)
PMV BLD AUTO: 10.8 FL (ref 9.4–12.3)
POTASSIUM SERPL-SCNC: 3.4 MMOL/L (ref 3.5–5.1)
PROT UR STRIP-MCNC: 100 MG/DL
RBC # BLD AUTO: 3.36 M/UL (ref 4.05–5.2)
RBC #/AREA URNS HPF: ABNORMAL /HPF
SODIUM SERPL-SCNC: 146 MMOL/L (ref 136–146)
SP GR UR REFRACTOMETRY: >1.035 (ref 1–1.02)
UFH PPP CHRO-ACNC: 0.95 IU/ML (ref 0.3–0.7)
UFH PPP CHRO-ACNC: >1.1 IU/ML (ref 0.3–0.7)
URINE CULTURE IF INDICATED: ABNORMAL
UROBILINOGEN UR QL STRIP.AUTO: 1 EU/DL (ref 0.2–1)
WBC # BLD AUTO: 4.2 K/UL (ref 4.3–11.1)
WBC URNS QL MICRO: ABNORMAL /HPF

## 2024-03-10 PROCEDURE — 1100000003 HC PRIVATE W/ TELEMETRY

## 2024-03-10 PROCEDURE — 80048 BASIC METABOLIC PNL TOTAL CA: CPT

## 2024-03-10 PROCEDURE — 83735 ASSAY OF MAGNESIUM: CPT

## 2024-03-10 PROCEDURE — 97161 PT EVAL LOW COMPLEX 20 MIN: CPT

## 2024-03-10 PROCEDURE — 51798 US URINE CAPACITY MEASURE: CPT

## 2024-03-10 PROCEDURE — 86140 C-REACTIVE PROTEIN: CPT

## 2024-03-10 PROCEDURE — 6370000000 HC RX 637 (ALT 250 FOR IP): Performed by: STUDENT IN AN ORGANIZED HEALTH CARE EDUCATION/TRAINING PROGRAM

## 2024-03-10 PROCEDURE — 36415 COLL VENOUS BLD VENIPUNCTURE: CPT

## 2024-03-10 PROCEDURE — 82728 ASSAY OF FERRITIN: CPT

## 2024-03-10 PROCEDURE — 97530 THERAPEUTIC ACTIVITIES: CPT

## 2024-03-10 PROCEDURE — 81001 URINALYSIS AUTO W/SCOPE: CPT

## 2024-03-10 PROCEDURE — 85025 COMPLETE CBC W/AUTO DIFF WBC: CPT

## 2024-03-10 PROCEDURE — 82306 VITAMIN D 25 HYDROXY: CPT

## 2024-03-10 PROCEDURE — 87070 CULTURE OTHR SPECIMN AEROBIC: CPT

## 2024-03-10 PROCEDURE — 6360000002 HC RX W HCPCS: Performed by: EMERGENCY MEDICINE

## 2024-03-10 PROCEDURE — 87205 SMEAR GRAM STAIN: CPT

## 2024-03-10 PROCEDURE — 85520 HEPARIN ASSAY: CPT

## 2024-03-10 PROCEDURE — 2580000003 HC RX 258: Performed by: STUDENT IN AN ORGANIZED HEALTH CARE EDUCATION/TRAINING PROGRAM

## 2024-03-10 PROCEDURE — 6370000000 HC RX 637 (ALT 250 FOR IP): Performed by: INTERNAL MEDICINE

## 2024-03-10 RX ORDER — DORZOLAMIDE HYDROCHLORIDE AND TIMOLOL MALEATE 20; 5 MG/ML; MG/ML
1 SOLUTION/ DROPS OPHTHALMIC 2 TIMES DAILY
Status: DISCONTINUED | OUTPATIENT
Start: 2024-03-10 | End: 2024-03-13 | Stop reason: HOSPADM

## 2024-03-10 RX ORDER — POTASSIUM CHLORIDE 20 MEQ/1
10 TABLET, EXTENDED RELEASE ORAL ONCE
Status: COMPLETED | OUTPATIENT
Start: 2024-03-10 | End: 2024-03-10

## 2024-03-10 RX ORDER — LATANOPROST 50 UG/ML
1 SOLUTION/ DROPS OPHTHALMIC NIGHTLY
Status: DISCONTINUED | OUTPATIENT
Start: 2024-03-10 | End: 2024-03-13 | Stop reason: HOSPADM

## 2024-03-10 RX ADMIN — FAMOTIDINE 20 MG: 20 TABLET, FILM COATED ORAL at 17:33

## 2024-03-10 RX ADMIN — POTASSIUM CHLORIDE 10 MEQ: 1500 TABLET, EXTENDED RELEASE ORAL at 11:11

## 2024-03-10 RX ADMIN — CLOPIDOGREL BISULFATE 75 MG: 75 TABLET ORAL at 08:00

## 2024-03-10 RX ADMIN — BRIMONIDINE TARTRATE 1 DROP: 2 SOLUTION OPHTHALMIC at 21:06

## 2024-03-10 RX ADMIN — PRAVASTATIN SODIUM 10 MG: 20 TABLET ORAL at 21:08

## 2024-03-10 RX ADMIN — BRIMONIDINE TARTRATE 1 DROP: 2 SOLUTION OPHTHALMIC at 08:05

## 2024-03-10 RX ADMIN — VITAMIN D, TAB 1000IU (100/BT) 2000 UNITS: 25 TAB at 08:00

## 2024-03-10 RX ADMIN — SODIUM CHLORIDE: 9 INJECTION, SOLUTION INTRAVENOUS at 08:06

## 2024-03-10 RX ADMIN — DORZOLAMIDE HYDROCHLORIDE AND TIMOLOL MALEATE 1 DROP: 20; 5 SOLUTION/ DROPS OPHTHALMIC at 21:43

## 2024-03-10 RX ADMIN — LOSARTAN POTASSIUM 100 MG: 50 TABLET, FILM COATED ORAL at 08:00

## 2024-03-10 RX ADMIN — HEPARIN SODIUM 12 UNITS/KG/HR: 10000 INJECTION, SOLUTION INTRAVENOUS at 17:36

## 2024-03-10 RX ADMIN — LATANOPROST 1 DROP: 50 SOLUTION OPHTHALMIC at 21:06

## 2024-03-10 RX ADMIN — CINACALCET HYDROCHLORIDE 30 MG: 30 TABLET, FILM COATED ORAL at 08:00

## 2024-03-11 ENCOUNTER — APPOINTMENT (OUTPATIENT)
Dept: ULTRASOUND IMAGING | Age: 89
DRG: 175 | End: 2024-03-11
Payer: MEDICARE

## 2024-03-11 LAB
ANION GAP SERPL CALC-SCNC: 3 MMOL/L (ref 2–11)
ANION GAP SERPL CALC-SCNC: 4 MMOL/L (ref 2–11)
BUN SERPL-MCNC: 14 MG/DL (ref 8–23)
BUN SERPL-MCNC: 19 MG/DL (ref 8–23)
CALCIUM SERPL-MCNC: 9.4 MG/DL (ref 8.3–10.4)
CALCIUM SERPL-MCNC: 9.5 MG/DL (ref 8.3–10.4)
CHLORIDE SERPL-SCNC: 112 MMOL/L (ref 103–113)
CHLORIDE SERPL-SCNC: 116 MMOL/L (ref 103–113)
CO2 SERPL-SCNC: 26 MMOL/L (ref 21–32)
CO2 SERPL-SCNC: 28 MMOL/L (ref 21–32)
CREAT SERPL-MCNC: 0.8 MG/DL (ref 0.6–1)
CREAT SERPL-MCNC: 0.8 MG/DL (ref 0.6–1)
ERYTHROCYTE [DISTWIDTH] IN BLOOD BY AUTOMATED COUNT: 12.7 % (ref 11.9–14.6)
GLUCOSE SERPL-MCNC: 95 MG/DL (ref 65–100)
GLUCOSE SERPL-MCNC: 98 MG/DL (ref 65–100)
HCT VFR BLD AUTO: 33 % (ref 35.8–46.3)
HGB BLD-MCNC: 10.6 G/DL (ref 11.7–15.4)
MAGNESIUM SERPL-MCNC: 1.5 MG/DL (ref 1.8–2.4)
MAGNESIUM SERPL-MCNC: 1.8 MG/DL (ref 1.8–2.4)
MCH RBC QN AUTO: 31.5 PG (ref 26.1–32.9)
MCHC RBC AUTO-ENTMCNC: 32.1 G/DL (ref 31.4–35)
MCV RBC AUTO: 98.2 FL (ref 82–102)
NRBC # BLD: 0 K/UL (ref 0–0.2)
PLATELET # BLD AUTO: 205 K/UL (ref 150–450)
PMV BLD AUTO: 10.8 FL (ref 9.4–12.3)
POTASSIUM SERPL-SCNC: 2.9 MMOL/L (ref 3.5–5.1)
POTASSIUM SERPL-SCNC: 3.1 MMOL/L (ref 3.5–5.1)
RBC # BLD AUTO: 3.36 M/UL (ref 4.05–5.2)
SODIUM SERPL-SCNC: 144 MMOL/L (ref 136–146)
SODIUM SERPL-SCNC: 145 MMOL/L (ref 136–146)
UFH PPP CHRO-ACNC: 0.59 IU/ML (ref 0.3–0.7)
UFH PPP CHRO-ACNC: 0.67 IU/ML (ref 0.3–0.7)
WBC # BLD AUTO: 5.6 K/UL (ref 4.3–11.1)

## 2024-03-11 PROCEDURE — 6360000002 HC RX W HCPCS: Performed by: STUDENT IN AN ORGANIZED HEALTH CARE EDUCATION/TRAINING PROGRAM

## 2024-03-11 PROCEDURE — 85027 COMPLETE CBC AUTOMATED: CPT

## 2024-03-11 PROCEDURE — 2580000003 HC RX 258: Performed by: STUDENT IN AN ORGANIZED HEALTH CARE EDUCATION/TRAINING PROGRAM

## 2024-03-11 PROCEDURE — 1100000003 HC PRIVATE W/ TELEMETRY

## 2024-03-11 PROCEDURE — 85520 HEPARIN ASSAY: CPT

## 2024-03-11 PROCEDURE — 6370000000 HC RX 637 (ALT 250 FOR IP): Performed by: STUDENT IN AN ORGANIZED HEALTH CARE EDUCATION/TRAINING PROGRAM

## 2024-03-11 PROCEDURE — 97165 OT EVAL LOW COMPLEX 30 MIN: CPT

## 2024-03-11 PROCEDURE — 80048 BASIC METABOLIC PNL TOTAL CA: CPT

## 2024-03-11 PROCEDURE — 36415 COLL VENOUS BLD VENIPUNCTURE: CPT

## 2024-03-11 PROCEDURE — 83735 ASSAY OF MAGNESIUM: CPT

## 2024-03-11 PROCEDURE — 97535 SELF CARE MNGMENT TRAINING: CPT

## 2024-03-11 PROCEDURE — 97530 THERAPEUTIC ACTIVITIES: CPT

## 2024-03-11 PROCEDURE — 93970 EXTREMITY STUDY: CPT

## 2024-03-11 RX ORDER — TAMSULOSIN HYDROCHLORIDE 0.4 MG/1
0.4 CAPSULE ORAL DAILY
Status: DISCONTINUED | OUTPATIENT
Start: 2024-03-11 | End: 2024-03-13 | Stop reason: HOSPADM

## 2024-03-11 RX ORDER — POTASSIUM CHLORIDE 20 MEQ/1
40 TABLET, EXTENDED RELEASE ORAL 2 TIMES DAILY
Status: COMPLETED | OUTPATIENT
Start: 2024-03-11 | End: 2024-03-11

## 2024-03-11 RX ORDER — AMLODIPINE BESYLATE 10 MG/1
10 TABLET ORAL DAILY
Status: DISCONTINUED | OUTPATIENT
Start: 2024-03-11 | End: 2024-03-13 | Stop reason: HOSPADM

## 2024-03-11 RX ORDER — POTASSIUM CHLORIDE 20 MEQ/1
40 TABLET, EXTENDED RELEASE ORAL 2 TIMES DAILY
Status: COMPLETED | OUTPATIENT
Start: 2024-03-11 | End: 2024-03-12

## 2024-03-11 RX ADMIN — POTASSIUM CHLORIDE 40 MEQ: 1500 TABLET, EXTENDED RELEASE ORAL at 18:05

## 2024-03-11 RX ADMIN — LATANOPROST 1 DROP: 50 SOLUTION OPHTHALMIC at 20:37

## 2024-03-11 RX ADMIN — BRIMONIDINE TARTRATE 1 DROP: 2 SOLUTION OPHTHALMIC at 09:33

## 2024-03-11 RX ADMIN — CLOPIDOGREL BISULFATE 75 MG: 75 TABLET ORAL at 08:53

## 2024-03-11 RX ADMIN — LOSARTAN POTASSIUM 100 MG: 50 TABLET, FILM COATED ORAL at 08:53

## 2024-03-11 RX ADMIN — METHIMAZOLE 2.5 MG: 5 TABLET ORAL at 08:52

## 2024-03-11 RX ADMIN — POTASSIUM CHLORIDE 40 MEQ: 1500 TABLET, EXTENDED RELEASE ORAL at 12:21

## 2024-03-11 RX ADMIN — TAMSULOSIN HYDROCHLORIDE 0.4 MG: 0.4 CAPSULE ORAL at 12:21

## 2024-03-11 RX ADMIN — CINACALCET HYDROCHLORIDE 30 MG: 30 TABLET, FILM COATED ORAL at 08:53

## 2024-03-11 RX ADMIN — DORZOLAMIDE HYDROCHLORIDE AND TIMOLOL MALEATE 1 DROP: 20; 5 SOLUTION/ DROPS OPHTHALMIC at 20:36

## 2024-03-11 RX ADMIN — DORZOLAMIDE HYDROCHLORIDE AND TIMOLOL MALEATE 1 DROP: 20; 5 SOLUTION/ DROPS OPHTHALMIC at 09:33

## 2024-03-11 RX ADMIN — SODIUM CHLORIDE: 9 INJECTION, SOLUTION INTRAVENOUS at 04:11

## 2024-03-11 RX ADMIN — AMLODIPINE BESYLATE 10 MG: 10 TABLET ORAL at 08:53

## 2024-03-11 RX ADMIN — BRIMONIDINE TARTRATE 1 DROP: 2 SOLUTION OPHTHALMIC at 20:36

## 2024-03-11 RX ADMIN — ACETAMINOPHEN 650 MG: 325 TABLET ORAL at 10:31

## 2024-03-11 RX ADMIN — FAMOTIDINE 20 MG: 20 TABLET, FILM COATED ORAL at 18:05

## 2024-03-11 RX ADMIN — APIXABAN 10 MG: 5 TABLET, FILM COATED ORAL at 20:36

## 2024-03-11 RX ADMIN — PRAVASTATIN SODIUM 10 MG: 20 TABLET ORAL at 20:36

## 2024-03-11 RX ADMIN — APIXABAN 10 MG: 5 TABLET, FILM COATED ORAL at 08:54

## 2024-03-11 RX ADMIN — SODIUM CHLORIDE, PRESERVATIVE FREE 10 ML: 5 INJECTION INTRAVENOUS at 20:36

## 2024-03-11 RX ADMIN — SODIUM CHLORIDE, PRESERVATIVE FREE 10 ML: 5 INJECTION INTRAVENOUS at 09:34

## 2024-03-11 RX ADMIN — POTASSIUM CHLORIDE 40 MEQ: 1500 TABLET, EXTENDED RELEASE ORAL at 09:31

## 2024-03-11 RX ADMIN — VITAMIN D, TAB 1000IU (100/BT) 2000 UNITS: 25 TAB at 08:53

## 2024-03-11 RX ADMIN — MAGNESIUM SULFATE HEPTAHYDRATE 2000 MG: 40 INJECTION, SOLUTION INTRAVENOUS at 09:37

## 2024-03-11 ASSESSMENT — PAIN SCALES - GENERAL
PAINLEVEL_OUTOF10: 4
PAINLEVEL_OUTOF10: 2
PAINLEVEL_OUTOF10: 2
PAINLEVEL_OUTOF10: 1

## 2024-03-11 ASSESSMENT — PAIN DESCRIPTION - ORIENTATION: ORIENTATION: RIGHT;LEFT

## 2024-03-11 ASSESSMENT — PAIN DESCRIPTION - LOCATION: LOCATION: KNEE;FOOT

## 2024-03-11 NOTE — ACP (ADVANCE CARE PLANNING)
Advance Care Planning     General Advance Care Planning (ACP) Conversation    Date of Conversation: 3/9/2024  Conducted with: Patient with Decision Making Capacity    Healthcare Decision Maker:    Primary Decision Maker: Allie Melton - Child - [phone]    Secondary Decision Maker: Niraj Melton - Child - [phone]  Click here to complete Healthcare Decision Makers including selection of the Healthcare Decision Maker Relationship (ie \"Primary\").  Today we documented Decision Maker(s) consistent with Legal Next of Kin hierarchy.    Content/Action Overview:  Has ACP document(s) NOT on file - requested patient to provide  Reviewed DNR/DNI and patient elects Full Code (Attempt Resuscitation)        Length of Voluntary ACP Conversation in minutes:  <16 minutes (Non-Billable)    Rita Barcenas RN

## 2024-03-11 NOTE — DISCHARGE INSTRUCTIONS
Monitoring Weight and Nutrition Status At Home  -Check in with yourself regarding your appetite, and if you are eating more or less than you usually would for more than one week  -If possible, weigh yourself weekly and watch for changes in weight status  -Watch for changes in the way your clothes fit, if they fit baggier or tighter than what is normal for you  -Monitor energy levels, especially if you are feeling weaker or more fatigued than usual    Food and Meal Time Tips  Eat smaller meals and add snacks if you find large portion meals are overwhelming  Keep Ready-to-Eat snacks with you and ready to eat at all times.  -trail mix with nuts, seeds, dried fruits, and pretzels  -peanut butter or cheese crackers  -add protein and milk powder to puddings and yogurts to increase protein and caloric content  -hard boiled eggs  -hummus with vegetables and crackers     Easy to Prepare Foods- Freezer / Fridge / Shelf- Stable   -low sodium canned soups, tuna, salmon,  and beans  -frozen or canned vegetables and fruitsPALM  -dry or fresh pasta and low sodium pasta sauce   -peanut butter or other nut butters  -crackers and breadsticks  -rice, oats, lentils, and barley  -milk powder      Nutrition Supplements  If you are not able to eat enough food or if you have extra calorie requirements, oral supplements can provide you with additional calories, protein, vitamins and minerals.     Recommend Ensure Enlive or a comparable/similar product Twice daily for 30 days unless otherwise directed by your Primary Care Physician.     Where do you find oral Nutrition Supplements?  These supplements are available at most pharmacies, grocery retailers like [employer], [employer], [employer], [employer], [employer], [employer], and many others.    How do I pay for these oral supplements?  1. Use coupons  2. Go online to register for coupons and health tips!   www.ensure.com - click “register”  www.boost/com - click “coupons and

## 2024-03-11 NOTE — THERAPY EVALUATION
ACUTE OCCUPATIONAL THERAPY GOALS:   (Developed with and agreed upon by patient and/or caregiver.)  1. Patient will complete lower body bathing and dressing with CONTACT GUARD ASSIST and adaptive equipment as needed.     2. Patient will complete toilet transfers and toileting with STAND BY ASSIST.  3. Patient will complete grooming ADL tasks standing at sink with STAND BY ASSIST.  4. Patient will tolerate 30 minutes of OT treatment with 1-2 rest breaks to increase activity tolerance for ADLs.   5. Patient will complete functional transfers with STAND BY ASSIST and adaptive equipment as needed.   6. Patient will tolerate 10 minutes BUE exercises to increase strength for safe, functional transfers.     Timeframe: 7 visits        OCCUPATIONAL THERAPY Initial Assessment, Daily Note, and AM       OT Visit Days: 1  Acknowledge Orders  Time  OT Charge Capture  Rehab Caseload Tracker      Lilliam Song is a 89 y.o. female   PRIMARY DIAGNOSIS: Pulmonary embolism on left (HCC)  Shortness of breath [R06.02]  Malaise and fatigue [R53.81, R53.83]  Generalized weakness [R53.1]  Pulmonary embolism on left (HCC) [I26.99]  COVID-19 virus infection [U07.1]       Reason for Referral: Generalized Muscle Weakness (M62.81)  Other lack of cordination (R27.8)  Difficulty in walking, Not elsewhere classified (R26.2)  Other abnormalities of gait and mobility (R26.89)  Inpatient: Payor: [facility] MEDICARE / Plan: AET MEDICARE-ADVANTAGE PPO / Product Type: Medicare /     ASSESSMENT:     REHAB RECOMMENDATIONS:   Recommendation to date pending progress:  Setting:  Short-term Rehab    Equipment:    To Be Determined--pt has a rollator at home     ASSESSMENT:  Ms. Song is a 89 y.o. female who presents to the hospital with generalized weakness. Admitted with pulmonary embolism and C19+. PMHx of HTN and hyperparathyroidism.    Today, pt is received supine in bed with adult daughter at bedside, agreeable to participate in the session. Denies SOB,

## 2024-03-12 ENCOUNTER — APPOINTMENT (OUTPATIENT)
Dept: NON INVASIVE DIAGNOSTICS | Age: 89
DRG: 175 | End: 2024-03-12
Attending: STUDENT IN AN ORGANIZED HEALTH CARE EDUCATION/TRAINING PROGRAM
Payer: MEDICARE

## 2024-03-12 LAB
ANION GAP SERPL CALC-SCNC: 6 MMOL/L (ref 2–11)
BACTERIA SPEC CULT: NORMAL
BASOPHILS # BLD: 0 K/UL (ref 0–0.2)
BASOPHILS NFR BLD: 0 % (ref 0–2)
BUN SERPL-MCNC: 10 MG/DL (ref 8–23)
CALCIUM SERPL-MCNC: 9.6 MG/DL (ref 8.3–10.4)
CHLORIDE SERPL-SCNC: 114 MMOL/L (ref 103–113)
CO2 SERPL-SCNC: 27 MMOL/L (ref 21–32)
CREAT SERPL-MCNC: 0.6 MG/DL (ref 0.6–1)
DIFFERENTIAL METHOD BLD: ABNORMAL
ECHO AO ASC DIAM: 3.3 CM
ECHO AO ROOT DIAM: 3 CM
ECHO AR MAX VEL PISA: 5.1 M/S
ECHO AV AREA PEAK VELOCITY: 2 CM2
ECHO AV AREA VTI: 1.8 CM2
ECHO AV MEAN GRADIENT: 6 MMHG
ECHO AV MEAN VELOCITY: 1.2 M/S
ECHO AV PEAK GRADIENT: 12 MMHG
ECHO AV PEAK VELOCITY: 1.7 M/S
ECHO AV REGURGITANT PHT: 502 MS
ECHO AV VELOCITY RATIO: 0.65
ECHO AV VTI: 33.8 CM
ECHO EST RA PRESSURE: 3 MMHG
ECHO IVC PROX: 1.7 CM
ECHO LA AREA 2C: 17.6 CM2
ECHO LA AREA 4C: 20.6 CM2
ECHO LA DIAMETER: 2.6 CM
ECHO LA MAJOR AXIS: 6.7 CM
ECHO LA MINOR AXIS: 6.3 CM
ECHO LA TO AORTIC ROOT RATIO: 0.87
ECHO LA VOL BP: 46 ML (ref 22–52)
ECHO LA VOL MOD A2C: 39 ML (ref 22–52)
ECHO LA VOL MOD A4C: 52 ML (ref 22–52)
ECHO LV E' LATERAL VELOCITY: 5 CM/S
ECHO LV E' SEPTAL VELOCITY: 5 CM/S
ECHO LV EDV A2C: 64 ML
ECHO LV EDV A4C: 122 ML
ECHO LV EJECTION FRACTION A2C: 60 %
ECHO LV EJECTION FRACTION A4C: 45 %
ECHO LV EJECTION FRACTION BIPLANE: 53 % (ref 55–100)
ECHO LV ESV A2C: 26 ML
ECHO LV ESV A4C: 67 ML
ECHO LV FRACTIONAL SHORTENING: 23 % (ref 28–44)
ECHO LV INTERNAL DIMENSION DIASTOLIC: 3.9 CM (ref 3.9–5.3)
ECHO LV INTERNAL DIMENSION SYSTOLIC: 3 CM
ECHO LV IVSD: 1.2 CM (ref 0.6–0.9)
ECHO LV MASS 2D: 131 G (ref 67–162)
ECHO LV POSTERIOR WALL DIASTOLIC: 0.9 CM (ref 0.6–0.9)
ECHO LV RELATIVE WALL THICKNESS RATIO: 0.46
ECHO LVOT AREA: 3.1 CM2
ECHO LVOT AV VTI INDEX: 0.58
ECHO LVOT DIAM: 2 CM
ECHO LVOT MEAN GRADIENT: 2 MMHG
ECHO LVOT PEAK GRADIENT: 5 MMHG
ECHO LVOT PEAK VELOCITY: 1.1 M/S
ECHO LVOT SV: 61.2 ML
ECHO LVOT VTI: 19.5 CM
ECHO MV A VELOCITY: 1.25 M/S
ECHO MV E DECELERATION TIME (DT): 232 MS
ECHO MV E VELOCITY: 0.61 M/S
ECHO MV E/A RATIO: 0.49
ECHO MV E/E' LATERAL: 12.2
ECHO MV E/E' RATIO (AVERAGED): 12.2
ECHO MV REGURGITANT ALIASING (NYQUIST) VELOCITY: 23 CM/S
ECHO MV REGURGITANT PEAK GRADIENT: 185 MMHG
ECHO MV REGURGITANT PEAK VELOCITY: 6.8 M/S
ECHO MV REGURGITANT RADIUS PISA: 0.4 CM
ECHO MV REGURGITANT VTIA: 243 CM
ECHO PV ACCELERATION TIME (AT): 106 MS
ECHO PV MAX VELOCITY: 0.9 M/S
ECHO PV PEAK GRADIENT: 3 MMHG
ECHO RIGHT VENTRICULAR SYSTOLIC PRESSURE (RVSP): 24 MMHG
ECHO RV BASAL DIMENSION: 3.5 CM
ECHO RV FREE WALL PEAK S': 16 CM/S
ECHO RV INTERNAL DIMENSION: 2.8 CM
ECHO RV TAPSE: 2.8 CM (ref 1.7–?)
ECHO TV REGURGITANT MAX VELOCITY: 2.31 M/S
ECHO TV REGURGITANT PEAK GRADIENT: 21 MMHG
EOSINOPHIL # BLD: 0 K/UL (ref 0–0.8)
EOSINOPHIL NFR BLD: 1 % (ref 0.5–7.8)
ERYTHROCYTE [DISTWIDTH] IN BLOOD BY AUTOMATED COUNT: 12.5 % (ref 11.9–14.6)
GLUCOSE SERPL-MCNC: 97 MG/DL (ref 65–100)
GRAM STN SPEC: NORMAL
HCT VFR BLD AUTO: 33.1 % (ref 35.8–46.3)
HGB BLD-MCNC: 10.5 G/DL (ref 11.7–15.4)
IMM GRANULOCYTES # BLD AUTO: 0.1 K/UL (ref 0–0.5)
IMM GRANULOCYTES NFR BLD AUTO: 2 % (ref 0–5)
LYMPHOCYTES # BLD: 0.9 K/UL (ref 0.5–4.6)
LYMPHOCYTES NFR BLD: 27 % (ref 13–44)
MAGNESIUM SERPL-MCNC: 1.8 MG/DL (ref 1.8–2.4)
MCH RBC QN AUTO: 30.9 PG (ref 26.1–32.9)
MCHC RBC AUTO-ENTMCNC: 31.7 G/DL (ref 31.4–35)
MCV RBC AUTO: 97.4 FL (ref 82–102)
MONOCYTES # BLD: 0.6 K/UL (ref 0.1–1.3)
MONOCYTES NFR BLD: 18 % (ref 4–12)
NEUTS SEG # BLD: 1.9 K/UL (ref 1.7–8.2)
NEUTS SEG NFR BLD: 52 % (ref 43–78)
NRBC # BLD: 0 K/UL (ref 0–0.2)
PHOSPHATE SERPL-MCNC: 0.9 MG/DL (ref 2.3–3.7)
PLATELET # BLD AUTO: 223 K/UL (ref 150–450)
PMV BLD AUTO: 10.9 FL (ref 9.4–12.3)
POTASSIUM SERPL-SCNC: 3.5 MMOL/L (ref 3.5–5.1)
RBC # BLD AUTO: 3.4 M/UL (ref 4.05–5.2)
SERVICE CMNT-IMP: NORMAL
SODIUM SERPL-SCNC: 147 MMOL/L (ref 136–146)
WBC # BLD AUTO: 3.5 K/UL (ref 4.3–11.1)

## 2024-03-12 PROCEDURE — 84100 ASSAY OF PHOSPHORUS: CPT

## 2024-03-12 PROCEDURE — 51798 US URINE CAPACITY MEASURE: CPT

## 2024-03-12 PROCEDURE — 6370000000 HC RX 637 (ALT 250 FOR IP): Performed by: STUDENT IN AN ORGANIZED HEALTH CARE EDUCATION/TRAINING PROGRAM

## 2024-03-12 PROCEDURE — 1100000003 HC PRIVATE W/ TELEMETRY

## 2024-03-12 PROCEDURE — 83735 ASSAY OF MAGNESIUM: CPT

## 2024-03-12 PROCEDURE — 2500000003 HC RX 250 WO HCPCS: Performed by: STUDENT IN AN ORGANIZED HEALTH CARE EDUCATION/TRAINING PROGRAM

## 2024-03-12 PROCEDURE — C8929 TTE W OR WO FOL WCON,DOPPLER: HCPCS

## 2024-03-12 PROCEDURE — 2580000003 HC RX 258: Performed by: STUDENT IN AN ORGANIZED HEALTH CARE EDUCATION/TRAINING PROGRAM

## 2024-03-12 PROCEDURE — 85025 COMPLETE CBC W/AUTO DIFF WBC: CPT

## 2024-03-12 PROCEDURE — 36415 COLL VENOUS BLD VENIPUNCTURE: CPT

## 2024-03-12 PROCEDURE — 80048 BASIC METABOLIC PNL TOTAL CA: CPT

## 2024-03-12 PROCEDURE — 6360000004 HC RX CONTRAST MEDICATION: Performed by: STUDENT IN AN ORGANIZED HEALTH CARE EDUCATION/TRAINING PROGRAM

## 2024-03-12 PROCEDURE — 97530 THERAPEUTIC ACTIVITIES: CPT

## 2024-03-12 RX ORDER — HYDRALAZINE HYDROCHLORIDE 25 MG/1
25 TABLET, FILM COATED ORAL EVERY 8 HOURS SCHEDULED
Status: DISCONTINUED | OUTPATIENT
Start: 2024-03-12 | End: 2024-03-13 | Stop reason: HOSPADM

## 2024-03-12 RX ADMIN — FAMOTIDINE 20 MG: 20 TABLET, FILM COATED ORAL at 17:31

## 2024-03-12 RX ADMIN — SODIUM CHLORIDE, PRESERVATIVE FREE 10 ML: 5 INJECTION INTRAVENOUS at 20:32

## 2024-03-12 RX ADMIN — POTASSIUM CHLORIDE 40 MEQ: 1500 TABLET, EXTENDED RELEASE ORAL at 08:19

## 2024-03-12 RX ADMIN — TAMSULOSIN HYDROCHLORIDE 0.4 MG: 0.4 CAPSULE ORAL at 08:20

## 2024-03-12 RX ADMIN — POTASSIUM PHOSPHATE, MONOBASIC POTASSIUM PHOSPHATE, DIBASIC 30 MMOL: 224; 236 INJECTION, SOLUTION, CONCENTRATE INTRAVENOUS at 08:30

## 2024-03-12 RX ADMIN — BRIMONIDINE TARTRATE 1 DROP: 2 SOLUTION OPHTHALMIC at 20:31

## 2024-03-12 RX ADMIN — CLOPIDOGREL BISULFATE 75 MG: 75 TABLET ORAL at 08:20

## 2024-03-12 RX ADMIN — POTASSIUM & SODIUM PHOSPHATES POWDER PACK 280-160-250 MG 250 MG: 280-160-250 PACK at 15:18

## 2024-03-12 RX ADMIN — VITAMIN D, TAB 1000IU (100/BT) 2000 UNITS: 25 TAB at 08:18

## 2024-03-12 RX ADMIN — PRAVASTATIN SODIUM 10 MG: 20 TABLET ORAL at 20:31

## 2024-03-12 RX ADMIN — DORZOLAMIDE HYDROCHLORIDE AND TIMOLOL MALEATE 1 DROP: 20; 5 SOLUTION/ DROPS OPHTHALMIC at 09:50

## 2024-03-12 RX ADMIN — APIXABAN 10 MG: 5 TABLET, FILM COATED ORAL at 20:31

## 2024-03-12 RX ADMIN — POTASSIUM & SODIUM PHOSPHATES POWDER PACK 280-160-250 MG 250 MG: 280-160-250 PACK at 20:32

## 2024-03-12 RX ADMIN — LOSARTAN POTASSIUM 100 MG: 50 TABLET, FILM COATED ORAL at 08:20

## 2024-03-12 RX ADMIN — GUAIFENESIN SYRUP AND DEXTROMETHORPHAN 5 ML: 100; 10 SYRUP ORAL at 00:45

## 2024-03-12 RX ADMIN — APIXABAN 10 MG: 5 TABLET, FILM COATED ORAL at 08:18

## 2024-03-12 RX ADMIN — POTASSIUM & SODIUM PHOSPHATES POWDER PACK 280-160-250 MG 250 MG: 280-160-250 PACK at 17:31

## 2024-03-12 RX ADMIN — LATANOPROST 1 DROP: 50 SOLUTION OPHTHALMIC at 20:31

## 2024-03-12 RX ADMIN — DORZOLAMIDE HYDROCHLORIDE AND TIMOLOL MALEATE 1 DROP: 20; 5 SOLUTION/ DROPS OPHTHALMIC at 20:31

## 2024-03-12 RX ADMIN — HYDRALAZINE HYDROCHLORIDE 25 MG: 25 TABLET ORAL at 08:19

## 2024-03-12 RX ADMIN — AMLODIPINE BESYLATE 10 MG: 10 TABLET ORAL at 08:20

## 2024-03-12 RX ADMIN — METHIMAZOLE 2.5 MG: 5 TABLET ORAL at 08:19

## 2024-03-12 RX ADMIN — CINACALCET HYDROCHLORIDE 30 MG: 30 TABLET, FILM COATED ORAL at 08:18

## 2024-03-12 RX ADMIN — HYDRALAZINE HYDROCHLORIDE 25 MG: 25 TABLET ORAL at 15:17

## 2024-03-12 RX ADMIN — HYDRALAZINE HYDROCHLORIDE 25 MG: 25 TABLET ORAL at 21:53

## 2024-03-12 RX ADMIN — TUBERCULIN PURIFIED PROTEIN DERIVATIVE 5 UNITS: 5 INJECTION, SOLUTION INTRADERMAL at 17:31

## 2024-03-12 RX ADMIN — SODIUM CHLORIDE, PRESERVATIVE FREE 10 ML: 5 INJECTION INTRAVENOUS at 08:15

## 2024-03-12 RX ADMIN — BRIMONIDINE TARTRATE 1 DROP: 2 SOLUTION OPHTHALMIC at 09:25

## 2024-03-12 RX ADMIN — POTASSIUM & SODIUM PHOSPHATES POWDER PACK 280-160-250 MG 250 MG: 280-160-250 PACK at 08:20

## 2024-03-12 RX ADMIN — SODIUM CHLORIDE, PRESERVATIVE FREE 0.3 ML: 5 INJECTION INTRAVENOUS at 10:09

## 2024-03-12 NOTE — FLOWSHEET NOTE
Received a call from Isabelle in the lab notifying of a critical phosphorus 0.9. Results read back for verification. This RN notified Dr. MAGED Acuna at 0628 via [employer] of critical phosphorus 0.9. Dr. MAGED Acuna informed this RN at 0629 via [employer] that she has ordered replacement protocol. Primary RN, Cecy, notified face-to-face of all of the above stated.        03/12/24 0628   Treatment Team Notification   Reason for Communication Critical results   Type of Critical Result Laboratory   Critical Lab Information Phosphorus 0.9   Person Result Received From Isabelle in the lab   Critical Lab Result Type Electrolytes   Name of Team Member Notified Dr. MAGED Acuna   Treatment Team Role Attending Provider  (on call MD)   Method of Communication Secure Message   Response See orders   Notification Time 0628

## 2024-03-13 ENCOUNTER — TELEPHONE (OUTPATIENT)
Dept: INTERNAL MEDICINE CLINIC | Facility: CLINIC | Age: 89
End: 2024-03-13

## 2024-03-13 VITALS
SYSTOLIC BLOOD PRESSURE: 144 MMHG | BODY MASS INDEX: 23.8 KG/M2 | RESPIRATION RATE: 18 BRPM | HEART RATE: 78 BPM | HEIGHT: 65 IN | TEMPERATURE: 97.3 F | OXYGEN SATURATION: 97 % | DIASTOLIC BLOOD PRESSURE: 66 MMHG

## 2024-03-13 LAB
ANION GAP SERPL CALC-SCNC: 7 MMOL/L (ref 2–11)
BASOPHILS # BLD: 0 K/UL (ref 0–0.2)
BASOPHILS NFR BLD: 0 % (ref 0–2)
BUN SERPL-MCNC: 9 MG/DL (ref 8–23)
CALCIUM SERPL-MCNC: 9.1 MG/DL (ref 8.3–10.4)
CHLORIDE SERPL-SCNC: 112 MMOL/L (ref 103–113)
CO2 SERPL-SCNC: 25 MMOL/L (ref 21–32)
CREAT SERPL-MCNC: 0.5 MG/DL (ref 0.6–1)
DIFFERENTIAL METHOD BLD: ABNORMAL
EOSINOPHIL # BLD: 0 K/UL (ref 0–0.8)
EOSINOPHIL NFR BLD: 1 % (ref 0.5–7.8)
ERYTHROCYTE [DISTWIDTH] IN BLOOD BY AUTOMATED COUNT: 12.6 % (ref 11.9–14.6)
GLUCOSE SERPL-MCNC: 93 MG/DL (ref 65–100)
HCT VFR BLD AUTO: 31.7 % (ref 35.8–46.3)
HGB BLD-MCNC: 10.3 G/DL (ref 11.7–15.4)
IMM GRANULOCYTES # BLD AUTO: 0.1 K/UL (ref 0–0.5)
IMM GRANULOCYTES NFR BLD AUTO: 3 % (ref 0–5)
LYMPHOCYTES # BLD: 1.5 K/UL (ref 0.5–4.6)
LYMPHOCYTES NFR BLD: 36 % (ref 13–44)
MCH RBC QN AUTO: 30.8 PG (ref 26.1–32.9)
MCHC RBC AUTO-ENTMCNC: 32.5 G/DL (ref 31.4–35)
MCV RBC AUTO: 94.9 FL (ref 82–102)
MONOCYTES # BLD: 0.6 K/UL (ref 0.1–1.3)
MONOCYTES NFR BLD: 14 % (ref 4–12)
NEUTS SEG # BLD: 2 K/UL (ref 1.7–8.2)
NEUTS SEG NFR BLD: 46 % (ref 43–78)
NRBC # BLD: 0 K/UL (ref 0–0.2)
PHOSPHATE SERPL-MCNC: 1.8 MG/DL (ref 2.3–3.7)
PLATELET # BLD AUTO: 247 K/UL (ref 150–450)
PMV BLD AUTO: 10.9 FL (ref 9.4–12.3)
POTASSIUM SERPL-SCNC: 3.7 MMOL/L (ref 3.5–5.1)
RBC # BLD AUTO: 3.34 M/UL (ref 4.05–5.2)
SODIUM SERPL-SCNC: 144 MMOL/L (ref 136–146)
WBC # BLD AUTO: 4.2 K/UL (ref 4.3–11.1)

## 2024-03-13 PROCEDURE — 6370000000 HC RX 637 (ALT 250 FOR IP): Performed by: STUDENT IN AN ORGANIZED HEALTH CARE EDUCATION/TRAINING PROGRAM

## 2024-03-13 PROCEDURE — 2580000003 HC RX 258: Performed by: STUDENT IN AN ORGANIZED HEALTH CARE EDUCATION/TRAINING PROGRAM

## 2024-03-13 PROCEDURE — 84100 ASSAY OF PHOSPHORUS: CPT

## 2024-03-13 PROCEDURE — 2500000003 HC RX 250 WO HCPCS: Performed by: STUDENT IN AN ORGANIZED HEALTH CARE EDUCATION/TRAINING PROGRAM

## 2024-03-13 PROCEDURE — 80048 BASIC METABOLIC PNL TOTAL CA: CPT

## 2024-03-13 PROCEDURE — 36415 COLL VENOUS BLD VENIPUNCTURE: CPT

## 2024-03-13 PROCEDURE — 85025 COMPLETE CBC W/AUTO DIFF WBC: CPT

## 2024-03-13 RX ORDER — DOCUSATE SODIUM 100 MG/1
100 CAPSULE, LIQUID FILLED ORAL ONCE
Status: COMPLETED | OUTPATIENT
Start: 2024-03-13 | End: 2024-03-13

## 2024-03-13 RX ORDER — TAMSULOSIN HYDROCHLORIDE 0.4 MG/1
0.4 CAPSULE ORAL DAILY
Qty: 30 CAPSULE | Refills: 0 | Status: SHIPPED | OUTPATIENT
Start: 2024-03-14 | End: 2024-04-13

## 2024-03-13 RX ADMIN — HYDRALAZINE HYDROCHLORIDE 25 MG: 25 TABLET ORAL at 06:17

## 2024-03-13 RX ADMIN — CLOPIDOGREL BISULFATE 75 MG: 75 TABLET ORAL at 09:50

## 2024-03-13 RX ADMIN — AMLODIPINE BESYLATE 10 MG: 10 TABLET ORAL at 09:50

## 2024-03-13 RX ADMIN — POTASSIUM & SODIUM PHOSPHATES POWDER PACK 280-160-250 MG 250 MG: 280-160-250 PACK at 09:51

## 2024-03-13 RX ADMIN — DOCUSATE SODIUM 100 MG: 100 CAPSULE, LIQUID FILLED ORAL at 12:08

## 2024-03-13 RX ADMIN — VITAMIN D, TAB 1000IU (100/BT) 2000 UNITS: 25 TAB at 09:50

## 2024-03-13 RX ADMIN — DORZOLAMIDE HYDROCHLORIDE AND TIMOLOL MALEATE 1 DROP: 20; 5 SOLUTION/ DROPS OPHTHALMIC at 10:23

## 2024-03-13 RX ADMIN — SODIUM CHLORIDE, PRESERVATIVE FREE 10 ML: 5 INJECTION INTRAVENOUS at 10:23

## 2024-03-13 RX ADMIN — LOSARTAN POTASSIUM 100 MG: 50 TABLET, FILM COATED ORAL at 09:50

## 2024-03-13 RX ADMIN — APIXABAN 10 MG: 5 TABLET, FILM COATED ORAL at 09:50

## 2024-03-13 RX ADMIN — BRIMONIDINE TARTRATE 1 DROP: 2 SOLUTION OPHTHALMIC at 10:23

## 2024-03-13 RX ADMIN — TAMSULOSIN HYDROCHLORIDE 0.4 MG: 0.4 CAPSULE ORAL at 09:50

## 2024-03-13 RX ADMIN — POTASSIUM PHOSPHATE, MONOBASIC POTASSIUM PHOSPHATE, DIBASIC 10 MMOL: 224; 236 INJECTION, SOLUTION, CONCENTRATE INTRAVENOUS at 10:02

## 2024-03-13 RX ADMIN — CINACALCET HYDROCHLORIDE 30 MG: 30 TABLET, FILM COATED ORAL at 09:50

## 2024-03-13 ASSESSMENT — PAIN SCALES - GENERAL: PAINLEVEL_OUTOF10: 0

## 2024-03-13 NOTE — DISCHARGE SUMMARY
Hospitalist Discharge Summary   Admit Date:  3/9/2024 12:34 PM   DC Note date: 3/13/2024  Name:  Lilliam Song   Age:  89 y.o.  Sex:  female  :  1934   MRN:  [id number]   Room:  [facility]  PCP:  Yash Heredia DO    Presenting Complaint: Fatigue     Initial Admission Diagnosis: Shortness of breath [R06.02]  Malaise and fatigue [R53.81, R53.83]  Generalized weakness [R53.1]  Pulmonary embolism on left (HCC) [I26.99]  COVID-19 virus infection [U07.1]     Problem List for this Hospitalization (present on admission):    Principal Problem:    Pulmonary embolism on left (HCC)  Active Problems:    Toxic multinodular goiter    Essential hypertension, benign    Hyperparathyroidism (HCC)  Resolved Problems:    * No resolved hospital problems. *      Hospital Course:  89 y.o. female with medical history of Hypertension, hyperparathyroidism who presented with weakness and difficulty mobilizing.  Patient was recently diagnosed with COVID infection and her symptoms had gradually worsened.   In the ED vitals were stable except for elevated blood pressure.  Labs are for creatinine of 1.10, D-dimer of greater than 20.  CT chest was positive for left-sided PE. She was treated with heparin drip.  She was transitioned to Eliquis.  Venous Doppler negative for DVT.  Echo with normal ejection fraction. Pt is saturating well on RA. Her sodium was 147 and was given free water. Her sodium corrected.  PT/OT recommended STR. Pt is hemodynamically stable for discharge.    Disposition: SNF Rehab  Diet: ADULT DIET; Regular  ADULT ORAL NUTRITION SUPPLEMENT; Breakfast, Lunch, Dinner; Standard High Calorie/High Protein Oral Supplement  Code Status: Full Code    Follow Ups:  Follow-up Information       Follow up With Specialties Details Why Contact Info    [facility]   [address] [address] [phone]            Time spent in patient discharge and

## 2024-03-13 NOTE — TELEPHONE ENCOUNTER
Care Transitions Initial Follow Up Call    Outreach made within 2 business days of discharge: Yes    Patient: Lilliam Song Patient : 1934   MRN: [id number]  Reason for Admission: Pulmonary embolism on left  Discharge Date: 21       Spoke with: Patient's daughter    Discharge department/facility: [facility]    TCM Interactive Patient Contact:  Was patient able to fill all prescriptions: Yes  Was patient instructed to bring all medications to the follow-up visit: Yes  Is patient taking all medications as directed in the discharge summary? Yes  Does patient understand their discharge instructions: Yes  Does patient have questions or concerns that need addressed prior to 7-14 day follow up office visit: no    Scheduled appointment with PCP within 7-14 days    Follow Up  Future Appointments   Date Time Provider Department Center   3/15/2024  8:20 AM Yash Heredia DO [facility] AM[facility]   2024  1:00 PM [facility]AS[facility] 1 [facility] AM[facility]   2024  2:30 PM Julianne Martinez APRN - CNP [facility]   2024  9:45 AM [facility] AMB   2024 10:40 AM Yash Heredia DO [facility] AM[facility]       Kaylee Veliz LPN

## 2024-03-13 NOTE — CARE COORDINATION
MSN, CM:  patient to be discharged today to [facility] for rehab services.  Patient and family agree with this discharge plan.  Patient has met all milestones for this admission.  Family at bedside for support.  [facility] to transport patient to facility.       03/13/24 1055   Service Assessment   Patient Orientation Alert and Oriented   Cognition Alert   Services At/After Discharge   Transition of Care Consult (CM Consult) SNF  ([facility])   Partner SNF No   Reason Why Partner SNF Not Chosen Location   Services At/After Discharge Skilled Nursing Facility (SNF)   Mode of [facility] at Discharge [facility]  [facility]   [facility] Transport Time of Discharge 1230   Condition of Participation: Discharge Planning   The Plan for Transition of Care is related to the following treatment goals: Short term rehab   The Patient and/or Patient Representative was provided with a Choice of Provider? Patient;Patient Representative   Name of the Patient Representative who was provided with the Choice of Provider and agrees with the Discharge Plan?  Daughter   The Patient and/Or Patient Representative agree with the Discharge Plan? Yes   Freedom of Choice list was provided with basic dialogue that supports the patient's individualized plan of care/goals, treatment preferences, and shares the quality data associated with the providers?  Yes       
Arrangements Children   Current Services Prior To Admission None   DME Ordered? No   Potential Assistance Purchasing Medications No   Type of Home Care Services None   Patient expects to be discharged to: House   One/Two Story Residence One story   History of falls? 0

## 2024-03-14 ENCOUNTER — CARE COORDINATION (OUTPATIENT)
Dept: CARE COORDINATION | Facility: CLINIC | Age: 89
End: 2024-03-14

## 2024-03-14 LAB
BACTERIA SPEC CULT: NORMAL
BACTERIA SPEC CULT: NORMAL
SERVICE CMNT-IMP: NORMAL
SERVICE CMNT-IMP: NORMAL

## 2024-03-14 NOTE — CARE COORDINATION
Transitions of care outreach postponed at this time.   Patient was discharged to [facility] for STR.   Care Coordinator will monitor for discharge from SNF.

## 2024-03-14 NOTE — PROGRESS NOTES
Hospitalist Progress Note   Admit Date:  3/9/2024 12:34 PM   Name:  Lilliam Song   Age:  89 y.o.  Sex:  female  :  1934   MRN:  [id number]   Room:  [facility]/    Presenting Complaint: Fatigue    Reason(s) for Admission: Shortness of breath [R06.02]  Malaise and fatigue [R53.81, R53.83]  Generalized weakness [R53.1]  Pulmonary embolism on left (HCC) [I26.99]  COVID-19 virus infection [U07.1]     Hospital Course & Interval History:   89 y.o. female with medical history of Hypertension, hyperparathyroidism who presented with weakness and difficulty mobilizing.  Patient was recently diagnosed with COVID infection and her symptoms had gradually worsened.  She denies feeling short of breath on any supplemental oxygen.  In the ED vitals were stable except for elevated blood pressure.  Labs are for creatinine of 1.10, D-dimer of greater than 20.  CT chest was positive for left-sided PE.  .  She was treated with heparin drip.  She was transitioned to Eliquis.  Venous Doppler negative for DVT.  Echo with normal ejection fraction      Subjective/24hr Events (24):  Patient was seen and examined at the bedside.  She was complaining of cough.  Patient denies chest pain, shortness of breath, nausea, vomiting, diarrhea.    ROS:  10 systems reviewed and negative except as noted above.     Assessment & Plan:    Pulmonary embolism on left (HCC) COVID-19 infection  Continue Eliquis    Asymptomatic COVID  Pt is saturating well on RA.   Will hold off on Decadron for now    Hypokalemia  Resolved    Hypomagnesemia  Resolved    Hypophosphatemia  Phosphorus was 0.9   Repleted p.o. and IV  Follow-up with repeat P in a.m.    Hypernatremia  Sodium of 147  Ordered free water boluses    Acute urinary retention  Resolved    Toxic multinodular goiter  On methimazole     Essential hypertension, benign  On losartan 100 mg daily  Added hydralazine 25 Mg every 8hrs and Norvasc 10 Mg daily     Hyperparathyroidism (HCC)  On Sensipar     
       Hospitalist Progress Note   Admit Date:  3/9/2024 12:34 PM   Name:  Lilliam Song   Age:  89 y.o.  Sex:  female  :  1934   MRN:  [id number]   Room:  [facility]    Presenting Complaint: Fatigue    Reason(s) for Admission: Shortness of breath [R06.02]  Malaise and fatigue [R53.81, R53.83]  Generalized weakness [R53.1]  Pulmonary embolism on left (HCC) [I26.99]  COVID-19 virus infection [U07.1]     Hospital Course & Interval History:   89 y.o. female with medical history of Hypertension, hyperparathyroidism who presented with weakness and difficulty mobilizing.  Patient was recently diagnosed with COVID infection and her symptoms had gradually worsened.  She denies feeling short of breath on any supplemental oxygen.  In the ED vitals were stable except for elevated blood pressure.  Labs are for creatinine of 1.10, D-dimer of greater than 20.  CT chest was positive for left-sided PE.    Subjective/24hr Events (03/10/24):  Feels weak, spoke to daughter at bedside    ROS:  10 systems reviewed and negative except as noted above.     Assessment & Plan:    Pulmonary embolism on left (HCC)  COVID-19 infection  CT: Positive for left-sided PE  Started on heparin infusion  Not requiring supplemental oxygen  Will hold off on Decadron for now  Continue to monitor          Toxic multinodular goiter  On methimazole       Essential hypertension, benign  On losartan 100 mg daily       Hyperparathyroidism (HCC)  On Sensipar      Discharge Planning:      PT/OT evals and PPD ordered?  Therapy   Diet: ADULT DIET; Regular  VTE prophylaxis: Heparin  Code status: Full Code        Objective:   Patient Vitals for the past 24 hrs:   Temp Pulse Resp BP SpO2   03/10/24 1046 -- 59 18 132/62 98 %   03/10/24 0756 97.7 °F (36.5 °C) 61 18 (!) 179/83 98 %   03/10/24 0521 98.2 °F (36.8 °C) 62 24 (!) 167/76 96 %   24 2334 98.1 °F (36.7 °C) 63 20 136/64 96 %   24 2230 -- -- -- 133/67 --   24 2046 -- 79 20 -- 96 %   24 
    If you need to see a   -  just ask the nurse to call us.    We look forward to serving your family!!        Chaplain Mena  
  Physician Progress Note      PATIENT:               EFREN DEGROOT  CSN #:                  [id number]  :                       1934  ADMIT DATE:       3/9/2024 12:34 PM  DISCH DATE:        3/13/2024 12:29 PM  RESPONDING  PROVIDER #:        Vivian Ho MD          QUERY TEXT:    Patient admitted with PE. Noted to have moderate malnutrition per dietician   assessment. If possible, please document in progress notes and discharge   summary if you are evaluating and /or treating any of the following:    The medical record reflects the following:  Risk Factors: 89 y.o. female who presented with weakness and difficulty   mobilizing, decreased PO intake  Clinical Indicators: Malnutrition Assessment:  Malnutrition Status: Moderate malnutrition  Context: Acute Illness  Findings of clinical characteristics of malnutrition:  Energy Intake:  Mild decrease in energy intake (Comment) (daughter reports her   intake has been low for 2+ weeks pta)  Weight Loss:  Greater than 7.5% over 3 months  Body Fat Loss:  Mild body fat loss Buccal region  Muscle Mass Loss:  Mild muscle mass loss Temples (temporalis), Clavicles   (pectoralis & deltoids), Hand (interosseous)  Fluid Accumulation:  No significant fluid accumulation   Strength:  Not Performed  Treatment: Continue Current Diet, Start Oral Nutrition Supplement    ASPEN Criteria:    https://aspenjournals.onlinelibrary.ma.com/doi/full/10.1177/634946395539501  5    Thank you,  Yanet Babcock RN, BSN, CDI  [email or web address]  .  Options provided:  -- Protein calorie malnutrition moderate  -- Other - I will add my own diagnosis  -- Disagree - Not applicable / Not valid  -- Disagree - Clinically unable to determine / Unknown  -- Refer to Clinical Documentation Reviewer    PROVIDER RESPONSE TEXT:    This patient has moderate protein calorie malnutrition.    Query created by: Yanet Babcock on 3/12/2024 10:58 AM      Electronically signed by:  Vivian Ho MD 
  Physician Progress Note      PATIENT:               EFREN DEGROOT  CSN #:                  [id number]  :                       1934  ADMIT DATE:       3/9/2024 12:34 PM  DISCH DATE:        3/13/2024 12:29 PM  RESPONDING  PROVIDER #:        Vivian Ho MD          QUERY TEXT:    Pt admitted with PE. Pt noted to have elevated creatinine. If possible, please   document in the progress notes and discharge summary if you are evaluating   and/or treating any of the following:    The medical record reflects the following:  Risk Factors: 89 y.o. female admitted with PE, Covid  Clinical Indicators: Creatinine: 1.10, 0.80, 0.60  Treatment: Serial labs, IVF's    Defined by Kidney Disease Improving Global Outcomes (KDIGO) clinical practice   guideline for acute kidney injury:  -Increase in SCr by greater than or equal to 0.3 mg/dl within 48 hours; or  -Increase or decrease in SCr to greater than or equal to 1.5 times baseline,   which is known or presumed to have occurred within the prior 7 days; or  -Urine volume < 0.5ml/kg/h for 6 hours    Thank you,  Yanet Babcock RN, BSN, CDI  [email or web address]  .  Options provided:  -- Acute kidney failure  -- Acute kidney failure with acute tubular necrosis  -- Acute kidney injury  -- Other - I will add my own diagnosis  -- Disagree - Not applicable / Not valid  -- Disagree - Clinically unable to determine / Unknown  -- Refer to Clinical Documentation Reviewer    PROVIDER RESPONSE TEXT:    No hector    Query created by: Yanet Babcock on 3/12/2024 10:55 AM      Electronically signed by:  Vivian Ho MD 3/14/2024 11:32 AM          
ACUTE PHYSICAL THERAPY GOALS:   (Developed with and agreed upon by patient and/or caregiver.)  Pt will perform bed mobility with Camila in 7 therapy sessions.  Pt will perform sit-to-stand/ stand-to-sit transfers SBA in 7 therapy sessions.  Pt will ambulate 150 ft SBA with use of LRAD/no device and breaks as needed in 7 therapy sessions.  Pt will ambulate up and down 4 steps CGA in 7 therapy sessions.  Pt will perform standing dynamic balance activities with minimal postural sway in 7 therapy sessions.  Pt will tolerate multiple sets and reps of BLE exercises in 7 therapy sessions.  Pt will participate in 1x 23' PT session while maintaining 02 sats above 90% on RA in 7 therapy sessions.    PHYSICAL THERAPY: Daily Note PM   (Link to Caseload Tracking: PT Visit Days : 2  Time In/Out PT Charge Capture  Rehab Caseload Tracker  Orders    Lilliam Song is a 89 y.o. female   PRIMARY DIAGNOSIS: Pulmonary embolism on left (HCC)  Shortness of breath [R06.02]  Malaise and fatigue [R53.81, R53.83]  Generalized weakness [R53.1]  Pulmonary embolism on left (HCC) [I26.99]  COVID-19 virus infection [U07.1]       Inpatient: Payor: [employer] MEDICARE / Plan: AET[employer] MEDICARE-ADVANTAGE PPO / Product Type: Medicare /     ASSESSMENT:     REHAB RECOMMENDATIONS:   Recommendation to date pending progress:  Setting:  Short-term Rehab    Equipment:    To Be Determined     ASSESSMENT:  Ms. Song was found seated in recliner and agreeable to PT. Pt required Jennifer and cuing for hand sequencing when performing a STS to the RW, followed by 2x trials of 15' with a standing rest break at the sink. During ambulation, pt required several verbal cues to RW proximity, especially during 180 degree turning with RW with good carry over during the second bout of ambulation. During today's session, all activity was performed on RA, with O2 sats remaining WNL throughout ambulation. Seated exercises were reviewed at end of session, with cuing for movement 
Continuing to await follow up from Dr. Richi Orourke made aware via perfect serve.   
Heparin gtt restarted per protocol. Heparin infusing at 14 units/kg/hour. Will monitor.   
No urine output for this shift, bladder scan shows 0cc in bladder. Pt assisted up to BSC at daughters request. Will monitor.   
Patient did not void all shift, bladder scanned patient, value of 754 ml. Placed patient on bedside commode and she  voided , 430 ml. Re scanned patient and it showed 324 ml. Messaged MD Trini Acuna, order for straight cath was placed.      Assisted Niru Chinchilla RN. 320 ml was removed. Post void did not show any residual.    Will continue to monitor.  
Patient discharge. Patient left floor with medical transport, transported to [facility].  
Patient lying in bed awake on room air, alert and oriented. Family at the bedside. Patient denies any pain, nausea, or SOB at this time. Instructed patient to call for assistance, call light is within reach. Care ongoing.  
Patient lying in bed, alert and oriented, on room air with patient's daughter at bedside. Patient complains of chronic knee pain but no complaints otherwise. Patient on heparin gtt infusing at 12 units/kg/hour and normal saline infusing at 100mL an hour. Patient indicates no needs at this time. Patient instructed to call for assistance. Call light is within reach. Care ongoing.  
Patient lying in bed, resting on room air. Family at bedside.   
Patient sitting up in chair on 2L NC. Family at bedside. Patient denies any pain, nausea, or SOB at this time. Instructed patient to call for assistance OOB. Call light within reach and chair alarm is armed.  
Patient voided minimal amount throughout shift. Bladder scanned patient and she had 414 ml.       Assist patient to BSC and patient voided 450 ml.     Will continue to monitor.  
Pt resting in bed with daughter at bedside. Pt on RA. No s/sx of distress noted at this time. Will monitor.   
Pt resting in bed with eyes closed, daughter at bedside. Pt awakens when spoken to. Pt oriented times 3 at this time. Pt on 1 L NC, heparin stopped per protocol at this time. Pt denies pain, distress or SOB. Pt encouraged to call for assistance if needed call light in reach, will monitor.   
Repeat bladder scan shows 0 cc.  Pt hasn't voided for this shift.  Dr. Stoddard made aware via perfect serve.   
Report Called to BEVERLEY Aguilar at [facility]. Nurse hand off report given. Answered all questions receiving RN had.   
Reviewed notes 
Spoke with Isrrael Carey MD regarding pt daughter request for an order to straight stick BLE for blood work. No new orders at this time.  
The beginning of Daylight Saving Time occurred at 0200 hrs. Documentation of patient care and medications administered is done with respect to the time change.       
This RN called lab and spoke with Andi regarding timed Anti Xa pending results. Anti Xa ordered for 1930, no results currently at this time. Andi in lab stated \"the order was cancelled by the previous \" and that they \"never received the tube\".     This RN spoke with  on floor previously to verify the anti xa was running and this  had stated \"it shows they received it, its pending\".     Trini Acuna MD made aware of situation. Stat Anti Xa ordered.   
While assisting patient back to bed from bedside commode, found two long oval shaped white pills one with stamp ML 96, and other with stamp . Found long oval shaped pink pill with stamp 44.     Patient's daughter denied any of the pills belonging to her or the patient.     Disposed of medication properly in med waste container.    Will continue to monitor.  
Xa 0.95, heparin decreased by 2 units/kg/hour.  Heparin at 12  units/kg/hour will monitor.   
Evaluation:      Food/Nutrient Intake Outcomes: Food and Nutrient Intake, Supplement Intake  Physical Signs/Symptoms Outcomes: Weight, Meal Time Behavior    Discharge Planning:    Continue Oral Nutrition Supplement    Fatimah Knight RD      
>60 ml/min/1.73m2    Calcium 11.1 (H) 8.3 - 10.4 MG/DL    Total Bilirubin 2.5 (H) 0.2 - 1.1 MG/DL    ALT 45 12 - 65 U/L    AST 50 (H) 15 - 37 U/L    Alk Phosphatase 100 50 - 136 U/L    Total Protein 7.6 6.3 - 8.2 g/dL    Albumin 3.5 3.2 - 4.6 g/dL    Globulin 4.1 2.8 - 4.5 g/dL    Albumin/Globulin Ratio 0.9 0.4 - 1.6     Procalcitonin    Collection Time: 03/09/24 12:50 PM   Result Value Ref Range    Procalcitonin <0.05 0.00 - 0.49 ng/mL   Magnesium    Collection Time: 03/09/24 12:50 PM   Result Value Ref Range    Magnesium 1.8 1.8 - 2.4 mg/dL   D-Dimer, Quantitative    Collection Time: 03/09/24 12:50 PM   Result Value Ref Range    D-Dimer, Quant >20.00 (H) <0.56 ug/ml(FEU)   Lactate, Sepsis    Collection Time: 03/09/24 12:51 PM   Result Value Ref Range    Lactic Acid, Sepsis 1.9 0.4 - 2.0 MMOL/L   Blood Culture 1    Collection Time: 03/09/24 12:51 PM    Specimen: Blood   Result Value Ref Range    Special Requests RIGHT  Antecubital        Culture NO GROWTH 2 DAYS     Brain Natriuretic Peptide    Collection Time: 03/09/24 12:51 PM   Result Value Ref Range    NT Pro- (H) <450 PG/ML   COVID-19, Rapid    Collection Time: 03/09/24  1:38 PM    Specimen: Nasopharyngeal   Result Value Ref Range    Source NASAL      SARS-CoV-2, Rapid Detected (AA) NOTD     Influenza A/B, Molecular    Collection Time: 03/09/24  1:38 PM    Specimen: Nasopharyngeal   Result Value Ref Range    Influenza A, EMI Not detected NOTD      Influenza B, EMI Not detected NOTD     Lactate, Sepsis    Collection Time: 03/09/24  4:51 PM   Result Value Ref Range    Lactic Acid, Sepsis 1.7 0.4 - 2.0 MMOL/L   Blood Culture 2    Collection Time: 03/09/24  4:51 PM    Specimen: Blood   Result Value Ref Range    Special Requests RIGHT  Antecubital        Culture NO GROWTH 2 DAYS     APTT    Collection Time: 03/09/24  4:51 PM   Result Value Ref Range    APTT 25.5 23.3 - 37.4 SEC   Protime-INR    Collection Time: 03/09/24  4:51 PM   Result Value Ref Range    
Mod=Moderate Assistance, Max=Maximal Assistance, Total=Total Assistance, NT=Not Tested    PLAN:   FREQUENCY AND DURATION: 3 times/week for duration of hospital stay or until stated goals are met, whichever comes first.    THERAPY PROGNOSIS: Good    PROBLEM LIST:   (Skilled intervention is medically necessary to address:)  Decreased ADL/Functional Activities  Decreased Activity Tolerance  Decreased AROM/PROM  Decreased Balance  Decreased Gait Ability  Decreased Strength  Decreased Transfer Abilities INTERVENTIONS PLANNED:   (Benefits and precautions of physical therapy have been discussed with the patient.)  Self Care Training  Therapeutic Activity  Therapeutic Exercise/HEP  Neuromuscular Re-education  Gait Training  Education         TREATMENT:   EVALUATION: LOW COMPLEXITY: (Untimed Charge)    TREATMENT:   Therapeutic Activity (23 Minutes): Therapeutic activity included Rolling, Supine to Sit, Sit to Supine, Scooting, Transfer Training, Ambulation on level ground, Sitting balance , and Standing balance to improve functional Activity tolerance, Balance, Coordination, Mobility, Strength, and ROM.    TREATMENT GRID:  N/A    AFTER TREATMENT PRECAUTIONS: Alarm Activated, Bed, Bed/Chair Locked, Call light within reach, Needs within reach, RN notified, and Visitors at bedside    INTERDISCIPLINARY COLLABORATION:  RN/ PCT, PT/ PTA, and OT/ KLINE    EDUCATION: Education Given To: Patient  Education Provided: Role of Therapy    TIME IN/OUT:  Time In: 0916  Time Out: 0944  Minutes: 28    Polo Romo PT

## 2024-03-19 ENCOUNTER — APPOINTMENT (OUTPATIENT)
Dept: CT IMAGING | Age: 89
End: 2024-03-19
Payer: MEDICARE

## 2024-03-19 ENCOUNTER — HOSPITAL ENCOUNTER (EMERGENCY)
Age: 89
Discharge: HOME | End: 2024-03-19
Attending: EMERGENCY MEDICINE
Payer: MEDICARE

## 2024-03-19 ENCOUNTER — TELEPHONE (OUTPATIENT)
Dept: INTERNAL MEDICINE CLINIC | Facility: CLINIC | Age: 89
End: 2024-03-19

## 2024-03-19 ENCOUNTER — APPOINTMENT (OUTPATIENT)
Dept: GENERAL RADIOLOGY | Age: 89
End: 2024-03-19
Payer: MEDICARE

## 2024-03-19 VITALS
BODY MASS INDEX: 25.83 KG/M2 | WEIGHT: 155 LBS | SYSTOLIC BLOOD PRESSURE: 170 MMHG | HEART RATE: 97 BPM | RESPIRATION RATE: 20 BRPM | DIASTOLIC BLOOD PRESSURE: 80 MMHG | TEMPERATURE: 98.3 F | OXYGEN SATURATION: 94 % | HEIGHT: 65 IN

## 2024-03-19 DIAGNOSIS — D64.9 ANEMIA, UNSPECIFIED TYPE: ICD-10-CM

## 2024-03-19 DIAGNOSIS — R07.9 CHEST PAIN, UNSPECIFIED TYPE: Primary | ICD-10-CM

## 2024-03-19 DIAGNOSIS — R11.2 NAUSEA AND VOMITING, UNSPECIFIED VOMITING TYPE: ICD-10-CM

## 2024-03-19 LAB
ALBUMIN SERPL-MCNC: 2.8 G/DL (ref 3.2–4.6)
ALBUMIN/GLOB SERPL: 0.7 (ref 0.4–1.6)
ALP SERPL-CCNC: 70 U/L (ref 50–136)
ALT SERPL-CCNC: 19 U/L (ref 12–65)
ANION GAP SERPL CALC-SCNC: 5 MMOL/L (ref 2–11)
AST SERPL-CCNC: 21 U/L (ref 15–37)
BASOPHILS # BLD: 0 K/UL (ref 0–0.2)
BASOPHILS NFR BLD: 0 % (ref 0–2)
BILIRUB SERPL-MCNC: 2.2 MG/DL (ref 0.2–1.1)
BUN SERPL-MCNC: 17 MG/DL (ref 8–23)
CALCIUM SERPL-MCNC: 10.6 MG/DL (ref 8.3–10.4)
CHLORIDE SERPL-SCNC: 110 MMOL/L (ref 103–113)
CO2 SERPL-SCNC: 26 MMOL/L (ref 21–32)
CREAT SERPL-MCNC: 0.7 MG/DL (ref 0.6–1)
DIFFERENTIAL METHOD BLD: ABNORMAL
EKG ATRIAL RATE: 86 BPM
EKG DIAGNOSIS: NORMAL
EKG P AXIS: 33 DEGREES
EKG P-R INTERVAL: 172 MS
EKG Q-T INTERVAL: 352 MS
EKG QRS DURATION: 120 MS
EKG QTC CALCULATION (BAZETT): 421 MS
EKG R AXIS: -51 DEGREES
EKG T AXIS: -25 DEGREES
EKG VENTRICULAR RATE: 86 BPM
EOSINOPHIL # BLD: 0 K/UL (ref 0–0.8)
EOSINOPHIL NFR BLD: 0 % (ref 0.5–7.8)
ERYTHROCYTE [DISTWIDTH] IN BLOOD BY AUTOMATED COUNT: 13.8 % (ref 11.9–14.6)
FLUAV RNA SPEC QL NAA+PROBE: NOT DETECTED
FLUBV RNA SPEC QL NAA+PROBE: NOT DETECTED
GLOBULIN SER CALC-MCNC: 4.2 G/DL (ref 2.8–4.5)
GLUCOSE SERPL-MCNC: 79 MG/DL (ref 65–100)
HCT VFR BLD AUTO: 30.4 % (ref 35.8–46.3)
HCT VFR BLD AUTO: 31.6 % (ref 35.8–46.3)
HGB BLD-MCNC: 10.1 G/DL (ref 11.7–15.4)
HGB BLD-MCNC: 9.7 G/DL (ref 11.7–15.4)
IMM GRANULOCYTES # BLD AUTO: 0.1 K/UL (ref 0–0.5)
IMM GRANULOCYTES NFR BLD AUTO: 1 % (ref 0–5)
LIPASE SERPL-CCNC: 105 U/L (ref 73–393)
LYMPHOCYTES # BLD: 0.8 K/UL (ref 0.5–4.6)
LYMPHOCYTES NFR BLD: 9 % (ref 13–44)
MCH RBC QN AUTO: 31.7 PG (ref 26.1–32.9)
MCHC RBC AUTO-ENTMCNC: 31.9 G/DL (ref 31.4–35)
MCV RBC AUTO: 99.3 FL (ref 82–102)
MONOCYTES # BLD: 0.8 K/UL (ref 0.1–1.3)
MONOCYTES NFR BLD: 10 % (ref 4–12)
NEUTS SEG # BLD: 6.9 K/UL (ref 1.7–8.2)
NEUTS SEG NFR BLD: 80 % (ref 43–78)
NRBC # BLD: 0 K/UL (ref 0–0.2)
PLATELET # BLD AUTO: 290 K/UL (ref 150–450)
PMV BLD AUTO: 10.2 FL (ref 9.4–12.3)
POTASSIUM SERPL-SCNC: 3.7 MMOL/L (ref 3.5–5.1)
PROT SERPL-MCNC: 7 G/DL (ref 6.3–8.2)
RBC # BLD AUTO: 3.06 M/UL (ref 4.05–5.2)
SARS-COV-2 RDRP RESP QL NAA+PROBE: NOT DETECTED
SODIUM SERPL-SCNC: 141 MMOL/L (ref 136–146)
SOURCE: NORMAL
TROPONIN I SERPL HS-MCNC: 19.8 PG/ML (ref 0–14)
TROPONIN I SERPL HS-MCNC: 20 PG/ML (ref 0–14)
WBC # BLD AUTO: 8.6 K/UL (ref 4.3–11.1)

## 2024-03-19 PROCEDURE — 85018 HEMOGLOBIN: CPT

## 2024-03-19 PROCEDURE — 93005 ELECTROCARDIOGRAM TRACING: CPT | Performed by: EMERGENCY MEDICINE

## 2024-03-19 PROCEDURE — 71260 CT THORAX DX C+: CPT

## 2024-03-19 PROCEDURE — 71045 X-RAY EXAM CHEST 1 VIEW: CPT

## 2024-03-19 PROCEDURE — 93010 ELECTROCARDIOGRAM REPORT: CPT | Performed by: INTERNAL MEDICINE

## 2024-03-19 PROCEDURE — 6360000002 HC RX W HCPCS: Performed by: EMERGENCY MEDICINE

## 2024-03-19 PROCEDURE — 96374 THER/PROPH/DIAG INJ IV PUSH: CPT

## 2024-03-19 PROCEDURE — 84484 ASSAY OF TROPONIN QUANT: CPT

## 2024-03-19 PROCEDURE — 6370000000 HC RX 637 (ALT 250 FOR IP): Performed by: EMERGENCY MEDICINE

## 2024-03-19 PROCEDURE — 87502 INFLUENZA DNA AMP PROBE: CPT

## 2024-03-19 PROCEDURE — 85025 COMPLETE CBC W/AUTO DIFF WBC: CPT

## 2024-03-19 PROCEDURE — 99285 EMERGENCY DEPT VISIT HI MDM: CPT

## 2024-03-19 PROCEDURE — 80053 COMPREHEN METABOLIC PANEL: CPT

## 2024-03-19 PROCEDURE — 87635 SARS-COV-2 COVID-19 AMP PRB: CPT

## 2024-03-19 PROCEDURE — 83690 ASSAY OF LIPASE: CPT

## 2024-03-19 PROCEDURE — 6360000004 HC RX CONTRAST MEDICATION: Performed by: EMERGENCY MEDICINE

## 2024-03-19 PROCEDURE — 85014 HEMATOCRIT: CPT

## 2024-03-19 RX ORDER — ONDANSETRON 2 MG/ML
4 INJECTION INTRAMUSCULAR; INTRAVENOUS
Status: COMPLETED | OUTPATIENT
Start: 2024-03-19 | End: 2024-03-19

## 2024-03-19 RX ORDER — ONDANSETRON 4 MG/1
4 TABLET, FILM COATED ORAL EVERY 8 HOURS PRN
Qty: 12 TABLET | Refills: 0 | Status: SHIPPED | OUTPATIENT
Start: 2024-03-19

## 2024-03-19 RX ADMIN — APIXABAN 5 MG: 5 TABLET, FILM COATED ORAL at 21:37

## 2024-03-19 RX ADMIN — ONDANSETRON 4 MG: 2 INJECTION INTRAMUSCULAR; INTRAVENOUS at 21:41

## 2024-03-19 RX ADMIN — IOPAMIDOL 75 ML: 755 INJECTION, SOLUTION INTRAVENOUS at 18:33

## 2024-03-19 ASSESSMENT — PAIN SCALES - GENERAL
PAINLEVEL_OUTOF10: 0
PAINLEVEL_OUTOF10: 0

## 2024-03-19 ASSESSMENT — LIFESTYLE VARIABLES
HOW MANY STANDARD DRINKS CONTAINING ALCOHOL DO YOU HAVE ON A TYPICAL DAY: PATIENT DOES NOT DRINK
HOW OFTEN DO YOU HAVE A DRINK CONTAINING ALCOHOL: NEVER

## 2024-03-19 ASSESSMENT — PAIN - FUNCTIONAL ASSESSMENT
PAIN_FUNCTIONAL_ASSESSMENT: 0-10
PAIN_FUNCTIONAL_ASSESSMENT: 0-10

## 2024-03-19 NOTE — DISCHARGE INSTRUCTIONS
Schedule close follow-up with primary care physician, cardiology.  Return to ED if symptoms worsen or progress in any way.

## 2024-03-19 NOTE — ED PROVIDER NOTES
1758 CXR IMPRESSION:  Mild patchy airspace disease seen in the lung bases which may be related to the  patient's pulmonary emboli. Pulmonary edema can give this appearance.   [DF]   1926 CT chest    IMPRESSION:  1.  The pulmonary embolus seen in the left pulmonary artery previously is no  longer evident..  2.  Minimal less than 1 cm regions of consolidation in the periphery of the lung  bases and right middle lobe with no evidence for acute pulmonary disease. This  is likely a result of the patient's pulmonary emboli..   [DF]      ED Course User Index  [DF] Arnel Rosa Jr., MD     1 or more acute illnesses that pose a threat to life or bodily function.   1 or more chronic illnesses with a severe exacerbation or progression.  Chronic medical problems impacting care include hypertension, recent diagnosis of PE, COVID-19.  Shared medical decision making was utilized in creating the patients health plan today.    I independently ordered and reviewed each unique test.  I reviewed external records: provider visit note from outside specialist.  I reviewed external records: previous EKG including cardiologist interpretation.    I reviewed external records: previous lab results from outside ED.  I reviewed external records: previous imaging study including radiologist interpretation.   The patients assessment required an independent historian: Patient's daughter provides historical information in regards to past medical history.  States she is concerned that facility has not been giving her mother her Eliquis..  The reason they were needed is important historical information not provided by the patient.  I interpreted the X-rays chest x-ray with mild patchy airspace disease within the lung bases.  Agree with radiologist interpretation..  I interpreted the CT Scan CT chest with no pulmonary believes him evident at this time..  My independent EKG interpretation in the absence of cardiologist: Rate 86.  Sinus

## 2024-03-19 NOTE — ED TRIAGE NOTES
Pt arriving from [facility] via [facility]. Facility called out d/t chest pain after physical therapy. Pt then began to vomit along with coughing. Pt also c/o left hip and arm pain, recently got over covid and bilateral PE    Facility gave 3 SL NTG and stated it did not help    EMS vitals; /80, HR 89 sinus, afebrile, 96% ,

## 2024-03-19 NOTE — TELEPHONE ENCOUNTER
Pt daughter called, states pt was admitted to a facility on 3/14 after 5 day hospital stay. States was in facility for a few days before daughter made facility aware of blood clot. (Pt daughter thought that record of pt medical had been transferred when pt was admitted.)    Also asks what to do about thrombosis going forward.    [facility]

## 2024-03-20 ENCOUNTER — CARE COORDINATION (OUTPATIENT)
Dept: CARE COORDINATION | Facility: CLINIC | Age: 89
End: 2024-03-20

## 2024-03-20 PROBLEM — C67.9 BLADDER CANCER (HCC): Status: ACTIVE | Noted: 2022-04-18

## 2024-03-20 PROBLEM — M23.50 CHRONIC KNEE INSTABILITY: Status: ACTIVE | Noted: 2017-04-30

## 2024-03-20 ASSESSMENT — ENCOUNTER SYMPTOMS
NAUSEA: 1
ABDOMINAL PAIN: 0
SHORTNESS OF BREATH: 1
VOMITING: 1
CONSTIPATION: 0
WHEEZING: 0
SORE THROAT: 0
DIARRHEA: 0

## 2024-03-20 NOTE — CARE COORDINATION
Ambulatory Care Management Outreach Attempt    This patient was received as a referral from  Daily assignment for case management of ed utilizer.    Attempted to reach patient for ED follow up. Pt has respectfully declined services at this time, my contact information has been shared with pt in the event there circumstances change. They are free to reach out at any time. ACM signing off.        Patient: Lilliam Song Patient : 1934 MRN: [id number]    Last Discharge Facility       Date Complaint Diagnosis Description Type Department Provider    3/19/24 Chest Pain Chest pain, unspecified type ... ED (DISCHARGE) Arnel Lantigua Jr., MD                Noted following upcoming appointments from discharge chart review:   [facility] follow up appointment(s):   Future Appointments   Date Time Provider Department Center   3/29/2024  2:00 PM Kristina Javier APRN - CNP [facility]   2024  1:00 PM [facility]TRASOUND 1 [facility]   2024  2:30 PM Julianne Martinez APRN - CNP [facility]   2024  9:45 AM [facility]   2024 10:40 AM Yash Heredia DO [facility]     Non-[facility] follow up appointment(s):

## 2024-03-20 NOTE — CARE COORDINATION
Spoke with daughter Allie this morning who declines to have patient at [facility].  Family accepts HH RN PT OT and referral is made to [facility] for services.

## 2024-03-20 NOTE — ED NOTES
I have reviewed discharge instructions with the patient.  The patient verbalized understanding.    Patient left ED via Discharge Method: stretcher to Home with [facility].    Opportunity for questions and clarification provided.       Patient given 1 scripts.         To continue your aftercare when you leave the hospital, you may receive an automated call from our care team to check in on how you are doing.  This is a free service and part of our promise to provide the best care and service to meet your aftercare needs.” If you have questions, or wish to unsubscribe from this service please call [phone].  Thank you for Choosing our [facility] Emergency Department.       Ha Cordero RN  03/19/24 9026

## 2024-03-21 ENCOUNTER — TELEPHONE (OUTPATIENT)
Dept: INTERNAL MEDICINE CLINIC | Facility: CLINIC | Age: 89
End: 2024-03-21

## 2024-03-21 ENCOUNTER — OFFICE VISIT (OUTPATIENT)
Dept: INTERNAL MEDICINE CLINIC | Facility: CLINIC | Age: 89
End: 2024-03-21
Payer: MEDICARE

## 2024-03-21 VITALS
HEART RATE: 87 BPM | OXYGEN SATURATION: 98 % | DIASTOLIC BLOOD PRESSURE: 64 MMHG | SYSTOLIC BLOOD PRESSURE: 110 MMHG | TEMPERATURE: 97.2 F

## 2024-03-21 DIAGNOSIS — E21.3 HYPERPARATHYROIDISM (HCC): ICD-10-CM

## 2024-03-21 DIAGNOSIS — I65.23 BILATERAL CAROTID ARTERY STENOSIS: ICD-10-CM

## 2024-03-21 DIAGNOSIS — U07.1 COVID-19 VIRUS INFECTION: ICD-10-CM

## 2024-03-21 DIAGNOSIS — M25.561 RIGHT KNEE PAIN, UNSPECIFIED CHRONICITY: ICD-10-CM

## 2024-03-21 DIAGNOSIS — Z86.73 HISTORY OF CVA (CEREBROVASCULAR ACCIDENT): ICD-10-CM

## 2024-03-21 DIAGNOSIS — R53.81 DEBILITY: ICD-10-CM

## 2024-03-21 DIAGNOSIS — D64.9 NORMOCYTIC ANEMIA: ICD-10-CM

## 2024-03-21 DIAGNOSIS — M79.671 PAIN IN BOTH FEET: ICD-10-CM

## 2024-03-21 DIAGNOSIS — I10 ESSENTIAL HYPERTENSION: ICD-10-CM

## 2024-03-21 DIAGNOSIS — E78.5 DYSLIPIDEMIA: ICD-10-CM

## 2024-03-21 DIAGNOSIS — M79.672 PAIN IN BOTH FEET: ICD-10-CM

## 2024-03-21 DIAGNOSIS — Z09 HOSPITAL DISCHARGE FOLLOW-UP: Primary | ICD-10-CM

## 2024-03-21 DIAGNOSIS — Z79.01 CURRENT USE OF LONG TERM ANTICOAGULATION: ICD-10-CM

## 2024-03-21 DIAGNOSIS — I73.9 PERIPHERAL VASCULAR DISEASE (HCC): ICD-10-CM

## 2024-03-21 DIAGNOSIS — I26.99 ACUTE PULMONARY EMBOLISM WITHOUT ACUTE COR PULMONALE, UNSPECIFIED PULMONARY EMBOLISM TYPE (HCC): ICD-10-CM

## 2024-03-21 LAB
ALBUMIN SERPL-MCNC: 3.2 G/DL (ref 3.2–4.6)
ALBUMIN/GLOB SERPL: 0.9 (ref 0.4–1.6)
ALP SERPL-CCNC: 74 U/L (ref 50–136)
ALT SERPL-CCNC: 18 U/L (ref 12–65)
ANION GAP SERPL CALC-SCNC: 4 MMOL/L (ref 2–11)
AST SERPL-CCNC: 21 U/L (ref 15–37)
BASOPHILS # BLD: 0.1 K/UL (ref 0–0.2)
BASOPHILS NFR BLD: 1 % (ref 0–2)
BILIRUB SERPL-MCNC: 2 MG/DL (ref 0.2–1.1)
BUN SERPL-MCNC: 30 MG/DL (ref 8–23)
CALCIUM SERPL-MCNC: 12 MG/DL (ref 8.3–10.4)
CHLORIDE SERPL-SCNC: 110 MMOL/L (ref 103–113)
CO2 SERPL-SCNC: 30 MMOL/L (ref 21–32)
CREAT SERPL-MCNC: 0.8 MG/DL (ref 0.6–1)
DIFFERENTIAL METHOD BLD: ABNORMAL
EOSINOPHIL # BLD: 0.1 K/UL (ref 0–0.8)
EOSINOPHIL NFR BLD: 1 % (ref 0.5–7.8)
ERYTHROCYTE [DISTWIDTH] IN BLOOD BY AUTOMATED COUNT: 14 % (ref 11.9–14.6)
GLOBULIN SER CALC-MCNC: 3.6 G/DL (ref 2.8–4.5)
GLUCOSE SERPL-MCNC: 101 MG/DL (ref 65–100)
HCT VFR BLD AUTO: 32.9 % (ref 35.8–46.3)
HGB BLD-MCNC: 10.2 G/DL (ref 11.7–15.4)
IMM GRANULOCYTES # BLD AUTO: 0.1 K/UL (ref 0–0.5)
IMM GRANULOCYTES NFR BLD AUTO: 1 % (ref 0–5)
LYMPHOCYTES # BLD: 1.1 K/UL (ref 0.5–4.6)
LYMPHOCYTES NFR BLD: 16 % (ref 13–44)
MCH RBC QN AUTO: 31.9 PG (ref 26.1–32.9)
MCHC RBC AUTO-ENTMCNC: 31 G/DL (ref 31.4–35)
MCV RBC AUTO: 102.8 FL (ref 82–102)
MONOCYTES # BLD: 0.8 K/UL (ref 0.1–1.3)
MONOCYTES NFR BLD: 11 % (ref 4–12)
NEUTS SEG # BLD: 5.1 K/UL (ref 1.7–8.2)
NEUTS SEG NFR BLD: 70 % (ref 43–78)
NRBC # BLD: 0 K/UL (ref 0–0.2)
PLATELET # BLD AUTO: 348 K/UL (ref 150–450)
PMV BLD AUTO: 10.7 FL (ref 9.4–12.3)
POTASSIUM SERPL-SCNC: 4.2 MMOL/L (ref 3.5–5.1)
PROT SERPL-MCNC: 6.8 G/DL (ref 6.3–8.2)
RBC # BLD AUTO: 3.2 M/UL (ref 4.05–5.2)
SODIUM SERPL-SCNC: 144 MMOL/L (ref 136–146)
WBC # BLD AUTO: 7.3 K/UL (ref 4.3–11.1)

## 2024-03-21 PROCEDURE — 1111F DSCHRG MED/CURRENT MED MERGE: CPT | Performed by: STUDENT IN AN ORGANIZED HEALTH CARE EDUCATION/TRAINING PROGRAM

## 2024-03-21 PROCEDURE — 99215 OFFICE O/P EST HI 40 MIN: CPT | Performed by: STUDENT IN AN ORGANIZED HEALTH CARE EDUCATION/TRAINING PROGRAM

## 2024-03-21 PROCEDURE — 1123F ACP DISCUSS/DSCN MKR DOCD: CPT | Performed by: STUDENT IN AN ORGANIZED HEALTH CARE EDUCATION/TRAINING PROGRAM

## 2024-03-21 RX ORDER — IRBESARTAN 300 MG/1
300 TABLET ORAL DAILY
Qty: 90 TABLET | Refills: 3 | Status: SHIPPED | OUTPATIENT
Start: 2024-03-21

## 2024-03-21 RX ORDER — TRAMADOL HYDROCHLORIDE 50 MG/1
50 TABLET ORAL 2 TIMES DAILY PRN
Qty: 14 TABLET | Refills: 0 | Status: SHIPPED | OUTPATIENT
Start: 2024-03-21 | End: 2024-03-28

## 2024-03-21 RX ORDER — NALOXONE HYDROCHLORIDE 4 MG/.1ML
SPRAY NASAL
Qty: 2 EACH | Refills: 5 | Status: SHIPPED | OUTPATIENT
Start: 2024-03-21

## 2024-03-21 RX ORDER — CLOPIDOGREL BISULFATE 75 MG/1
75 TABLET ORAL DAILY
Qty: 90 TABLET | Refills: 3 | Status: SHIPPED | OUTPATIENT
Start: 2024-03-21

## 2024-03-21 RX ORDER — PRAVASTATIN SODIUM 10 MG
10 TABLET ORAL DAILY
Qty: 90 TABLET | Refills: 3 | Status: SHIPPED | OUTPATIENT
Start: 2024-03-21

## 2024-03-21 ASSESSMENT — PATIENT HEALTH QUESTIONNAIRE - PHQ9
SUM OF ALL RESPONSES TO PHQ QUESTIONS 1-9: 0
SUM OF ALL RESPONSES TO PHQ9 QUESTIONS 1 & 2: 0
SUM OF ALL RESPONSES TO PHQ QUESTIONS 1-9: 0
1. LITTLE INTEREST OR PLEASURE IN DOING THINGS: NOT AT ALL
2. FEELING DOWN, DEPRESSED OR HOPELESS: NOT AT ALL

## 2024-03-21 ASSESSMENT — ENCOUNTER SYMPTOMS
ABDOMINAL PAIN: 0
WHEEZING: 0
TROUBLE SWALLOWING: 0
ANAL BLEEDING: 0
COUGH: 1
SHORTNESS OF BREATH: 0
BLOOD IN STOOL: 0

## 2024-03-21 NOTE — TELEPHONE ENCOUNTER
Randy cope [facility] called, states pharmacy was sent a Tramadol Rx that is still active at rehab that pt was discharged from. Asks if this script is correct/still active.

## 2024-03-21 NOTE — PROGRESS NOTES
wheezing.    Cardiovascular:  Positive for chest pain.   Gastrointestinal:  Negative for abdominal pain, anal bleeding and blood in stool.   Genitourinary:  Negative for dysuria and hematuria.   Musculoskeletal:  Positive for arthralgias and joint swelling.   Neurological:  Positive for weakness.   Psychiatric/Behavioral:  Negative for confusion.           Objective   Physical Exam  Vitals reviewed.   Constitutional:       General: She is not in acute distress.     Appearance: She is ill-appearing. She is not toxic-appearing or diaphoretic.      Interventions: She is not intubated.     Comments: Pleasant, elderly, frail and lethargic appearing -American female sitting in the wheelchair.  However is able to answer questions and follow commands   HENT:      Head: Normocephalic and atraumatic.      Mouth/Throat:      Mouth: Mucous membranes are moist.   Eyes:      Extraocular Movements: Extraocular movements intact.   Cardiovascular:      Rate and Rhythm: Normal rate and regular rhythm.      Pulses:           Radial pulses are 2+ on the left side.      Heart sounds: No murmur heard.     No friction rub. No gallop.   Pulmonary:      Effort: Pulmonary effort is normal. No tachypnea, accessory muscle usage, respiratory distress or retractions. She is not intubated.      Breath sounds: No wheezing, rhonchi or rales.   Abdominal:      General: Bowel sounds are decreased.      Palpations: Abdomen is soft.      Tenderness: There is no abdominal tenderness. There is no guarding.   Musculoskeletal:      Right lower leg: No edema.      Left lower leg: No edema.      Comments: Right knee wrap/brace palpable underneath pants  Muscle strength 5/5 in bilateral upper extremities, 3/5 in bilateral lower extremities  Decreased passive range of motion of both knees in flexion and extension secondary to pain.  Similar findings in dorsiflexion of both feet   Feet:      Right foot:      Skin integrity: Dry skin present. No ulcer,

## 2024-03-22 ENCOUNTER — PATIENT MESSAGE (OUTPATIENT)
Dept: INTERNAL MEDICINE CLINIC | Facility: CLINIC | Age: 89
End: 2024-03-22

## 2024-03-22 ENCOUNTER — HOME HEALTH ADMISSION (OUTPATIENT)
Dept: HOME HEALTH SERVICES | Facility: HOME HEALTH | Age: 89
End: 2024-03-22
Payer: MEDICARE

## 2024-03-22 ENCOUNTER — TELEPHONE (OUTPATIENT)
Dept: INTERNAL MEDICINE CLINIC | Facility: CLINIC | Age: 89
End: 2024-03-22

## 2024-03-22 DIAGNOSIS — D64.9 NORMOCYTIC ANEMIA: ICD-10-CM

## 2024-03-22 LAB — LDH SERPL L TO P-CCNC: 267 U/L (ref 110–210)

## 2024-03-22 RX ORDER — FUROSEMIDE 20 MG/1
20 TABLET ORAL DAILY
Qty: 3 TABLET | Refills: 0 | Status: SHIPPED | OUTPATIENT
Start: 2024-03-22

## 2024-03-22 NOTE — TELEPHONE ENCOUNTER
----- Message from Yash Heredia DO sent at 3/22/2024  1:35 PM EDT -----  Regarding: RE: Tramadol  Contact: [phone]  Patient can take 50 mg twice a day as needed.  Do not drink alcohol, drive or operate heavy machinery after use as it may cause drowsiness.  Please make sure they still  the prescription for Narcan that I sent in.  In the acute setting for high calcium I will usually try and give a couple days of a diuretic like Lasix however this is only a short-term fix and ultimately needs to be addressed by her endocrinologist because it is due to her hyperparathyroidism.    Yash   ----- Message -----  From: Lizzette Hagen MA  Sent: 3/22/2024   1:02 PM EDT  To: Yash Heredia DO  Subject: FW: Tramadol                                       ----- Message -----  From: Lilliam Song  Sent: 3/22/2024  11:49 AM EDT  To: #  Subject: Tramadol                                         How does Dr. Heredia want mom to take the tramadol? The rehab facility is going to release the medication to me versus reversing the billing. Allie LEE What is your plan to minimize her calcium levels?I put a call into her endocrinologist but I haven't heard from anybody.

## 2024-03-22 NOTE — TELEPHONE ENCOUNTER
Spoke to pharmacist at [facility]. Was told that according to PDMP the facility filled the patient's Tramadol because they were under the impression she is coming back and will be given the medication when she comes back. I spoke to the patient's daughter and confirmed they are not going back to the rehab due to the care they received. Pharmacist asked me to contact the rehab and have them reverse the medication refill and discard it before they will filled the prescription at [facility]

## 2024-03-22 NOTE — TELEPHONE ENCOUNTER
Aline from [facility] is calling to request notes that state a cane or walker is not sufficient for the patient in order to complete the order for a wheelchair.     Fax number [phone]

## 2024-03-23 LAB
CA-I SERPL ISE-MCNC: 6.3 MG/DL (ref 4.5–5.6)
HAPTOGLOB SERPL-MCNC: 315 MG/DL (ref 30–200)

## 2024-03-25 ENCOUNTER — TELEPHONE (OUTPATIENT)
Dept: INTERNAL MEDICINE CLINIC | Facility: CLINIC | Age: 89
End: 2024-03-25

## 2024-03-25 ENCOUNTER — HOME CARE VISIT (OUTPATIENT)
Dept: SCHEDULING | Facility: HOME HEALTH | Age: 89
End: 2024-03-25
Payer: MEDICARE

## 2024-03-25 VITALS
HEART RATE: 98 BPM | SYSTOLIC BLOOD PRESSURE: 134 MMHG | OXYGEN SATURATION: 92 % | TEMPERATURE: 98.1 F | DIASTOLIC BLOOD PRESSURE: 70 MMHG | RESPIRATION RATE: 18 BRPM

## 2024-03-25 DIAGNOSIS — M79.671 PAIN IN BOTH FEET: ICD-10-CM

## 2024-03-25 DIAGNOSIS — M25.561 RIGHT KNEE PAIN, UNSPECIFIED CHRONICITY: Primary | ICD-10-CM

## 2024-03-25 DIAGNOSIS — M79.672 PAIN IN BOTH FEET: ICD-10-CM

## 2024-03-25 PROCEDURE — G0299 HHS/HOSPICE OF RN EA 15 MIN: HCPCS

## 2024-03-25 ASSESSMENT — ENCOUNTER SYMPTOMS
COUGH: 1
COUGH CHARACTERISTICS: NON-PRODUCTIVE
PAIN LOCATION - PAIN QUALITY: ACHE
CONSTIPATION: 1

## 2024-03-25 NOTE — TELEPHONE ENCOUNTER
Verbal orders given for home health, nutrition and speech therapy. Interaction between Eliquis and Voltaren Gel - risk for GI bleed. Using Voltaren as needed for knee pain. HH nurse also reports that patient has not had a bowel movement in 5-6 days. Patient has been drinking Ensures, but has not ate much.

## 2024-03-25 NOTE — TELEPHONE ENCOUNTER
Patient still with knee pain and bilateral feet pain, still significant.  Offered referral to orthopedic specialist for which daughter is in agreement.  Referral order placed.    Orders Placed This Encounter    [facility]     Referral Priority:   Routine     Referral Type:   Eval and Treat     Referral Reason:   Specialty Services Required     Requested Specialty:   Orthopedic Surgery     Number of Visits Requested:   1

## 2024-03-25 NOTE — TELEPHONE ENCOUNTER
----- Message from Janel Ríos sent at 3/25/2024  2:16 PM EDT -----  Subject: Message to Provider    QUESTIONS  Information for Provider? URGENT/IMPT? Patient is in need of home health   care and needs PCP to sign orders for this or give verbal permission. Also   there could be possible interactions with certain meds. Will also need   approval for a nutritionist and speech therapist. Please call Rosario   [phone] with questions.  ---------------------------------------------------------------------------  --------------  CALL BACK INFO  [phone]; OK to leave message on voicemail  ---------------------------------------------------------------------------  --------------  SCRIPT ANSWERS  Relationship to Patient? Covered Entity  Covered Entity Type? Home Health Care?  Representative Name? [facility]

## 2024-03-25 NOTE — TELEPHONE ENCOUNTER
Spoke to patient's daughter on Friday. Gave instructions from doctor alta for Tramadol and instructions for the water pill Dr Heredia sent over the weekend. Stressed to the daughter she needs to get in contact with Endocrinology asap

## 2024-03-25 NOTE — TELEPHONE ENCOUNTER
Please see if patient having any significant abdominal pain and if she is still passing gas on her bottom.  If no significant pain, nausea or vomiting and still passing gas from her bottom, okay to watch.  Could consider over-the-counter stool softener like Colace once or twice a day.  Efrain Berrios     Spoke with patient's daughter and ask if patient having any significant abdominal pain and if she is still passing gas on her bottom. Daughter states that patient is still passing gas ,no N/V and no significant abdominal pain. Daughter told Dr Heredia states ok to watch and can try OTC stool softener twice daily. Daughter voiced understanding.

## 2024-03-25 NOTE — TELEPHONE ENCOUNTER
I gave verbal orders for home health on Friday. Not sure if they need verbal orders for the other therapies. Will call back today.

## 2024-03-26 ENCOUNTER — TELEPHONE (OUTPATIENT)
Dept: INTERNAL MEDICINE CLINIC | Facility: CLINIC | Age: 89
End: 2024-03-26

## 2024-03-26 ENCOUNTER — HOME CARE VISIT (OUTPATIENT)
Dept: SCHEDULING | Facility: HOME HEALTH | Age: 89
End: 2024-03-26
Payer: MEDICARE

## 2024-03-26 VITALS
RESPIRATION RATE: 18 BRPM | DIASTOLIC BLOOD PRESSURE: 72 MMHG | SYSTOLIC BLOOD PRESSURE: 120 MMHG | TEMPERATURE: 97.2 F | OXYGEN SATURATION: 92 % | HEART RATE: 72 BPM

## 2024-03-26 VITALS
DIASTOLIC BLOOD PRESSURE: 70 MMHG | TEMPERATURE: 97.8 F | SYSTOLIC BLOOD PRESSURE: 132 MMHG | OXYGEN SATURATION: 94 % | HEART RATE: 78 BPM | RESPIRATION RATE: 16 BRPM

## 2024-03-26 PROCEDURE — G0151 HHCP-SERV OF PT,EA 15 MIN: HCPCS

## 2024-03-26 PROCEDURE — G0153 HHCP-SVS OF S/L PATH,EA 15MN: HCPCS

## 2024-03-26 ASSESSMENT — ENCOUNTER SYMPTOMS
PAIN LOCATION - PAIN QUALITY: ACHY
PAIN LOCATION - PAIN QUALITY: SORE
BOWEL INCONTINENCE: 1
DYSPNEA ACTIVITY LEVEL: AFTER AMBULATING LESS THAN 10 FT
TROUBLE SWALLOWING: 1
BOWEL INCONTINENCE: 1

## 2024-03-26 NOTE — TELEPHONE ENCOUNTER
Please call the patients daughter in regards to wound care in regards to a wheelchair  Thank you, Julianne

## 2024-03-27 ENCOUNTER — HOME CARE VISIT (OUTPATIENT)
Dept: SCHEDULING | Facility: HOME HEALTH | Age: 89
End: 2024-03-27
Payer: MEDICARE

## 2024-03-27 ENCOUNTER — HOME CARE VISIT (OUTPATIENT)
Dept: HOME HEALTH SERVICES | Facility: HOME HEALTH | Age: 89
End: 2024-03-27
Payer: MEDICARE

## 2024-03-27 VITALS
TEMPERATURE: 97.1 F | SYSTOLIC BLOOD PRESSURE: 125 MMHG | RESPIRATION RATE: 17 BRPM | HEART RATE: 69 BPM | DIASTOLIC BLOOD PRESSURE: 66 MMHG

## 2024-03-27 VITALS
SYSTOLIC BLOOD PRESSURE: 132 MMHG | HEART RATE: 80 BPM | OXYGEN SATURATION: 95 % | TEMPERATURE: 97.2 F | RESPIRATION RATE: 18 BRPM | DIASTOLIC BLOOD PRESSURE: 72 MMHG

## 2024-03-27 PROCEDURE — G0156 HHCP-SVS OF AIDE,EA 15 MIN: HCPCS

## 2024-03-27 PROCEDURE — G0299 HHS/HOSPICE OF RN EA 15 MIN: HCPCS

## 2024-03-27 ASSESSMENT — ENCOUNTER SYMPTOMS
STOOL DESCRIPTION: HARD
CONSTIPATION: 1
DYSPNEA ACTIVITY LEVEL: AFTER AMBULATING 10 - 20 FT
PAIN LOCATION - PAIN QUALITY: ACHING

## 2024-03-27 NOTE — TELEPHONE ENCOUNTER
Patient's daughter sent a message letting us know she was wanting to stop by the office after 3:00 today to figure out why her moms orders are not being approved. I informed Mrs Melton that I did submit the paperwork this morning that was needed and I apologize that there was some confusion or miscommunication. I also vocalized to the daughter that while I may not respond immediately via [employer], I am working between patients on all of the things that are being requested so I can not promise immediate responses. Understanding was voiced.

## 2024-03-27 NOTE — TELEPHONE ENCOUNTER
Patient's daughter has been informed [facility]

## 2024-03-28 ENCOUNTER — HOME CARE VISIT (OUTPATIENT)
Dept: SCHEDULING | Facility: HOME HEALTH | Age: 89
End: 2024-03-28
Payer: MEDICARE

## 2024-03-28 ENCOUNTER — HOME CARE VISIT (OUTPATIENT)
Dept: HOME HEALTH SERVICES | Facility: HOME HEALTH | Age: 89
End: 2024-03-28
Payer: MEDICARE

## 2024-03-28 ENCOUNTER — APPOINTMENT (OUTPATIENT)
Dept: GENERAL RADIOLOGY | Age: 89
DRG: 640 | End: 2024-03-28
Payer: MEDICARE

## 2024-03-28 ENCOUNTER — CARE COORDINATION (OUTPATIENT)
Dept: CARE COORDINATION | Facility: CLINIC | Age: 89
End: 2024-03-28

## 2024-03-28 ENCOUNTER — HOSPITAL ENCOUNTER (INPATIENT)
Age: 89
LOS: 8 days | Discharge: HOME HEALTH | DRG: 640 | End: 2024-04-05
Attending: EMERGENCY MEDICINE | Admitting: INTERNAL MEDICINE
Payer: MEDICARE

## 2024-03-28 ENCOUNTER — TELEPHONE (OUTPATIENT)
Dept: INTERNAL MEDICINE CLINIC | Facility: CLINIC | Age: 89
End: 2024-03-28

## 2024-03-28 DIAGNOSIS — G89.4 PAIN SYNDROME, CHRONIC: ICD-10-CM

## 2024-03-28 DIAGNOSIS — M79.89 SWELLING OF RIGHT UPPER EXTREMITY: ICD-10-CM

## 2024-03-28 DIAGNOSIS — D53.9 MACROCYTIC ANEMIA: ICD-10-CM

## 2024-03-28 DIAGNOSIS — I65.23 BILATERAL CAROTID ARTERY STENOSIS: ICD-10-CM

## 2024-03-28 DIAGNOSIS — I73.9 PERIPHERAL VASCULAR DISEASE (HCC): ICD-10-CM

## 2024-03-28 DIAGNOSIS — M79.672 PAIN IN BOTH FEET: ICD-10-CM

## 2024-03-28 DIAGNOSIS — E83.52 HYPERCALCEMIA: Primary | ICD-10-CM

## 2024-03-28 DIAGNOSIS — M25.561 RIGHT KNEE PAIN, UNSPECIFIED CHRONICITY: ICD-10-CM

## 2024-03-28 DIAGNOSIS — Z86.73 HISTORY OF CVA (CEREBROVASCULAR ACCIDENT): ICD-10-CM

## 2024-03-28 DIAGNOSIS — D64.9 ANEMIA, UNSPECIFIED TYPE: ICD-10-CM

## 2024-03-28 DIAGNOSIS — R62.7 ADULT FAILURE TO THRIVE: ICD-10-CM

## 2024-03-28 DIAGNOSIS — M79.671 PAIN IN BOTH FEET: ICD-10-CM

## 2024-03-28 DIAGNOSIS — E21.3 HYPERPARATHYROIDISM (HCC): ICD-10-CM

## 2024-03-28 LAB
ALBUMIN SERPL-MCNC: 2.9 G/DL (ref 3.2–4.6)
ALBUMIN/GLOB SERPL: 0.6 (ref 0.4–1.6)
ALP SERPL-CCNC: 85 U/L (ref 50–136)
ALT SERPL-CCNC: 24 U/L (ref 12–65)
ANION GAP SERPL CALC-SCNC: 4 MMOL/L (ref 2–11)
AST SERPL-CCNC: 25 U/L (ref 15–37)
BASOPHILS # BLD: 0 K/UL (ref 0–0.2)
BASOPHILS NFR BLD: 0 % (ref 0–2)
BILIRUB SERPL-MCNC: 2.7 MG/DL (ref 0.2–1.1)
BUN SERPL-MCNC: 21 MG/DL (ref 8–23)
CA-I BLD-MCNC: 5.76 MG/DL (ref 4–5.2)
CA-I BLD-MCNC: 5.88 MG/DL (ref 4–5.2)
CALCIUM SERPL-MCNC: 11.8 MG/DL (ref 8.3–10.4)
CHLORIDE SERPL-SCNC: 109 MMOL/L (ref 103–113)
CO2 SERPL-SCNC: 31 MMOL/L (ref 21–32)
CREAT SERPL-MCNC: 0.7 MG/DL (ref 0.6–1)
DIFFERENTIAL METHOD BLD: ABNORMAL
EOSINOPHIL # BLD: 0.2 K/UL (ref 0–0.8)
EOSINOPHIL NFR BLD: 2 % (ref 0.5–7.8)
ERYTHROCYTE [DISTWIDTH] IN BLOOD BY AUTOMATED COUNT: 16.3 % (ref 11.9–14.6)
GLOBULIN SER CALC-MCNC: 4.8 G/DL (ref 2.8–4.5)
GLUCOSE SERPL-MCNC: 88 MG/DL (ref 65–100)
HCT VFR BLD AUTO: 25.9 % (ref 35.8–46.3)
HCT VFR BLD AUTO: 27.8 % (ref 35.8–46.3)
HGB BLD-MCNC: 7.7 G/DL (ref 11.7–15.4)
HGB BLD-MCNC: 8.5 G/DL (ref 11.7–15.4)
HGB RETIC QN AUTO: 31 PG (ref 29–35)
IMM GRANULOCYTES # BLD AUTO: 0 K/UL (ref 0–0.5)
IMM GRANULOCYTES NFR BLD AUTO: 1 % (ref 0–5)
IMM RETICS NFR: 28.1 % (ref 3–15.9)
LACTATE SERPL-SCNC: 0.9 MMOL/L (ref 0.4–2)
LIPASE SERPL-CCNC: 98 U/L (ref 73–393)
LYMPHOCYTES # BLD: 1 K/UL (ref 0.5–4.6)
LYMPHOCYTES NFR BLD: 13 % (ref 13–44)
MCH RBC QN AUTO: 32.3 PG (ref 26.1–32.9)
MCHC RBC AUTO-ENTMCNC: 30.6 G/DL (ref 31.4–35)
MCV RBC AUTO: 105.7 FL (ref 82–102)
MONOCYTES # BLD: 0.7 K/UL (ref 0.1–1.3)
MONOCYTES NFR BLD: 9 % (ref 4–12)
NEUTS SEG # BLD: 5.8 K/UL (ref 1.7–8.2)
NEUTS SEG NFR BLD: 76 % (ref 43–78)
NRBC # BLD: 0 K/UL (ref 0–0.2)
PLATELET # BLD AUTO: 295 K/UL (ref 150–450)
PMV BLD AUTO: 11.9 FL (ref 9.4–12.3)
POTASSIUM SERPL-SCNC: 3.5 MMOL/L (ref 3.5–5.1)
PROT SERPL-MCNC: 7.7 G/DL (ref 6.3–8.2)
RBC # BLD AUTO: 2.63 M/UL (ref 4.05–5.2)
RETICS # AUTO: 0.21 M/UL (ref 0.03–0.1)
RETICS/RBC NFR AUTO: 8.8 % (ref 0.3–2)
SODIUM SERPL-SCNC: 144 MMOL/L (ref 136–146)
WBC # BLD AUTO: 7.7 K/UL (ref 4.3–11.1)

## 2024-03-28 PROCEDURE — 86850 RBC ANTIBODY SCREEN: CPT

## 2024-03-28 PROCEDURE — 1100000000 HC RM PRIVATE

## 2024-03-28 PROCEDURE — 85018 HEMOGLOBIN: CPT

## 2024-03-28 PROCEDURE — 99285 EMERGENCY DEPT VISIT HI MDM: CPT

## 2024-03-28 PROCEDURE — 83605 ASSAY OF LACTIC ACID: CPT

## 2024-03-28 PROCEDURE — 6360000002 HC RX W HCPCS: Performed by: EMERGENCY MEDICINE

## 2024-03-28 PROCEDURE — 2580000003 HC RX 258: Performed by: EMERGENCY MEDICINE

## 2024-03-28 PROCEDURE — 86901 BLOOD TYPING SEROLOGIC RH(D): CPT

## 2024-03-28 PROCEDURE — 86900 BLOOD TYPING SEROLOGIC ABO: CPT

## 2024-03-28 PROCEDURE — 85014 HEMATOCRIT: CPT

## 2024-03-28 PROCEDURE — 82330 ASSAY OF CALCIUM: CPT

## 2024-03-28 PROCEDURE — 30233N1 TRANSFUSION OF NONAUTOLOGOUS RED BLOOD CELLS INTO PERIPHERAL VEIN, PERCUTANEOUS APPROACH: ICD-10-PCS | Performed by: INTERNAL MEDICINE

## 2024-03-28 PROCEDURE — 86923 COMPATIBILITY TEST ELECTRIC: CPT

## 2024-03-28 PROCEDURE — 2580000003 HC RX 258: Performed by: INTERNAL MEDICINE

## 2024-03-28 PROCEDURE — 71045 X-RAY EXAM CHEST 1 VIEW: CPT

## 2024-03-28 PROCEDURE — 85025 COMPLETE CBC W/AUTO DIFF WBC: CPT

## 2024-03-28 PROCEDURE — 83690 ASSAY OF LIPASE: CPT

## 2024-03-28 PROCEDURE — 85046 RETICYTE/HGB CONCENTRATE: CPT

## 2024-03-28 PROCEDURE — 6370000000 HC RX 637 (ALT 250 FOR IP): Performed by: INTERNAL MEDICINE

## 2024-03-28 PROCEDURE — 36415 COLL VENOUS BLD VENIPUNCTURE: CPT

## 2024-03-28 PROCEDURE — 80053 COMPREHEN METABOLIC PANEL: CPT

## 2024-03-28 RX ORDER — POLYETHYLENE GLYCOL 3350 17 G/17G
17 POWDER, FOR SOLUTION ORAL DAILY PRN
Status: DISCONTINUED | OUTPATIENT
Start: 2024-03-28 | End: 2024-04-05 | Stop reason: HOSPADM

## 2024-03-28 RX ORDER — BRIMONIDINE TARTRATE 2 MG/ML
1 SOLUTION/ DROPS OPHTHALMIC 2 TIMES DAILY
Status: DISCONTINUED | OUTPATIENT
Start: 2024-03-28 | End: 2024-04-05 | Stop reason: HOSPADM

## 2024-03-28 RX ORDER — LATANOPROST 50 UG/ML
1 SOLUTION/ DROPS OPHTHALMIC NIGHTLY
Status: DISCONTINUED | OUTPATIENT
Start: 2024-03-28 | End: 2024-04-05 | Stop reason: HOSPADM

## 2024-03-28 RX ORDER — ACETAMINOPHEN 325 MG/1
650 TABLET ORAL 3 TIMES DAILY
Status: DISCONTINUED | OUTPATIENT
Start: 2024-03-28 | End: 2024-03-28 | Stop reason: SDUPTHER

## 2024-03-28 RX ORDER — LOSARTAN POTASSIUM 25 MG/1
25 TABLET ORAL DAILY
Status: DISCONTINUED | OUTPATIENT
Start: 2024-03-29 | End: 2024-03-29

## 2024-03-28 RX ORDER — BISACODYL 5 MG/1
10 TABLET, DELAYED RELEASE ORAL 2 TIMES DAILY PRN
Status: DISCONTINUED | OUTPATIENT
Start: 2024-03-28 | End: 2024-04-05 | Stop reason: HOSPADM

## 2024-03-28 RX ORDER — 0.9 % SODIUM CHLORIDE 0.9 %
1000 INTRAVENOUS SOLUTION INTRAVENOUS
Status: COMPLETED | OUTPATIENT
Start: 2024-03-28 | End: 2024-03-28

## 2024-03-28 RX ORDER — POTASSIUM CHLORIDE 7.45 MG/ML
10 INJECTION INTRAVENOUS PRN
Status: DISCONTINUED | OUTPATIENT
Start: 2024-03-28 | End: 2024-03-29

## 2024-03-28 RX ORDER — ONDANSETRON 2 MG/ML
4 INJECTION INTRAMUSCULAR; INTRAVENOUS EVERY 6 HOURS PRN
Status: DISCONTINUED | OUTPATIENT
Start: 2024-03-28 | End: 2024-04-05 | Stop reason: HOSPADM

## 2024-03-28 RX ORDER — SODIUM CHLORIDE 0.9 % (FLUSH) 0.9 %
5-40 SYRINGE (ML) INJECTION PRN
Status: DISCONTINUED | OUTPATIENT
Start: 2024-03-28 | End: 2024-04-05 | Stop reason: HOSPADM

## 2024-03-28 RX ORDER — POLYETHYLENE GLYCOL 3350 17 G/17G
17 POWDER, FOR SOLUTION ORAL 2 TIMES DAILY PRN
Status: DISCONTINUED | OUTPATIENT
Start: 2024-03-28 | End: 2024-03-28 | Stop reason: SDUPTHER

## 2024-03-28 RX ORDER — ONDANSETRON 4 MG/1
4 TABLET, ORALLY DISINTEGRATING ORAL EVERY 8 HOURS PRN
Status: DISCONTINUED | OUTPATIENT
Start: 2024-03-28 | End: 2024-04-05 | Stop reason: HOSPADM

## 2024-03-28 RX ORDER — CLOPIDOGREL BISULFATE 75 MG/1
75 TABLET ORAL DAILY
Status: DISCONTINUED | OUTPATIENT
Start: 2024-03-29 | End: 2024-03-30

## 2024-03-28 RX ORDER — MAGNESIUM SULFATE IN WATER 40 MG/ML
2000 INJECTION, SOLUTION INTRAVENOUS PRN
Status: DISCONTINUED | OUTPATIENT
Start: 2024-03-28 | End: 2024-03-29

## 2024-03-28 RX ORDER — MORPHINE SULFATE 4 MG/ML
4 INJECTION INTRAVENOUS ONCE
Status: COMPLETED | OUTPATIENT
Start: 2024-03-28 | End: 2024-03-28

## 2024-03-28 RX ORDER — SODIUM CHLORIDE 9 MG/ML
INJECTION, SOLUTION INTRAVENOUS PRN
Status: DISCONTINUED | OUTPATIENT
Start: 2024-03-28 | End: 2024-04-05 | Stop reason: HOSPADM

## 2024-03-28 RX ORDER — DORZOLAMIDE HYDROCHLORIDE AND TIMOLOL MALEATE 20; 5 MG/ML; MG/ML
1 SOLUTION/ DROPS OPHTHALMIC 2 TIMES DAILY
Status: DISCONTINUED | OUTPATIENT
Start: 2024-03-28 | End: 2024-04-05 | Stop reason: HOSPADM

## 2024-03-28 RX ORDER — BISACODYL 10 MG
10 SUPPOSITORY, RECTAL RECTAL DAILY PRN
Status: DISCONTINUED | OUTPATIENT
Start: 2024-03-28 | End: 2024-04-05 | Stop reason: HOSPADM

## 2024-03-28 RX ORDER — PRAVASTATIN SODIUM 20 MG
10 TABLET ORAL DAILY
Status: DISCONTINUED | OUTPATIENT
Start: 2024-03-28 | End: 2024-03-29

## 2024-03-28 RX ORDER — SODIUM CHLORIDE 0.9 % (FLUSH) 0.9 %
5-40 SYRINGE (ML) INJECTION EVERY 12 HOURS SCHEDULED
Status: DISCONTINUED | OUTPATIENT
Start: 2024-03-28 | End: 2024-04-05 | Stop reason: HOSPADM

## 2024-03-28 RX ORDER — METHIMAZOLE 5 MG/1
2.5 TABLET ORAL
Status: DISCONTINUED | OUTPATIENT
Start: 2024-03-29 | End: 2024-04-01

## 2024-03-28 RX ORDER — SODIUM CHLORIDE, SODIUM LACTATE, POTASSIUM CHLORIDE, CALCIUM CHLORIDE 600; 310; 30; 20 MG/100ML; MG/100ML; MG/100ML; MG/100ML
INJECTION, SOLUTION INTRAVENOUS CONTINUOUS
Status: DISCONTINUED | OUTPATIENT
Start: 2024-03-28 | End: 2024-03-31

## 2024-03-28 RX ORDER — POTASSIUM CHLORIDE 20 MEQ/1
40 TABLET, EXTENDED RELEASE ORAL PRN
Status: DISCONTINUED | OUTPATIENT
Start: 2024-03-28 | End: 2024-03-29

## 2024-03-28 RX ORDER — ACETAMINOPHEN 325 MG/1
650 TABLET ORAL EVERY 6 HOURS PRN
Status: DISCONTINUED | OUTPATIENT
Start: 2024-03-28 | End: 2024-04-05 | Stop reason: HOSPADM

## 2024-03-28 RX ORDER — ACETAMINOPHEN 650 MG/1
650 SUPPOSITORY RECTAL EVERY 6 HOURS PRN
Status: DISCONTINUED | OUTPATIENT
Start: 2024-03-28 | End: 2024-04-05 | Stop reason: HOSPADM

## 2024-03-28 RX ORDER — MONTELUKAST SODIUM 10 MG/1
10 TABLET ORAL DAILY
Status: DISCONTINUED | OUTPATIENT
Start: 2024-03-29 | End: 2024-04-05 | Stop reason: HOSPADM

## 2024-03-28 RX ADMIN — SODIUM CHLORIDE, POTASSIUM CHLORIDE, SODIUM LACTATE AND CALCIUM CHLORIDE: 600; 310; 30; 20 INJECTION, SOLUTION INTRAVENOUS at 18:00

## 2024-03-28 RX ADMIN — SODIUM CHLORIDE 999 ML: 9 INJECTION, SOLUTION INTRAVENOUS at 16:45

## 2024-03-28 RX ADMIN — BRIMONIDINE TARTRATE 1 DROP: 2 SOLUTION/ DROPS OPHTHALMIC at 21:36

## 2024-03-28 RX ADMIN — PRAVASTATIN SODIUM 10 MG: 20 TABLET ORAL at 17:56

## 2024-03-28 RX ADMIN — MORPHINE SULFATE 4 MG: 4 INJECTION INTRAVENOUS at 16:38

## 2024-03-28 RX ADMIN — SODIUM CHLORIDE, PRESERVATIVE FREE 10 ML: 5 INJECTION INTRAVENOUS at 21:36

## 2024-03-28 RX ADMIN — DORZOLAMIDE HYDROCHLORIDE AND TIMOLOL MALEATE 1 DROP: 20; 5 SOLUTION/ DROPS OPHTHALMIC at 21:36

## 2024-03-28 RX ADMIN — APIXABAN 5 MG: 5 TABLET, FILM COATED ORAL at 21:35

## 2024-03-28 RX ADMIN — LATANOPROST 1 DROP: 50 SOLUTION OPHTHALMIC at 21:36

## 2024-03-28 ASSESSMENT — PAIN DESCRIPTION - LOCATION
LOCATION: FOOT
LOCATION: OTHER (COMMENT)

## 2024-03-28 ASSESSMENT — PAIN SCALES - GENERAL
PAINLEVEL_OUTOF10: 2
PAINLEVEL_OUTOF10: 10

## 2024-03-28 ASSESSMENT — PAIN - FUNCTIONAL ASSESSMENT
PAIN_FUNCTIONAL_ASSESSMENT: 0-10
PAIN_FUNCTIONAL_ASSESSMENT: PREVENTS OR INTERFERES SOME ACTIVE ACTIVITIES AND ADLS

## 2024-03-28 ASSESSMENT — PAIN DESCRIPTION - DESCRIPTORS: DESCRIPTORS: ACHING

## 2024-03-28 NOTE — ACP (ADVANCE CARE PLANNING)
Advance Care Planning   Healthcare Decision Maker:    Primary Decision Maker: Allie Melton - Child - [phone]    Secondary Decision Maker: Niraj Melton - Child - [phone]    Click here to complete Healthcare Decision Makers including selection of the Healthcare Decision Maker Relationship (ie \"Primary\").  Today we documented Decision Maker(s) consistent with Legal Next of Kin hierarchy.

## 2024-03-28 NOTE — ED NOTES
TRANSFER - OUT REPORT:    Verbal report given to BEVERLEY Alexander on Lilliam Song  being transferred to [facility] for routine progression of patient care       Report consisted of patient's Situation, Background, Assessment and   Recommendations(SBAR).     Information from the following report(s) ED Encounter Summary and ED SBAR was reviewed with the receiving nurse.    [place] Fall Assessment:    Presents to emergency department  because of falls (Syncope, seizure, or loss of consciousness): No  Age > 70: Yes  Altered Mental Status, Intoxication with alcohol or substance confusion (Disorientation, impaired judgment, poor safety awaremess, or inability to follow instructions): Yes  Impaired Mobility: Ambulates or transfers with assistive devices or assistance; Unable to ambulate or transer.: Yes             Lines:   Peripheral IV 03/28/24 Proximal;Right Forearm (Active)        Opportunity for questions and clarification was provided.      Patient transported with:  Marquita Tolliver RN  03/28/24 7395

## 2024-03-28 NOTE — CARE COORDINATION
Opened chart for ANJELICA tracking. Noted on chart review patient returned to ED on 3/19/24 with discharge to home.  Patient and family declined ACM services for high risk utilization with Arlette Newell RN.  Patient has attended PCP follow up and remains in close contact with office.  Per mychart message for today, patient is returning to ED.  No further outreach is indicated.  Patient will be reassigned pending discharge disposition.

## 2024-03-28 NOTE — ED PROVIDER NOTES
Emergency Department Provider Note       PCP: Yash Heredia DO   Age: 89 y.o.   Sex: female     DISPOSITION Decision To Admit 03/28/2024 02:56:58 PM       ICD-10-CM    1. Hypercalcemia  E83.52       2. Hyperparathyroidism (HCC)  E21.3       3. Anemia, unspecified type  D64.9           Medical Decision Making     DDX:    Acute exacerbation asthma, COPD, CHF, COVID-19, influenza    Bronchitis, aspiration pneumonia, pneumonia,    PE, rib fracture, rib dysfunction, pleurisy, pneumothorax, aspiration of foreign body    ED Course as of 03/28/24 1501   Thu Mar 28, 2024   1458 I talked to the patient's daughter about the findings in the emergency department need for admission to the hospital.  Family is agreeable with the admission.  Dr. Rao the hospitalist on-call was message through the [employer] system [KH]      ED Course User Index  [KH] Lyle Hua DO     1 or more chronic illnesses with a severe exacerbation or progression.  Shared medical decision making was utilized in creating the patients health plan today.    I independently ordered and reviewed each unique test.  I reviewed external records: provider visit note from PCP.  I reviewed external records: provider visit note from outside specialist.  I reviewed external records: previous EKG including cardiologist interpretation.    I reviewed external records: previous lab results from outside ED.  I reviewed external records: previous imaging study including radiologist interpretation.   The patients assessment required an independent historian: Daughter.  The reason they were needed is important historical information not provided by the patient.  The patient was admitted and I have discussed patient management with the admitting provider.          History     89-year-old female being brought to the emergency department by her daughter secondary to decline in her activities of daily living.  Daughter states that her mom is in constant pain and she  is over 65 to rule out mesenteric ischemia)   Result Value Ref Range    Lactic Acid, Sepsis 0.9 0.4 - 2.0 MMOL/L   Calcium, Ionized   Result Value Ref Range    Calcium, Ionized 5.88 (H) 4.0 - 5.2 mg/dL         XR CHEST 1 VIEW    (Results Pending)                No results for input(s): \"COVID19\" in the last 72 hours.    Voice dictation software was used during the making of this note.  This software is not perfect and grammatical and other typographical errors may be present.  This note has not been completely proofread for errors.      Lyle Hua, DO  03/28/24 6512

## 2024-03-28 NOTE — TELEPHONE ENCOUNTER
Increasing concerns on behalf of home health and patient's daughter that patient is becoming more lethargic, increasing musculoskeletal pain, constipation, reduced appetite.  Given history of hypercalcemia from hyperparathyroidism and labs last week showing elevated ionized calcium I recommended going to the emergency room to be evaluated.  I personally spoke with Dr. Hua of [facility] emergency room explaining my concerns and that patient would likely be presenting.

## 2024-03-28 NOTE — CARE COORDINATION
Chart review complete, CM met with pt/daughter at bedside per MD request, pt currently lives with daughter in her home after being moved from [facility] in February 2024, pt recently was admitted to [facility] and then went to [facility] where she started having chest pain and was brought to the ED and then was dc home with family per daughters request, she did not want pt to return to [facility]. Pt had home care orders placed by ED CM for [facility] and also had HH orders for [facility], daughter cancelled [facility] and kept [facility] with RN, PT/OT/ST, SW and aid services and would like resumed at dc if pt dc home. Daughter unsure if she wants pt to return to any STR at this time. Per Allie but has become total care, CM called to [facility] to inquire about DME (hospital bed, WC and toiler riser) message left for return call from DME provider.  Demographics, insurance and PCP confirmed with daughter.    CM staff will remain available to assist as needed with dc needs.       03/28/24 1400   Service Assessment   Patient Orientation Self   Cognition Severely Impaired   History Provided By Child/Family  (nando Kelley)   Primary Caregiver Family   Accompanied By/Relationship nando Kelley   Support Systems Children   Patient's Healthcare Decision Maker is: Legal Next of Kin   PCP Verified by CM Yes   Last Visit to PCP Within last 3 months   Prior Functional Level Assistance with the following:;Bathing;Dressing;Toileting;Feeding;Cooking;Housework;Shopping;Mobility   Current Functional Level Assistance with the following:;Bathing;Dressing;Toileting;Feeding;Cooking;Housework;Shopping;Mobility   Can patient return to prior living arrangement Unknown at present   Ability to make needs known: Fair   Family able to assist with home care needs: Yes   Would you like for me to discuss the discharge plan with any other family members/significant others, and if so, who? Yes   Financial Resources Medicare Community

## 2024-03-28 NOTE — ED TRIAGE NOTES
Pt 89 y/o female arrives via [facility] ems from home with a complaint from family stating she has high sodium levels and has not eating or drank nor has she had a bowel movement x 2-3 weeks.

## 2024-03-29 PROBLEM — E44.0 MODERATE PROTEIN-CALORIE MALNUTRITION (HCC): Status: ACTIVE | Noted: 2024-03-29

## 2024-03-29 LAB
25(OH)D3 SERPL-MCNC: 46.9 NG/ML (ref 30–100)
ALBUMIN SERPL-MCNC: 2.4 G/DL (ref 3.2–4.6)
ALBUMIN/GLOB SERPL: 0.7 (ref 0.4–1.6)
ALP SERPL-CCNC: 74 U/L (ref 50–136)
ALT SERPL-CCNC: 18 U/L (ref 12–65)
ANION GAP SERPL CALC-SCNC: 5 MMOL/L (ref 2–11)
AST SERPL-CCNC: 23 U/L (ref 15–37)
BASOPHILS # BLD: 0 K/UL (ref 0–0.2)
BASOPHILS NFR BLD: 0 % (ref 0–2)
BILIRUB SERPL-MCNC: 2.4 MG/DL (ref 0.2–1.1)
BUN SERPL-MCNC: 20 MG/DL (ref 8–23)
CALCIUM SERPL-MCNC: 10.6 MG/DL (ref 8.3–10.4)
CALCIUM SERPL-MCNC: 10.7 MG/DL (ref 8.3–10.4)
CHLORIDE SERPL-SCNC: 112 MMOL/L (ref 103–113)
CO2 SERPL-SCNC: 27 MMOL/L (ref 21–32)
CREAT SERPL-MCNC: 0.5 MG/DL (ref 0.6–1)
DIFFERENTIAL METHOD BLD: ABNORMAL
EOSINOPHIL # BLD: 0.2 K/UL (ref 0–0.8)
EOSINOPHIL NFR BLD: 2 % (ref 0.5–7.8)
ERYTHROCYTE [DISTWIDTH] IN BLOOD BY AUTOMATED COUNT: 16.4 % (ref 11.9–14.6)
FERRITIN SERPL-MCNC: 1233 NG/ML (ref 8–388)
FOLATE SERPL-MCNC: 11.2 NG/ML (ref 3.1–17.5)
GLOBULIN SER CALC-MCNC: 3.3 G/DL (ref 2.8–4.5)
GLUCOSE SERPL-MCNC: 77 MG/DL (ref 65–100)
HAPTOGLOB SERPL-MCNC: 266 MG/DL (ref 30–200)
HCT VFR BLD AUTO: 23.2 % (ref 35.8–46.3)
HGB BLD-MCNC: 7.1 G/DL (ref 11.7–15.4)
IMM GRANULOCYTES # BLD AUTO: 0.1 K/UL (ref 0–0.5)
IMM GRANULOCYTES NFR BLD AUTO: 2 % (ref 0–5)
IRON SATN MFR SERPL: 32 %
IRON SERPL-MCNC: 46 UG/DL (ref 35–150)
LDH SERPL L TO P-CCNC: 201 U/L (ref 110–210)
LYMPHOCYTES # BLD: 1.1 K/UL (ref 0.5–4.6)
LYMPHOCYTES NFR BLD: 17 % (ref 13–44)
MAGNESIUM SERPL-MCNC: 1.7 MG/DL (ref 1.8–2.4)
MCH RBC QN AUTO: 32.1 PG (ref 26.1–32.9)
MCHC RBC AUTO-ENTMCNC: 30.6 G/DL (ref 31.4–35)
MCV RBC AUTO: 105 FL (ref 82–102)
MONOCYTES # BLD: 0.7 K/UL (ref 0.1–1.3)
MONOCYTES NFR BLD: 10 % (ref 4–12)
NEUTS SEG # BLD: 4.6 K/UL (ref 1.7–8.2)
NEUTS SEG NFR BLD: 69 % (ref 43–78)
NRBC # BLD: 0.02 K/UL (ref 0–0.2)
PLATELET # BLD AUTO: 226 K/UL (ref 150–450)
PMV BLD AUTO: 10.4 FL (ref 9.4–12.3)
POTASSIUM SERPL-SCNC: 3.5 MMOL/L (ref 3.5–5.1)
PROT SERPL-MCNC: 5.7 G/DL (ref 6.3–8.2)
PTH-INTACT SERPL-MCNC: 217.7 PG/ML (ref 18.5–88)
RBC # BLD AUTO: 2.21 M/UL (ref 4.05–5.2)
SODIUM SERPL-SCNC: 144 MMOL/L (ref 136–146)
T4 FREE SERPL-MCNC: 1.7 NG/DL (ref 0.78–1.46)
TIBC SERPL-MCNC: 142 UG/DL (ref 250–450)
TSH W FREE THYROID IF ABNORMAL: 0.3 UIU/ML (ref 0.36–3.74)
VIT B12 SERPL-MCNC: 5526 PG/ML (ref 193–986)
WBC # BLD AUTO: 6.7 K/UL (ref 4.3–11.1)

## 2024-03-29 PROCEDURE — 83970 ASSAY OF PARATHORMONE: CPT

## 2024-03-29 PROCEDURE — 36415 COLL VENOUS BLD VENIPUNCTURE: CPT

## 2024-03-29 PROCEDURE — 83540 ASSAY OF IRON: CPT

## 2024-03-29 PROCEDURE — 82746 ASSAY OF FOLIC ACID SERUM: CPT

## 2024-03-29 PROCEDURE — 84439 ASSAY OF FREE THYROXINE: CPT

## 2024-03-29 PROCEDURE — 83735 ASSAY OF MAGNESIUM: CPT

## 2024-03-29 PROCEDURE — 82607 VITAMIN B-12: CPT

## 2024-03-29 PROCEDURE — 82728 ASSAY OF FERRITIN: CPT

## 2024-03-29 PROCEDURE — 6370000000 HC RX 637 (ALT 250 FOR IP): Performed by: INTERNAL MEDICINE

## 2024-03-29 PROCEDURE — 2580000003 HC RX 258: Performed by: HOSPITALIST

## 2024-03-29 PROCEDURE — 2400000000

## 2024-03-29 PROCEDURE — 6360000002 HC RX W HCPCS: Performed by: HOSPITALIST

## 2024-03-29 PROCEDURE — 6370000000 HC RX 637 (ALT 250 FOR IP): Performed by: HOSPITALIST

## 2024-03-29 PROCEDURE — 92610 EVALUATE SWALLOWING FUNCTION: CPT

## 2024-03-29 PROCEDURE — 83550 IRON BINDING TEST: CPT

## 2024-03-29 PROCEDURE — 83615 LACTATE (LD) (LDH) ENZYME: CPT

## 2024-03-29 PROCEDURE — 84443 ASSAY THYROID STIM HORMONE: CPT

## 2024-03-29 PROCEDURE — 6360000002 HC RX W HCPCS: Performed by: STUDENT IN AN ORGANIZED HEALTH CARE EDUCATION/TRAINING PROGRAM

## 2024-03-29 PROCEDURE — 2580000003 HC RX 258: Performed by: INTERNAL MEDICINE

## 2024-03-29 PROCEDURE — 83010 ASSAY OF HAPTOGLOBIN QUANT: CPT

## 2024-03-29 PROCEDURE — 80053 COMPREHEN METABOLIC PANEL: CPT

## 2024-03-29 PROCEDURE — 82306 VITAMIN D 25 HYDROXY: CPT

## 2024-03-29 PROCEDURE — 85025 COMPLETE CBC W/AUTO DIFF WBC: CPT

## 2024-03-29 PROCEDURE — 1100000000 HC RM PRIVATE

## 2024-03-29 PROCEDURE — 2500000003 HC RX 250 WO HCPCS: Performed by: HOSPITALIST

## 2024-03-29 RX ORDER — MORPHINE SULFATE 2 MG/ML
2 INJECTION, SOLUTION INTRAMUSCULAR; INTRAVENOUS ONCE
Status: COMPLETED | OUTPATIENT
Start: 2024-03-29 | End: 2024-03-29

## 2024-03-29 RX ORDER — LANOLIN ALCOHOL/MO/W.PET/CERES
400 CREAM (GRAM) TOPICAL DAILY
Status: DISCONTINUED | OUTPATIENT
Start: 2024-03-29 | End: 2024-04-01

## 2024-03-29 RX ORDER — HYDROCODONE BITARTRATE AND ACETAMINOPHEN 7.5; 325 MG/1; MG/1
1 TABLET ORAL EVERY 4 HOURS PRN
Status: DISCONTINUED | OUTPATIENT
Start: 2024-03-29 | End: 2024-04-05 | Stop reason: HOSPADM

## 2024-03-29 RX ORDER — MORPHINE SULFATE 2 MG/ML
2 INJECTION, SOLUTION INTRAMUSCULAR; INTRAVENOUS EVERY 4 HOURS PRN
Status: DISCONTINUED | OUTPATIENT
Start: 2024-03-29 | End: 2024-04-05 | Stop reason: HOSPADM

## 2024-03-29 RX ORDER — POTASSIUM CHLORIDE 20 MEQ/1
40 TABLET, EXTENDED RELEASE ORAL ONCE
Status: COMPLETED | OUTPATIENT
Start: 2024-03-29 | End: 2024-03-29

## 2024-03-29 RX ORDER — LOSARTAN POTASSIUM 50 MG/1
50 TABLET ORAL DAILY
Status: DISCONTINUED | OUTPATIENT
Start: 2024-03-30 | End: 2024-04-05 | Stop reason: HOSPADM

## 2024-03-29 RX ORDER — CLOPIDOGREL BISULFATE 75 MG/1
75 TABLET ORAL DAILY
Qty: 90 TABLET | Refills: 0 | OUTPATIENT
Start: 2024-03-29

## 2024-03-29 RX ADMIN — LATANOPROST 1 DROP: 50 SOLUTION OPHTHALMIC at 19:53

## 2024-03-29 RX ADMIN — SODIUM CHLORIDE, POTASSIUM CHLORIDE, SODIUM LACTATE AND CALCIUM CHLORIDE: 600; 310; 30; 20 INJECTION, SOLUTION INTRAVENOUS at 05:05

## 2024-03-29 RX ADMIN — BRIMONIDINE TARTRATE 1 DROP: 2 SOLUTION/ DROPS OPHTHALMIC at 09:21

## 2024-03-29 RX ADMIN — SODIUM CHLORIDE, POTASSIUM CHLORIDE, SODIUM LACTATE AND CALCIUM CHLORIDE: 600; 310; 30; 20 INJECTION, SOLUTION INTRAVENOUS at 15:13

## 2024-03-29 RX ADMIN — METHIMAZOLE 2.5 MG: 5 TABLET ORAL at 09:08

## 2024-03-29 RX ADMIN — PRAVASTATIN SODIUM 10 MG: 20 TABLET ORAL at 09:07

## 2024-03-29 RX ADMIN — DICLOFENAC SODIUM 2 G: 10 GEL TOPICAL at 04:26

## 2024-03-29 RX ADMIN — ACETAMINOPHEN 650 MG: 325 TABLET ORAL at 18:40

## 2024-03-29 RX ADMIN — APIXABAN 5 MG: 5 TABLET, FILM COATED ORAL at 19:49

## 2024-03-29 RX ADMIN — DICLOFENAC SODIUM 2 G: 10 GEL TOPICAL at 19:56

## 2024-03-29 RX ADMIN — POTASSIUM CHLORIDE 40 MEQ: 1500 TABLET, EXTENDED RELEASE ORAL at 11:10

## 2024-03-29 RX ADMIN — BISACODYL 10 MG: 10 SUPPOSITORY RECTAL at 04:26

## 2024-03-29 RX ADMIN — DORZOLAMIDE HYDROCHLORIDE AND TIMOLOL MALEATE 1 DROP: 20; 5 SOLUTION/ DROPS OPHTHALMIC at 09:19

## 2024-03-29 RX ADMIN — TUBERCULIN PURIFIED PROTEIN DERIVATIVE 5 UNITS: 5 INJECTION, SOLUTION INTRADERMAL at 09:23

## 2024-03-29 RX ADMIN — MORPHINE SULFATE 2 MG: 2 INJECTION, SOLUTION INTRAMUSCULAR; INTRAVENOUS at 05:04

## 2024-03-29 RX ADMIN — SODIUM CHLORIDE, PRESERVATIVE FREE 10 ML: 5 INJECTION INTRAVENOUS at 19:50

## 2024-03-29 RX ADMIN — MONTELUKAST 10 MG: 10 TABLET, FILM COATED ORAL at 09:09

## 2024-03-29 RX ADMIN — MORPHINE SULFATE 2 MG: 2 INJECTION, SOLUTION INTRAMUSCULAR; INTRAVENOUS at 19:49

## 2024-03-29 RX ADMIN — MORPHINE SULFATE 2 MG: 2 INJECTION, SOLUTION INTRAMUSCULAR; INTRAVENOUS at 12:21

## 2024-03-29 RX ADMIN — HYDROCODONE BITARTRATE AND ACETAMINOPHEN 1 TABLET: 7.5; 325 TABLET ORAL at 22:04

## 2024-03-29 RX ADMIN — DORZOLAMIDE HYDROCHLORIDE AND TIMOLOL MALEATE 1 DROP: 20; 5 SOLUTION/ DROPS OPHTHALMIC at 19:52

## 2024-03-29 RX ADMIN — BRIMONIDINE TARTRATE 1 DROP: 2 SOLUTION/ DROPS OPHTHALMIC at 19:52

## 2024-03-29 RX ADMIN — MAGNESIUM GLUCONATE 500 MG ORAL TABLET 400 MG: 500 TABLET ORAL at 11:11

## 2024-03-29 RX ADMIN — LOSARTAN POTASSIUM 25 MG: 25 TABLET, FILM COATED ORAL at 09:09

## 2024-03-29 RX ADMIN — APIXABAN 5 MG: 5 TABLET, FILM COATED ORAL at 09:05

## 2024-03-29 ASSESSMENT — PAIN SCALES - GENERAL
PAINLEVEL_OUTOF10: 0
PAINLEVEL_OUTOF10: 10
PAINLEVEL_OUTOF10: 2
PAINLEVEL_OUTOF10: 10
PAINLEVEL_OUTOF10: 6
PAINLEVEL_OUTOF10: 3
PAINLEVEL_OUTOF10: 6
PAINLEVEL_OUTOF10: 0
PAINLEVEL_OUTOF10: 6

## 2024-03-29 ASSESSMENT — PAIN DESCRIPTION - LOCATION
LOCATION: GENERALIZED
LOCATION: FOOT;GROIN
LOCATION: FOOT

## 2024-03-29 ASSESSMENT — PAIN DESCRIPTION - ORIENTATION
ORIENTATION: LEFT
ORIENTATION: RIGHT;LEFT

## 2024-03-29 ASSESSMENT — PAIN - FUNCTIONAL ASSESSMENT
PAIN_FUNCTIONAL_ASSESSMENT: PREVENTS OR INTERFERES WITH MANY ACTIVE NOT PASSIVE ACTIVITIES
PAIN_FUNCTIONAL_ASSESSMENT: PREVENTS OR INTERFERES WITH MANY ACTIVE NOT PASSIVE ACTIVITIES

## 2024-03-29 ASSESSMENT — PAIN DESCRIPTION - DESCRIPTORS
DESCRIPTORS: SHARP
DESCRIPTORS: ACHING;SORE

## 2024-03-29 NOTE — CARE COORDINATION
Dispo update:  NOEMI spoke to Ms. Song and her daughter Ms. Allie Melton in room 619.  She wanted to know the status of the DME order for WC, hospital bed, and BSC from [facility].  NOEMI called [facility] at [phone] and spoke to Aline.  She said they received the order, and ok for BSC, but order does not have details to justify manual WC and electro/mechanical hospital bed.      NOEMI revised the order for WC and bed, and faxed to [phone].  CM also updated patient and daughter.

## 2024-03-29 NOTE — PROGRESS NOTES
Comprehensive Nutrition Assessment    Type and Reason for Visit: Initial, Positive Nutrition Screen  Malnutrition Screening Tool: Malnutrition Screen  Have you recently lost weight without trying?: 24 to 33 pounds (3 points)  Have you been eating poorly because of a decreased appetite?: Yes (1 point)  Malnutrition Screening Tool Score: 4    Nutrition Recommendations/Plan:   Meals and Snacks:  Diet: Continue current order  Nutrition Supplement Therapy:  Medical food supplement therapy:  Continue Ensure Enlive three times per day (this provides 350 kcal and 20 grams protein per bottle) prefers strawberry     Malnutrition Assessment:  Malnutrition Status: Moderate malnutrition  Context: Chronic Illness  Findings of clinical characteristics of malnutrition:   Energy Intake:  Mild decrease in energy intake (Comment) (intake declining since last admission)  Weight Loss:  Mild weight loss (specify amount and time period) (12% loss over 1 year per IM office records)     Body Fat Loss:  Mild body fat loss Orbital, Buccal region   Muscle Mass Loss:  Mild muscle mass loss Temples (temporalis), Hand (interosseous)     Nutrition Assessment:  Nutrition History:   3/11 RD note: Daughter provides majority of nutrition hx. Reports that her intake has been low since late February. States that in the 5-6 days prior to admission this declined further and she was taking in little to no PO, including fluids. At baseline, she takes two Ensure Max per day, snacks throughout the day, and then has one larger meal in the evening.    Dtr reports she ate some at rehab, since being home intake has primarily been ensure and water, occasionally takes some fruit or sweet potato.  Recent home intake of 2 ensure and 48 ounces of water.       Do You Have Any Cultural, Latter-day, or Ethnic Food Preferences?: No   Weight History:   Inpt wt hx as follows:  155# estimated 3/19/24, 3/28/24 155# stated and 167# bed scale.    IM office records: 143# 3/7/24,  identified in malnutrition assessment  Nutrition Interventions:   Food and/or Nutrient Delivery: Continue Current Diet, Continue Oral Nutrition Supplement     Coordination of Nutrition Care: Interdisciplinary Rounds      Goals:      Active Goal:  (Meet >50% estimated needs by RD f/u)       Nutrition Monitoring and Evaluation:      Food/Nutrient Intake Outcomes: Food and Nutrient Intake, Supplement Intake  Physical Signs/Symptoms Outcomes: Biochemical Data, GI Status, Meal Time Behavior, Weight    Discharge Planning:    Too soon to determine    RUFINO BARNETT, GUS

## 2024-03-29 NOTE — PROGRESS NOTES
Hospitalist History and Physical   Admit Date:  3/28/2024 12:08 PM   Name:  Lilliam Song   Age:  89 y.o.  Sex:  female  :  1934   MRN:  [id number]   Room:  [facility]/    Presenting/Chief Complaint: OTHER     Reason(s) for Admission: Hypercalcemia [E83.52]  Hyperparathyroidism (HCC) [E21.3]  Anemia, unspecified type [D64.9]     History of Present Illness:   Lilliam Song is a 89 y.o. female with medical history of hyperparathyroidism (on chronic Cinacalcet per endocrinology that she follows with at [place]), mulinodular goiter, HTN, HLD, PVD, and low-grade papillary tumors of the bladder (recommendation for TURBT but patient and family wishes to opt for monitoring) presents at the request of her PCP, Dr. Heredia, due to worsening fatigue, failure to thrive, and constipation. She was recently hospitalized and discharged from [place] after being diagnosed and started on Eliquis for a pulmonary embolism. She was discharged to SNF but daughter took her home as she was not having a good experience. Since that time, she has essentially been in bed and not able to get up more than once or twice to the recliner. Additionally, she has had constipation for \"weeks\" and has only been able to eat \"half a cup of grits\". She also \"hurts all over, everywhere.\"   ED course: ionized calcium of 5.88. Bili of 2.7. Hgb of 8.5. CXR negative. Hospitalist consulted for admission.     Today, has mild generalized pain, low appetite, no ac events    Assessment & Plan:     #  Hypercalcemia, due to hyperparathyroidism, improving w/ current Rx, down to 10.5  - hold Lasix  - IVF resuscitation  - trend iCa levels, if refractive, than can try bisphosphonate and/or calcitonin     # Adult failure to thrive, encouraged PO and supplements  # Constipation  - likely exacerbated by chronic hypercalcemia  - aggressive bowel regimen and IVFs  - PT/OT consults  - if not improving, consider palliative medicine consult  - patient at very high risk

## 2024-03-29 NOTE — PROGRESS NOTES
Pt turned q2 hours.     Rounds performed throughout shift. Pt denies needs at this time. Bed in low position, locked and call light/personal items within reach. Will report to night shift nurse.

## 2024-03-29 NOTE — PROGRESS NOTES
4 Eyes Skin Assessment     NAME:  Lilliam Song  YOB: 1934  MEDICAL RECORD NUMBER:  [id number]    The patient is being assessed for  Admission    I agree that at least one RN has performed a thorough Head to Toe Skin Assessment on the patient. ALL assessment sites listed below have been assessed.      Areas assessed by both nurses:    Head, Face, Ears, Shoulders, Back, Chest, Arms, Elbows, Hands, Sacrum. Buttock, Coccyx, Ischium, Legs. Feet and Heels, and Under Medical Devices         Does the Patient have a Wound? Yes wound(s) were present on assessment. LDA wound assessment was Initiated and completed by RN       Layo Prevention initiated by RN: Yes  Wound Care Orders initiated by RN: Yes    Pressure Injury (Stage 3,4, Unstageable, DTI, NWPT, and Complex wounds) if present, place Wound referral order by RN under : Yes    New Ostomies, if present place, Ostomy referral order under : No     Nurse 1 eSignature: Electronically signed by Grace Clemons RN on 3/29/24 at 5:20 AM EDT    **SHARE this note so that the co-signing nurse can place an eSignature**    Nurse 2 eSignature: Electronically signed by Quinn Sanchez RN on 3/29/24 at 5:24 AM EDT

## 2024-03-29 NOTE — H&P
Hospitalist History and Physical   Admit Date:  3/28/2024 12:08 PM   Name:  Lilliam Song   Age:  89 y.o.  Sex:  female  :  1934   MRN:  [id number]   Room:  [facility]    Presenting/Chief Complaint: OTHER     Reason(s) for Admission: Hypercalcemia [E83.52]  Hyperparathyroidism (HCC) [E21.3]  Anemia, unspecified type [D64.9]     History of Present Illness:   Lilliam Song is a 89 y.o. female with medical history of hyperparathyroidism (on chronic Cinacalcet per endocrinology that she follows with at [place]), mulinodular goiter, HTN, HLD, PVD, and low-grade papillary tumors of the bladder (recommendation for TURBT but patient and family wishes to opt for monitoring) presents at the request of her PCP, Dr. Heredia, due to worsening fatigue, failure to thrive, and constipation. She was recently hospitalized and discharged from [facility] after being diagnosed and started on Eliquis for a pulmonary embolism. She was discharged to SNF but daughter took her home as she was not having a good experience. Since that time, she has essentially been in bed and not able to get up more than once or twice to the recliner. Additionally, she has had constipation for \"weeks\" and has only been able to eat \"half a cup of grits\". She also \"hurts all over, everywhere.\"     ED course: ionized calcium of 5.88. Bili of 2.7. Hgb of 8.5. CXR negative. Hospitalist consulted for admission.     Assessment & Plan:     #  Hypercalcemia  - hold Lasix  - IVF resuscitation  - trend iCa levels, if refractive, than can try bisphosphonate and/or calcitonin     # Adult failure to thrive  # Constipation  - likely exacerbated by chronic hypercalcemia  - aggressive bowel regimen and IVFs  - PT/OT consults  - if not improving, consider palliative medicine consult  - patient at very high risk of continued clinical deterioration     #  Glaucoma  - home drops    # Essential hypertension, benign  - losartan    # Multinodular thyroid  - methimazole  Oral, DAILY    vitamin D 25 MCG (1000 UT) CAPS Oral       I have personally reviewed labs and tests:  Recent Results (from the past 24 hour(s))   CBC with Diff    Collection Time: 03/28/24  1:00 PM   Result Value Ref Range    WBC 7.7 4.3 - 11.1 K/uL    RBC 2.63 (L) 4.05 - 5.2 M/uL    Hemoglobin 8.5 (L) 11.7 - 15.4 g/dL    Hematocrit 27.8 (L) 35.8 - 46.3 %    .7 (H) 82 - 102 FL    MCH 32.3 26.1 - 32.9 PG    MCHC 30.6 (L) 31.4 - 35.0 g/dL    RDW 16.3 (H) 11.9 - 14.6 %    Platelets 295 150 - 450 K/uL    MPV 11.9 9.4 - 12.3 FL    nRBC 0.00 0.0 - 0.2 K/uL    Differential Type AUTOMATED      Neutrophils % 76 43 - 78 %    Lymphocytes % 13 13 - 44 %    Monocytes % 9 4.0 - 12.0 %    Eosinophils % 2 0.5 - 7.8 %    Basophils % 0 0.0 - 2.0 %    Immature Granulocytes 1 0.0 - 5.0 %    Neutrophils Absolute 5.8 1.7 - 8.2 K/UL    Lymphocytes Absolute 1.0 0.5 - 4.6 K/UL    Monocytes Absolute 0.7 0.1 - 1.3 K/UL    Eosinophils Absolute 0.2 0.0 - 0.8 K/UL    Basophils Absolute 0.0 0.0 - 0.2 K/UL    Absolute Immature Granulocyte 0.0 0.0 - 0.5 K/UL   CMP    Collection Time: 03/28/24  1:00 PM   Result Value Ref Range    Sodium 144 136 - 146 mmol/L    Potassium 3.5 3.5 - 5.1 mmol/L    Chloride 109 103 - 113 mmol/L    CO2 31 21 - 32 mmol/L    Anion Gap 4 2 - 11 mmol/L    Glucose 88 65 - 100 mg/dL    BUN 21 8 - 23 MG/DL    Creatinine 0.70 0.6 - 1.0 MG/DL    Est, Glom Filt Rate 82 >60 ml/min/1.73m2    Calcium 11.8 (H) 8.3 - 10.4 MG/DL    Total Bilirubin 2.7 (H) 0.2 - 1.1 MG/DL    ALT 24 12 - 65 U/L    AST 25 15 - 37 U/L    Alk Phosphatase 85 50 - 136 U/L    Total Protein 7.7 6.3 - 8.2 g/dL    Albumin 2.9 (L) 3.2 - 4.6 g/dL    Globulin 4.8 (H) 2.8 - 4.5 g/dL    Albumin/Globulin Ratio 0.6 0.4 - 1.6     Lipase    Collection Time: 03/28/24  1:00 PM   Result Value Ref Range    Lipase 98 73 - 393 U/L   Lactate, Sepsis (Select if patient is over 65 to rule out mesenteric ischemia)    Collection Time: 03/28/24  1:00 PM   Result Value Ref Range

## 2024-03-29 NOTE — PLAN OF CARE
Problem: Discharge Planning  Goal: Discharge to home or other facility with appropriate resources  Outcome: Progressing  Flowsheets (Taken 3/28/2024 2000 by Grace Clemons, RN)  Discharge to home or other facility with appropriate resources:   Identify barriers to discharge with patient and caregiver   Arrange for needed discharge resources and transportation as appropriate   Identify discharge learning needs (meds, wound care, etc)     Problem: Pain  Goal: Verbalizes/displays adequate comfort level or baseline comfort level  Outcome: Progressing     Problem: Skin/Tissue Integrity  Goal: Absence of new skin breakdown  Description: 1.  Monitor for areas of redness and/or skin breakdown  2.  Assess vascular access sites hourly  3.  Every 4-6 hours minimum:  Change oxygen saturation probe site  4.  Every 4-6 hours:  If on nasal continuous positive airway pressure, respiratory therapy assess nares and determine need for appliance change or resting period.  Outcome: Progressing     Problem: Safety - Adult  Goal: Free from fall injury  Outcome: Progressing     Problem: ABCDS Injury Assessment  Goal: Absence of physical injury  Outcome: Progressing

## 2024-03-29 NOTE — PROGRESS NOTES
Completed wound assessment with Catherine SAVAGE RN. Noted blackened, nonblanchable dry heals bilaterally. Left inner ankle dark, and nonblanchable. Discussed with wound nurse Nyla. Allevyn placed on sacrum. Heal boots placed bilaterally.

## 2024-03-29 NOTE — WOUND CARE
Bilateral heels with darkened hardened skin lateral and darkened skin posterior heel, DTI's for all 4 areas the hardened areas are becoming eschar. Top of gluteal cleft over coccyx with 1x0.5cm open area within a darkened 3x2cm area, DTI with partial thickness loss.  Patient lives at home has a leela regular mattress, may not qualify yet for a over lay mattress.  Patients family state they have been trying to get a hospital bed and are having difficulty. Recommend frequent turning, offloading foam dressing over sacral area and offloading boots for heels. Will monitor.

## 2024-03-30 LAB
AMORPH CRY URNS QL MICRO: ABNORMAL
APPEARANCE UR: ABNORMAL
BACTERIA URNS QL MICRO: ABNORMAL /HPF
BILIRUB UR QL: NEGATIVE
CA-I BLD-MCNC: 5.81 MG/DL (ref 4–5.2)
CALCIUM SERPL-MCNC: 11.2 MG/DL (ref 8.3–10.4)
CASTS URNS QL MICRO: 0 /LPF
COLOR UR: ABNORMAL
CRYSTALS URNS QL MICRO: 0 /LPF
EPI CELLS #/AREA URNS HPF: ABNORMAL /HPF
GLUCOSE UR STRIP.AUTO-MCNC: NEGATIVE MG/DL
HCT VFR BLD AUTO: 23.4 % (ref 35.8–46.3)
HGB BLD-MCNC: 6.2 G/DL (ref 11.7–15.4)
HGB BLD-MCNC: 7.1 G/DL (ref 11.7–15.4)
HGB UR QL STRIP: ABNORMAL
HISTORY CHECK: NORMAL
KETONES UR QL STRIP.AUTO: NEGATIVE MG/DL
LEUKOCYTE ESTERASE UR QL STRIP.AUTO: ABNORMAL
MM INDURATION, POC: 0 MM (ref 0–5)
MUCOUS THREADS URNS QL MICRO: 0 /LPF
NITRITE UR QL STRIP.AUTO: POSITIVE
PH UR STRIP: 8 (ref 5–9)
PPD, POC: NEGATIVE
PROT UR STRIP-MCNC: 30 MG/DL
RBC #/AREA URNS HPF: ABNORMAL /HPF
SP GR UR REFRACTOMETRY: 1.02 (ref 1–1.02)
URINE CULTURE IF INDICATED: ABNORMAL
UROBILINOGEN UR QL STRIP.AUTO: 2 EU/DL (ref 0.2–1)
WBC URNS QL MICRO: ABNORMAL /HPF

## 2024-03-30 PROCEDURE — 87086 URINE CULTURE/COLONY COUNT: CPT

## 2024-03-30 PROCEDURE — 82330 ASSAY OF CALCIUM: CPT

## 2024-03-30 PROCEDURE — 85014 HEMATOCRIT: CPT

## 2024-03-30 PROCEDURE — 6370000000 HC RX 637 (ALT 250 FOR IP): Performed by: HOSPITALIST

## 2024-03-30 PROCEDURE — 36430 TRANSFUSION BLD/BLD COMPNT: CPT

## 2024-03-30 PROCEDURE — 85018 HEMOGLOBIN: CPT

## 2024-03-30 PROCEDURE — 36415 COLL VENOUS BLD VENIPUNCTURE: CPT

## 2024-03-30 PROCEDURE — 51798 US URINE CAPACITY MEASURE: CPT

## 2024-03-30 PROCEDURE — 2580000003 HC RX 258: Performed by: INTERNAL MEDICINE

## 2024-03-30 PROCEDURE — 6360000002 HC RX W HCPCS: Performed by: INTERNAL MEDICINE

## 2024-03-30 PROCEDURE — P9016 RBC LEUKOCYTES REDUCED: HCPCS

## 2024-03-30 PROCEDURE — 2580000003 HC RX 258: Performed by: HOSPITALIST

## 2024-03-30 PROCEDURE — 6360000002 HC RX W HCPCS: Performed by: HOSPITALIST

## 2024-03-30 PROCEDURE — 1100000000 HC RM PRIVATE

## 2024-03-30 PROCEDURE — 81001 URINALYSIS AUTO W/SCOPE: CPT

## 2024-03-30 PROCEDURE — 87186 SC STD MICRODIL/AGAR DIL: CPT

## 2024-03-30 PROCEDURE — 6370000000 HC RX 637 (ALT 250 FOR IP): Performed by: INTERNAL MEDICINE

## 2024-03-30 PROCEDURE — 82310 ASSAY OF CALCIUM: CPT

## 2024-03-30 RX ORDER — CALCITONIN SALMON 200 [USP'U]/ML
4 INJECTION, SOLUTION INTRAMUSCULAR; SUBCUTANEOUS DAILY
Status: DISCONTINUED | OUTPATIENT
Start: 2024-03-30 | End: 2024-03-30

## 2024-03-30 RX ORDER — PANTOPRAZOLE SODIUM 40 MG/1
40 TABLET, DELAYED RELEASE ORAL
Status: DISCONTINUED | OUTPATIENT
Start: 2024-03-30 | End: 2024-04-05 | Stop reason: HOSPADM

## 2024-03-30 RX ORDER — SODIUM CHLORIDE 9 MG/ML
INJECTION, SOLUTION INTRAVENOUS PRN
Status: DISCONTINUED | OUTPATIENT
Start: 2024-03-30 | End: 2024-04-05 | Stop reason: HOSPADM

## 2024-03-30 RX ADMIN — SODIUM CHLORIDE, POTASSIUM CHLORIDE, SODIUM LACTATE AND CALCIUM CHLORIDE: 600; 310; 30; 20 INJECTION, SOLUTION INTRAVENOUS at 15:42

## 2024-03-30 RX ADMIN — MAGNESIUM GLUCONATE 500 MG ORAL TABLET 400 MG: 500 TABLET ORAL at 09:37

## 2024-03-30 RX ADMIN — PANTOPRAZOLE SODIUM 40 MG: 40 TABLET, DELAYED RELEASE ORAL at 15:17

## 2024-03-30 RX ADMIN — HYDROCODONE BITARTRATE AND ACETAMINOPHEN 1 TABLET: 7.5; 325 TABLET ORAL at 09:54

## 2024-03-30 RX ADMIN — WATER 1000 MG: 1 INJECTION INTRAMUSCULAR; INTRAVENOUS; SUBCUTANEOUS at 18:39

## 2024-03-30 RX ADMIN — BRIMONIDINE TARTRATE 1 DROP: 2 SOLUTION/ DROPS OPHTHALMIC at 09:35

## 2024-03-30 RX ADMIN — MONTELUKAST 10 MG: 10 TABLET, FILM COATED ORAL at 21:39

## 2024-03-30 RX ADMIN — DORZOLAMIDE HYDROCHLORIDE AND TIMOLOL MALEATE 1 DROP: 20; 5 SOLUTION/ DROPS OPHTHALMIC at 09:34

## 2024-03-30 RX ADMIN — LATANOPROST 1 DROP: 50 SOLUTION OPHTHALMIC at 22:32

## 2024-03-30 RX ADMIN — HYDROCODONE BITARTRATE AND ACETAMINOPHEN 1 TABLET: 7.5; 325 TABLET ORAL at 21:23

## 2024-03-30 RX ADMIN — LOSARTAN POTASSIUM 50 MG: 50 TABLET, FILM COATED ORAL at 09:37

## 2024-03-30 RX ADMIN — BRIMONIDINE TARTRATE 1 DROP: 2 SOLUTION/ DROPS OPHTHALMIC at 21:34

## 2024-03-30 RX ADMIN — SODIUM CHLORIDE, POTASSIUM CHLORIDE, SODIUM LACTATE AND CALCIUM CHLORIDE: 600; 310; 30; 20 INJECTION, SOLUTION INTRAVENOUS at 03:55

## 2024-03-30 RX ADMIN — SODIUM CHLORIDE 125 MG: 9 INJECTION, SOLUTION INTRAVENOUS at 10:58

## 2024-03-30 RX ADMIN — DORZOLAMIDE HYDROCHLORIDE AND TIMOLOL MALEATE 1 DROP: 20; 5 SOLUTION/ DROPS OPHTHALMIC at 21:34

## 2024-03-30 RX ADMIN — SODIUM CHLORIDE, PRESERVATIVE FREE 10 ML: 5 INJECTION INTRAVENOUS at 21:34

## 2024-03-30 RX ADMIN — SODIUM CHLORIDE, POTASSIUM CHLORIDE, SODIUM LACTATE AND CALCIUM CHLORIDE: 600; 310; 30; 20 INJECTION, SOLUTION INTRAVENOUS at 21:46

## 2024-03-30 RX ADMIN — SODIUM CHLORIDE, PRESERVATIVE FREE 10 ML: 5 INJECTION INTRAVENOUS at 09:39

## 2024-03-30 RX ADMIN — HYDROCODONE BITARTRATE AND ACETAMINOPHEN 1 TABLET: 7.5; 325 TABLET ORAL at 15:17

## 2024-03-30 ASSESSMENT — PAIN DESCRIPTION - DESCRIPTORS
DESCRIPTORS: ACHING

## 2024-03-30 ASSESSMENT — PAIN SCALES - GENERAL
PAINLEVEL_OUTOF10: 6
PAINLEVEL_OUTOF10: 0
PAINLEVEL_OUTOF10: 6
PAINLEVEL_OUTOF10: 0
PAINLEVEL_OUTOF10: 0
PAINLEVEL_OUTOF10: 6

## 2024-03-30 ASSESSMENT — PAIN DESCRIPTION - LOCATION
LOCATION: GENERALIZED
LOCATION: GENERALIZED
LOCATION: KNEE

## 2024-03-30 ASSESSMENT — PAIN DESCRIPTION - ORIENTATION: ORIENTATION: RIGHT;LEFT

## 2024-03-30 NOTE — PROGRESS NOTES
Hourly rounds completed.  Pt oriented x3 with intermittent confusion.  Pain managed per MAR.  Pt turned Q2 hours except when declined by family.  Pt only voided 100mL overnight.  Bladder scan this AM shows 445mL, Dr. Dominguez notified, no new orders.  Bed in low position, wheels locked, call light within reach.

## 2024-03-30 NOTE — PROGRESS NOTES
Paged by RN about dysuria with UA showing 4+ bacteruria and nitrites. Urine culture already ordered. Will add on empiric Rocephin. Follow cultures. Rest per progress notes.

## 2024-03-30 NOTE — PLAN OF CARE
Problem: Discharge Planning  Goal: Discharge to home or other facility with appropriate resources  3/30/2024 0831 by Ceci Butler RN  Outcome: Progressing  3/29/2024 2144 by Amanda Rogers RN  Outcome: Progressing  Flowsheets (Taken 3/29/2024 1909)  Discharge to home or other facility with appropriate resources:   Identify barriers to discharge with patient and caregiver   Arrange for needed discharge resources and transportation as appropriate   Identify discharge learning needs (meds, wound care, etc)   Refer to discharge planning if patient needs post-hospital services based on physician order or complex needs related to functional status, cognitive ability or social support system     Problem: Pain  Goal: Verbalizes/displays adequate comfort level or baseline comfort level  3/30/2024 0831 by Ceci Butler RN  Outcome: Progressing  3/29/2024 2144 by Amanda Rogers RN  Outcome: Progressing  Flowsheets (Taken 3/29/2024 1945)  Verbalizes/displays adequate comfort level or baseline comfort level: Encourage patient to monitor pain and request assistance     Problem: Skin/Tissue Integrity  Goal: Absence of new skin breakdown  Description: 1.  Monitor for areas of redness and/or skin breakdown  2.  Assess vascular access sites hourly  3.  Every 4-6 hours minimum:  Change oxygen saturation probe site  4.  Every 4-6 hours:  If on nasal continuous positive airway pressure, respiratory therapy assess nares and determine need for appliance change or resting period.  3/30/2024 0831 by Ceci Butler RN  Outcome: Progressing  3/29/2024 2144 by Amanda Rogers RN  Outcome: Progressing

## 2024-03-30 NOTE — PLAN OF CARE
Problem: Discharge Planning  Goal: Discharge to home or other facility with appropriate resources  Outcome: Progressing  Flowsheets (Taken 3/29/2024 1909)  Discharge to home or other facility with appropriate resources:   Identify barriers to discharge with patient and caregiver   Arrange for needed discharge resources and transportation as appropriate   Identify discharge learning needs (meds, wound care, etc)   Refer to discharge planning if patient needs post-hospital services based on physician order or complex needs related to functional status, cognitive ability or social support system     Problem: Pain  Goal: Verbalizes/displays adequate comfort level or baseline comfort level  Outcome: Progressing  Flowsheets (Taken 3/29/2024 1945)  Verbalizes/displays adequate comfort level or baseline comfort level: Encourage patient to monitor pain and request assistance     Problem: Skin/Tissue Integrity  Goal: Absence of new skin breakdown  Description: 1.  Monitor for areas of redness and/or skin breakdown  2.  Assess vascular access sites hourly  3.  Every 4-6 hours minimum:  Change oxygen saturation probe site  4.  Every 4-6 hours:  If on nasal continuous positive airway pressure, respiratory therapy assess nares and determine need for appliance change or resting period.  Outcome: Progressing     Problem: Safety - Adult  Goal: Free from fall injury  Outcome: Progressing  Flowsheets (Taken 3/29/2024 1909)  Free From Fall Injury: Instruct family/caregiver on patient safety     Problem: ABCDS Injury Assessment  Goal: Absence of physical injury  Outcome: Progressing  Flowsheets (Taken 3/29/2024 1909)  Absence of Physical Injury: Implement safety measures based on patient assessment     Problem: Chronic Conditions and Co-morbidities  Goal: Patient's chronic conditions and co-morbidity symptoms are monitored and maintained or improved  Outcome: Progressing  Flowsheets (Taken 3/29/2024 1909)  Care Plan - Patient's

## 2024-03-30 NOTE — PROGRESS NOTES
Night Cover Update    Hb drop to 6.2 (from 7.1) and notified just now but unfortunately no consent is in the chart.  No urgent or emergent indication fofr transfusion; can follow-up and transfuse if appropriate during day coverage.

## 2024-03-30 NOTE — PROGRESS NOTES
Hospitalist   Admit Date:  3/28/2024 12:08 PM   Name:  Lilliam Song   Age:  89 y.o.  Sex:  female  :  1934   MRN:  [id number]   Room:  [facility]    Presenting/Chief Complaint: OTHER     Reason(s) for Admission: Hypercalcemia [E83.52]  Hyperparathyroidism (HCC) [E21.3]  Anemia, unspecified type [D64.9]     History of Present Illness:   Lilliam Song is a 89 y.o. female with medical history of hyperparathyroidism (on chronic Cinacalcet per endocrinology that she follows with at [place]), mulinodular goiter, HTN, HLD, PVD, and low-grade papillary tumors of the bladder (recommendation for TURBT but patient and family wishes to opt for monitoring) presents at the request of her PCP, Dr. Heredia, due to worsening fatigue, failure to thrive, and constipation. She was recently hospitalized and discharged from [place] after being diagnosed and started on Eliquis for a pulmonary embolism. She was discharged to SNF but daughter took her home as she was not having a good experience. Since that time, she has essentially been in bed and not able to get up more than once or twice to the recliner. Additionally, she has had constipation for \"weeks\" and has only been able to eat \"half a cup of grits\". She also \"hurts all over, everywhere.\"   ED course: ionized calcium of 5.88. Bili of 2.7. Hgb of 8.5. CXR negative. Hospitalist consulted for admission.     Today, Hb drop <7, no ac events, no Ssx of bleeding, mild distal feet pain but pt is comfortable, family at bedside.   Repetitive out of focus questions from daughter between yesterday and today despite answering it.    Assessment & Plan:     #  Hypercalcemia, due to hyperparathyroidism, improving w/ current Rx, down to 10.5 then mildly up this morning  Calcitonin x2 ordered this AM along w/ IVF  AM lab    Anemia of ch dz and likely associated iron deficiency. On eliquis recently for PE in 2024 and still on Plavix for hx of PAD  PRBC 1 U transfusion

## 2024-03-30 NOTE — CONSENT
Informed Consent for Blood Component Transfusion Note    I have discussed with the daughter the rationale for blood component transfusion; its benefits in treating or preventing fatigue, organ damage, or death; and its risk which includes mild transfusion reactions, rare risk of blood borne infection, or more serious but rare reactions. I have discussed the alternatives to transfusion, including the risk and consequences of not receiving transfusion. The daughter had an opportunity to ask questions and had agreed to proceed with transfusion of blood components.    Electronically signed by Martin Dhaliwal MD on 3/30/24 at 9:01 AM EDT

## 2024-03-31 ENCOUNTER — APPOINTMENT (OUTPATIENT)
Dept: GENERAL RADIOLOGY | Age: 89
DRG: 640 | End: 2024-03-31
Payer: MEDICARE

## 2024-03-31 LAB
ABO + RH BLD: NORMAL
ANION GAP SERPL CALC-SCNC: 2 MMOL/L (ref 2–11)
BLD PROD TYP BPU: NORMAL
BLOOD BANK BLOOD PRODUCT EXPIRATION DATE: NORMAL
BLOOD BANK DISPENSE STATUS: NORMAL
BLOOD BANK ISBT PRODUCT BLOOD TYPE: 9500
BLOOD BANK PRODUCT CODE: NORMAL
BLOOD BANK UNIT TYPE AND RH: NORMAL
BLOOD GROUP ANTIBODIES SERPL: NORMAL
BPU ID: NORMAL
BUN SERPL-MCNC: 17 MG/DL (ref 8–23)
CALCIUM SERPL-MCNC: 10.8 MG/DL (ref 8.3–10.4)
CALCIUM SERPL-MCNC: 11.2 MG/DL (ref 8.3–10.4)
CHLORIDE SERPL-SCNC: 112 MMOL/L (ref 103–113)
CO2 SERPL-SCNC: 28 MMOL/L (ref 21–32)
CREAT SERPL-MCNC: 0.4 MG/DL (ref 0.6–1)
CROSSMATCH RESULT: NORMAL
GLUCOSE SERPL-MCNC: 89 MG/DL (ref 65–100)
HCT VFR BLD AUTO: 26 % (ref 35.8–46.3)
HGB BLD-MCNC: 7.3 G/DL (ref 11.7–15.4)
HGB BLD-MCNC: 8 G/DL (ref 11.7–15.4)
MM INDURATION, POC: 0 MM (ref 0–5)
POTASSIUM SERPL-SCNC: 4.5 MMOL/L (ref 3.5–5.1)
PPD, POC: NEGATIVE
SODIUM SERPL-SCNC: 142 MMOL/L (ref 136–146)
SPECIMEN EXP DATE BLD: NORMAL
UNIT DIVISION: 0
UNIT ISSUE DATE/TIME: NORMAL

## 2024-03-31 PROCEDURE — 73562 X-RAY EXAM OF KNEE 3: CPT

## 2024-03-31 PROCEDURE — 6360000002 HC RX W HCPCS: Performed by: HOSPITALIST

## 2024-03-31 PROCEDURE — 6370000000 HC RX 637 (ALT 250 FOR IP): Performed by: INTERNAL MEDICINE

## 2024-03-31 PROCEDURE — 2580000003 HC RX 258: Performed by: INTERNAL MEDICINE

## 2024-03-31 PROCEDURE — 6360000002 HC RX W HCPCS: Performed by: INTERNAL MEDICINE

## 2024-03-31 PROCEDURE — 85014 HEMATOCRIT: CPT

## 2024-03-31 PROCEDURE — 82310 ASSAY OF CALCIUM: CPT

## 2024-03-31 PROCEDURE — 85018 HEMOGLOBIN: CPT

## 2024-03-31 PROCEDURE — 1100000000 HC RM PRIVATE

## 2024-03-31 PROCEDURE — 80048 BASIC METABOLIC PNL TOTAL CA: CPT

## 2024-03-31 PROCEDURE — 36415 COLL VENOUS BLD VENIPUNCTURE: CPT

## 2024-03-31 PROCEDURE — 6370000000 HC RX 637 (ALT 250 FOR IP): Performed by: HOSPITALIST

## 2024-03-31 RX ORDER — POLYETHYLENE GLYCOL 3350 17 G/17G
17 POWDER, FOR SOLUTION ORAL 2 TIMES DAILY
Status: DISCONTINUED | OUTPATIENT
Start: 2024-03-31 | End: 2024-04-05

## 2024-03-31 RX ORDER — SENNA AND DOCUSATE SODIUM 50; 8.6 MG/1; MG/1
2 TABLET, FILM COATED ORAL 2 TIMES DAILY
Status: DISCONTINUED | OUTPATIENT
Start: 2024-03-31 | End: 2024-04-05 | Stop reason: HOSPADM

## 2024-03-31 RX ORDER — ZOLEDRONIC ACID 0.04 MG/ML
4 INJECTION, SOLUTION INTRAVENOUS ONCE
Status: COMPLETED | OUTPATIENT
Start: 2024-03-31 | End: 2024-03-31

## 2024-03-31 RX ORDER — SODIUM CHLORIDE 9 MG/ML
INJECTION, SOLUTION INTRAVENOUS CONTINUOUS
Status: DISCONTINUED | OUTPATIENT
Start: 2024-03-31 | End: 2024-04-04

## 2024-03-31 RX ADMIN — METHIMAZOLE 2.5 MG: 5 TABLET ORAL at 07:24

## 2024-03-31 RX ADMIN — SODIUM CHLORIDE: 9 INJECTION, SOLUTION INTRAVENOUS at 15:59

## 2024-03-31 RX ADMIN — SODIUM CHLORIDE: 9 INJECTION, SOLUTION INTRAVENOUS at 18:10

## 2024-03-31 RX ADMIN — HYDROCODONE BITARTRATE AND ACETAMINOPHEN 1 TABLET: 7.5; 325 TABLET ORAL at 07:24

## 2024-03-31 RX ADMIN — SODIUM CHLORIDE, PRESERVATIVE FREE 10 ML: 5 INJECTION INTRAVENOUS at 19:30

## 2024-03-31 RX ADMIN — PANTOPRAZOLE SODIUM 40 MG: 40 TABLET, DELAYED RELEASE ORAL at 07:24

## 2024-03-31 RX ADMIN — ZOLEDRONIC ACID 4 MG: 0.04 INJECTION, SOLUTION INTRAVENOUS at 17:48

## 2024-03-31 RX ADMIN — DORZOLAMIDE HYDROCHLORIDE AND TIMOLOL MALEATE 1 DROP: 20; 5 SOLUTION/ DROPS OPHTHALMIC at 07:33

## 2024-03-31 RX ADMIN — POLYETHYLENE GLYCOL 3350 17 G: 17 POWDER, FOR SOLUTION ORAL at 19:26

## 2024-03-31 RX ADMIN — MONTELUKAST 10 MG: 10 TABLET, FILM COATED ORAL at 19:29

## 2024-03-31 RX ADMIN — PANTOPRAZOLE SODIUM 40 MG: 40 TABLET, DELAYED RELEASE ORAL at 17:40

## 2024-03-31 RX ADMIN — DOCUSATE SODIUM 50 MG AND SENNOSIDES 8.6 MG 2 TABLET: 8.6; 5 TABLET, FILM COATED ORAL at 19:29

## 2024-03-31 RX ADMIN — SODIUM CHLORIDE, PRESERVATIVE FREE 10 ML: 5 INJECTION INTRAVENOUS at 07:37

## 2024-03-31 RX ADMIN — LATANOPROST 1 DROP: 50 SOLUTION OPHTHALMIC at 19:39

## 2024-03-31 RX ADMIN — SODIUM CHLORIDE: 9 INJECTION, SOLUTION INTRAVENOUS at 23:15

## 2024-03-31 RX ADMIN — MAGNESIUM GLUCONATE 500 MG ORAL TABLET 400 MG: 500 TABLET ORAL at 07:24

## 2024-03-31 RX ADMIN — LOSARTAN POTASSIUM 50 MG: 50 TABLET, FILM COATED ORAL at 07:24

## 2024-03-31 RX ADMIN — MORPHINE SULFATE 2 MG: 2 INJECTION, SOLUTION INTRAMUSCULAR; INTRAVENOUS at 19:26

## 2024-03-31 RX ADMIN — WATER 1000 MG: 1 INJECTION INTRAMUSCULAR; INTRAVENOUS; SUBCUTANEOUS at 18:10

## 2024-03-31 RX ADMIN — DORZOLAMIDE HYDROCHLORIDE AND TIMOLOL MALEATE 1 DROP: 20; 5 SOLUTION/ DROPS OPHTHALMIC at 19:39

## 2024-03-31 RX ADMIN — BRIMONIDINE TARTRATE 1 DROP: 2 SOLUTION/ DROPS OPHTHALMIC at 19:39

## 2024-03-31 RX ADMIN — BRIMONIDINE TARTRATE 1 DROP: 2 SOLUTION/ DROPS OPHTHALMIC at 07:33

## 2024-03-31 ASSESSMENT — PAIN DESCRIPTION - LOCATION
LOCATION: GENERALIZED
LOCATION: GENERALIZED

## 2024-03-31 ASSESSMENT — PAIN DESCRIPTION - DESCRIPTORS: DESCRIPTORS: ACHING

## 2024-03-31 ASSESSMENT — PAIN SCALES - GENERAL
PAINLEVEL_OUTOF10: 0
PAINLEVEL_OUTOF10: 9
PAINLEVEL_OUTOF10: 6

## 2024-03-31 NOTE — PROGRESS NOTES
Hospitalist Progress Note   Admit Date:  3/28/2024 12:08 PM   Name:  Lilliam Song   Age:  89 y.o.  Sex:  female  :  1934   MRN:  [id number]   Room:  [facility]/    Presenting/Chief Complaint: OTHER     Reason(s) for Admission: Hypercalcemia [E83.52]  Hyperparathyroidism (HCC) [E21.3]  Anemia, unspecified type [D64.9]     Hospital Course:   AS Per prior documentation:    \"Lilliam Song is a 89 y.o. female with medical history of hyperparathyroidism (on chronic Cinacalcet per endocrinology that she follows with at [place]), mulinodular goiter, HTN, HLD, PVD, and low-grade papillary tumors of the bladder (recommendation for TURBT but patient and family wishes to opt for monitoring) presents at the request of her PCP, Dr. Heredia, due to worsening fatigue, failure to thrive, and constipation. She was recently hospitalized and discharged from [place] after being diagnosed and started on Eliquis for a pulmonary embolism. She was discharged to SNF but daughter took her home as she was not having a good experience. Since that time, she has essentially been in bed and not able to get up more than once or twice to the recliner. Additionally, she has had constipation for \"weeks\" and has only been able to eat \"half a cup of grits\". She also \"hurts all over, everywhere.\"      ED course: ionized calcium of 5.88. Bili of 2.7. Hgb of 8.5. CXR negative. Hospitalist consulted for admission. \"    Subjective & 24hr Events:   Pt seen and evaluated.  New Patient for me today   Multiple complaints- chronic & new  C/o pain all over - especially to the knees and feet   Calcium up  S/p 1 unit of prbcs for HB of 6.2- no signs of active bleed- likely dilutional- will monitor  On Rocephin for UTI- cultures pending     Assessment & Plan:     Principal Problem:    Hypercalcemia   Hx of hyperparathyroidism   Restart agressive ivfls  Zolendric acid x 1   Check bmp     Active Problems:    UTI  Continue rocephin  F/up cultures  Range    History Check Historical check performed    PLEASE READ & DOCUMENT PPD TEST IN 24 HRS    Collection Time: 03/30/24  9:30 AM   Result Value Ref Range    PPD, (POC) Negative     mm Induration 0 0 - 5 mm   Hemoglobin and Hematocrit    Collection Time: 03/30/24  4:30 PM   Result Value Ref Range    Hemoglobin 7.1 (L) 11.7 - 15.4 g/dL    Hematocrit 23.4 (L) 35.8 - 46.3 %   Urinalysis with Reflex to Culture    Collection Time: 03/30/24  5:16 PM    Specimen: Urine   Result Value Ref Range    Color, UA ORANGE      Appearance CLOUDY      Specific Gravity, UA 1.018 1.001 - 1.023      pH, Urine 8.0 5.0 - 9.0      Protein, UA 30 (A) NEG mg/dL    Glucose, UA Negative mg/dL    Ketones, Urine Negative NEG mg/dL    Bilirubin Urine Negative NEG      Blood, Urine MODERATE (A) NEG      Urobilinogen, Urine 2.0 (H) 0.2 - 1.0 EU/dL    Nitrite, Urine Positive (A) NEG      Leukocyte Esterase, Urine MODERATE (A) NEG      WBC, UA  0 /hpf    RBC, UA 20-50 0 /hpf    BACTERIA, URINE 4+ (H) 0 /hpf    Urine Culture if Indicated URINE CULTURE ORDERED      Epithelial Cells UA 0-3 0 /hpf    Casts 0 0 /lpf    Crystals 0 0 /LPF    Amorphous Crystal 4+ (H) 0    Mucus, UA 0 0 /lpf   Culture, Urine    Collection Time: 03/30/24  5:16 PM    Specimen: Urine   Result Value Ref Range    Special Requests NO SPECIAL REQUESTS  Reflexed from [id number]        Culture        No growth after short period of incubation. Further results to follow after overnight incubation.   Calcium    Collection Time: 03/31/24  6:08 AM   Result Value Ref Range    Calcium 11.2 (H) 8.3 - 10.4 MG/DL   Hemoglobin    Collection Time: 03/31/24  6:08 AM   Result Value Ref Range    Hemoglobin 7.3 (L) 11.7 - 15.4 g/dL   PLEASE READ & DOCUMENT PPD TEST IN 48 HRS    Collection Time: 03/31/24  9:30 AM   Result Value Ref Range    PPD, (POC) Negative     mm Induration 0 0 - 5 mm       No results for input(s): \"COVID19\" in the last 72 hours.    Current Meds:  Current

## 2024-03-31 NOTE — PLAN OF CARE
Problem: Discharge Planning  Goal: Discharge to home or other facility with appropriate resources  3/30/2024 2101 by Ceci Ríos RN  Outcome: Progressing  3/30/2024 0831 by Ceci Butler RN  Outcome: Progressing     Problem: Pain  Goal: Verbalizes/displays adequate comfort level or baseline comfort level  3/30/2024 2101 by Ceci Ríos RN  Outcome: Progressing  3/30/2024 0831 by Ceci Butler RN  Outcome: Progressing     Problem: Skin/Tissue Integrity  Goal: Absence of new skin breakdown  Description: 1.  Monitor for areas of redness and/or skin breakdown  2.  Assess vascular access sites hourly  3.  Every 4-6 hours minimum:  Change oxygen saturation probe site  4.  Every 4-6 hours:  If on nasal continuous positive airway pressure, respiratory therapy assess nares and determine need for appliance change or resting period.  3/30/2024 2101 by Ceci Ríos RN  Outcome: Progressing  3/30/2024 0831 by Ceci Butler RN  Outcome: Progressing     Problem: Safety - Adult  Goal: Free from fall injury  Outcome: Progressing     Problem: ABCDS Injury Assessment  Goal: Absence of physical injury  Outcome: Progressing     Problem: Chronic Conditions and Co-morbidities  Goal: Patient's chronic conditions and co-morbidity symptoms are monitored and maintained or improved  Outcome: Progressing

## 2024-03-31 NOTE — PLAN OF CARE
Problem: Discharge Planning  Goal: Discharge to home or other facility with appropriate resources  3/31/2024 0958 by Ceci Butler RN  Outcome: Progressing  3/30/2024 2101 by Ceci Ríos RN  Outcome: Progressing  Flowsheets (Taken 3/30/2024 1916)  Discharge to home or other facility with appropriate resources:   Identify barriers to discharge with patient and caregiver   Arrange for needed discharge resources and transportation as appropriate   Identify discharge learning needs (meds, wound care, etc)     Problem: Pain  Goal: Verbalizes/displays adequate comfort level or baseline comfort level  3/31/2024 0958 by Ceci Butler RN  Outcome: Progressing  3/30/2024 2101 by Ceci Ríos RN  Outcome: Progressing     Problem: Skin/Tissue Integrity  Goal: Absence of new skin breakdown  Description: 1.  Monitor for areas of redness and/or skin breakdown  2.  Assess vascular access sites hourly  3.  Every 4-6 hours minimum:  Change oxygen saturation probe site  4.  Every 4-6 hours:  If on nasal continuous positive airway pressure, respiratory therapy assess nares and determine need for appliance change or resting period.  3/31/2024 0958 by Ceci Butler RN  Outcome: Progressing  3/30/2024 2101 by Ceci Ríos RN  Outcome: Progressing

## 2024-04-01 ENCOUNTER — APPOINTMENT (OUTPATIENT)
Dept: ULTRASOUND IMAGING | Age: 89
DRG: 640 | End: 2024-04-01
Payer: MEDICARE

## 2024-04-01 PROBLEM — Z51.5 ENCOUNTER FOR PALLIATIVE CARE: Status: ACTIVE | Noted: 2024-04-01

## 2024-04-01 LAB
ALBUMIN SERPL-MCNC: 2.2 G/DL (ref 3.2–4.6)
ALBUMIN/GLOB SERPL: 0.6 (ref 0.4–1.6)
ALP SERPL-CCNC: 80 U/L (ref 50–136)
ALT SERPL-CCNC: 17 U/L (ref 12–65)
ANION GAP SERPL CALC-SCNC: 4 MMOL/L (ref 2–11)
AST SERPL-CCNC: 30 U/L (ref 15–37)
BASOPHILS # BLD: 0 K/UL (ref 0–0.2)
BASOPHILS NFR BLD: 1 % (ref 0–2)
BILIRUB DIRECT SERPL-MCNC: 0.3 MG/DL
BILIRUB SERPL-MCNC: 0.7 MG/DL (ref 0.2–1.1)
BUN SERPL-MCNC: 9 MG/DL (ref 8–23)
CALCIUM SERPL-MCNC: 11.2 MG/DL (ref 8.3–10.4)
CHLORIDE SERPL-SCNC: 111 MMOL/L (ref 103–113)
CO2 SERPL-SCNC: 23 MMOL/L (ref 21–32)
CREAT SERPL-MCNC: 0.4 MG/DL (ref 0.6–1)
DIFFERENTIAL METHOD BLD: ABNORMAL
EOSINOPHIL # BLD: 0.2 K/UL (ref 0–0.8)
EOSINOPHIL NFR BLD: 3 % (ref 0.5–7.8)
ERYTHROCYTE [DISTWIDTH] IN BLOOD BY AUTOMATED COUNT: 18.7 % (ref 11.9–14.6)
GLOBULIN SER CALC-MCNC: 4 G/DL (ref 2.8–4.5)
GLUCOSE SERPL-MCNC: 88 MG/DL (ref 65–100)
HCT VFR BLD AUTO: 24.3 % (ref 35.8–46.3)
HCT VFR BLD AUTO: 24.8 % (ref 35.8–46.3)
HGB BLD-MCNC: 7.5 G/DL (ref 11.7–15.4)
HGB BLD-MCNC: 7.7 G/DL (ref 11.7–15.4)
IMM GRANULOCYTES # BLD AUTO: 0.2 K/UL (ref 0–0.5)
IMM GRANULOCYTES NFR BLD AUTO: 3 % (ref 0–5)
LYMPHOCYTES # BLD: 1 K/UL (ref 0.5–4.6)
LYMPHOCYTES NFR BLD: 16 % (ref 13–44)
MAGNESIUM SERPL-MCNC: 1.6 MG/DL (ref 1.8–2.4)
MCH RBC QN AUTO: 32 PG (ref 26.1–32.9)
MCHC RBC AUTO-ENTMCNC: 31 G/DL (ref 31.4–35)
MCV RBC AUTO: 102.9 FL (ref 82–102)
MONOCYTES # BLD: 0.6 K/UL (ref 0.1–1.3)
MONOCYTES NFR BLD: 9 % (ref 4–12)
NEUTS SEG # BLD: 4.2 K/UL (ref 1.7–8.2)
NEUTS SEG NFR BLD: 68 % (ref 43–78)
NRBC # BLD: 0.12 K/UL (ref 0–0.2)
PLATELET # BLD AUTO: 268 K/UL (ref 150–450)
PMV BLD AUTO: 10.3 FL (ref 9.4–12.3)
POTASSIUM SERPL-SCNC: 3.8 MMOL/L (ref 3.5–5.1)
PROT SERPL-MCNC: 6.2 G/DL (ref 6.3–8.2)
RBC # BLD AUTO: 2.41 M/UL (ref 4.05–5.2)
SODIUM SERPL-SCNC: 138 MMOL/L (ref 136–146)
WBC # BLD AUTO: 6.1 K/UL (ref 4.3–11.1)

## 2024-04-01 PROCEDURE — 93971 EXTREMITY STUDY: CPT

## 2024-04-01 PROCEDURE — 6370000000 HC RX 637 (ALT 250 FOR IP): Performed by: INTERNAL MEDICINE

## 2024-04-01 PROCEDURE — 92523 SPEECH SOUND LANG COMPREHEN: CPT

## 2024-04-01 PROCEDURE — 6370000000 HC RX 637 (ALT 250 FOR IP): Performed by: HOSPITALIST

## 2024-04-01 PROCEDURE — 80048 BASIC METABOLIC PNL TOTAL CA: CPT

## 2024-04-01 PROCEDURE — 99221 1ST HOSP IP/OBS SF/LOW 40: CPT | Performed by: STUDENT IN AN ORGANIZED HEALTH CARE EDUCATION/TRAINING PROGRAM

## 2024-04-01 PROCEDURE — 6360000002 HC RX W HCPCS: Performed by: INTERNAL MEDICINE

## 2024-04-01 PROCEDURE — 85018 HEMOGLOBIN: CPT

## 2024-04-01 PROCEDURE — 80076 HEPATIC FUNCTION PANEL: CPT

## 2024-04-01 PROCEDURE — 36415 COLL VENOUS BLD VENIPUNCTURE: CPT

## 2024-04-01 PROCEDURE — 93971 EXTREMITY STUDY: CPT | Performed by: RADIOLOGY

## 2024-04-01 PROCEDURE — 2580000003 HC RX 258: Performed by: INTERNAL MEDICINE

## 2024-04-01 PROCEDURE — 83735 ASSAY OF MAGNESIUM: CPT

## 2024-04-01 PROCEDURE — 84481 FREE ASSAY (FT-3): CPT

## 2024-04-01 PROCEDURE — 85025 COMPLETE CBC W/AUTO DIFF WBC: CPT

## 2024-04-01 PROCEDURE — 85014 HEMATOCRIT: CPT

## 2024-04-01 PROCEDURE — 1100000000 HC RM PRIVATE

## 2024-04-01 PROCEDURE — 92526 ORAL FUNCTION THERAPY: CPT

## 2024-04-01 PROCEDURE — 6360000002 HC RX W HCPCS: Performed by: HOSPITALIST

## 2024-04-01 PROCEDURE — 99222 1ST HOSP IP/OBS MODERATE 55: CPT

## 2024-04-01 RX ORDER — AMOXICILLIN 500 MG/1
500 CAPSULE ORAL EVERY 8 HOURS SCHEDULED
Status: COMPLETED | OUTPATIENT
Start: 2024-04-01 | End: 2024-04-04

## 2024-04-01 RX ORDER — MAGNESIUM SULFATE IN WATER 40 MG/ML
2000 INJECTION, SOLUTION INTRAVENOUS ONCE
Status: COMPLETED | OUTPATIENT
Start: 2024-04-01 | End: 2024-04-01

## 2024-04-01 RX ORDER — CINACALCET 30 MG/1
30 TABLET, FILM COATED ORAL DAILY
Status: DISCONTINUED | OUTPATIENT
Start: 2024-04-01 | End: 2024-04-05 | Stop reason: HOSPADM

## 2024-04-01 RX ORDER — LACTULOSE 10 G/15ML
20 SOLUTION ORAL DAILY
Status: DISCONTINUED | OUTPATIENT
Start: 2024-04-02 | End: 2024-04-03

## 2024-04-01 RX ORDER — METHIMAZOLE 5 MG/1
2.5 TABLET ORAL
Status: DISCONTINUED | OUTPATIENT
Start: 2024-04-02 | End: 2024-04-05 | Stop reason: HOSPADM

## 2024-04-01 RX ADMIN — SODIUM CHLORIDE: 9 INJECTION, SOLUTION INTRAVENOUS at 09:09

## 2024-04-01 RX ADMIN — AMOXICILLIN 500 MG: 500 CAPSULE ORAL at 21:03

## 2024-04-01 RX ADMIN — DORZOLAMIDE HYDROCHLORIDE AND TIMOLOL MALEATE 1 DROP: 20; 5 SOLUTION/ DROPS OPHTHALMIC at 08:54

## 2024-04-01 RX ADMIN — AMOXICILLIN 500 MG: 500 CAPSULE ORAL at 17:25

## 2024-04-01 RX ADMIN — PANTOPRAZOLE SODIUM 40 MG: 40 TABLET, DELAYED RELEASE ORAL at 08:33

## 2024-04-01 RX ADMIN — LATANOPROST 1 DROP: 50 SOLUTION OPHTHALMIC at 21:04

## 2024-04-01 RX ADMIN — HYDROCODONE BITARTRATE AND ACETAMINOPHEN 1 TABLET: 7.5; 325 TABLET ORAL at 10:02

## 2024-04-01 RX ADMIN — SODIUM CHLORIDE: 9 INJECTION, SOLUTION INTRAVENOUS at 09:11

## 2024-04-01 RX ADMIN — MORPHINE SULFATE 2 MG: 2 INJECTION, SOLUTION INTRAMUSCULAR; INTRAVENOUS at 23:54

## 2024-04-01 RX ADMIN — METOPROLOL TARTRATE 25 MG: 25 TABLET, FILM COATED ORAL at 12:03

## 2024-04-01 RX ADMIN — DOCUSATE SODIUM 50 MG AND SENNOSIDES 8.6 MG 2 TABLET: 8.6; 5 TABLET, FILM COATED ORAL at 08:37

## 2024-04-01 RX ADMIN — SODIUM CHLORIDE: 9 INJECTION, SOLUTION INTRAVENOUS at 04:17

## 2024-04-01 RX ADMIN — SODIUM CHLORIDE: 9 INJECTION, SOLUTION INTRAVENOUS at 15:02

## 2024-04-01 RX ADMIN — PANTOPRAZOLE SODIUM 40 MG: 40 TABLET, DELAYED RELEASE ORAL at 17:24

## 2024-04-01 RX ADMIN — SODIUM CHLORIDE: 9 INJECTION, SOLUTION INTRAVENOUS at 22:04

## 2024-04-01 RX ADMIN — MONTELUKAST 10 MG: 10 TABLET, FILM COATED ORAL at 21:03

## 2024-04-01 RX ADMIN — METOPROLOL TARTRATE 25 MG: 25 TABLET, FILM COATED ORAL at 21:03

## 2024-04-01 RX ADMIN — BRIMONIDINE TARTRATE 1 DROP: 2 SOLUTION/ DROPS OPHTHALMIC at 08:54

## 2024-04-01 RX ADMIN — METHIMAZOLE 2.5 MG: 5 TABLET ORAL at 08:35

## 2024-04-01 RX ADMIN — DORZOLAMIDE HYDROCHLORIDE AND TIMOLOL MALEATE 1 DROP: 20; 5 SOLUTION/ DROPS OPHTHALMIC at 21:04

## 2024-04-01 RX ADMIN — BRIMONIDINE TARTRATE 1 DROP: 2 SOLUTION/ DROPS OPHTHALMIC at 21:04

## 2024-04-01 RX ADMIN — SODIUM CHLORIDE: 9 INJECTION, SOLUTION INTRAVENOUS at 17:22

## 2024-04-01 RX ADMIN — POLYETHYLENE GLYCOL 3350 17 G: 17 POWDER, FOR SOLUTION ORAL at 21:03

## 2024-04-01 RX ADMIN — POLYETHYLENE GLYCOL 3350 17 G: 17 POWDER, FOR SOLUTION ORAL at 08:32

## 2024-04-01 RX ADMIN — MAGNESIUM SULFATE HEPTAHYDRATE 2000 MG: 40 INJECTION, SOLUTION INTRAVENOUS at 09:10

## 2024-04-01 RX ADMIN — DOCUSATE SODIUM 50 MG AND SENNOSIDES 8.6 MG 2 TABLET: 8.6; 5 TABLET, FILM COATED ORAL at 21:03

## 2024-04-01 RX ADMIN — HYDROCODONE BITARTRATE AND ACETAMINOPHEN 1 TABLET: 7.5; 325 TABLET ORAL at 17:22

## 2024-04-01 RX ADMIN — CINACALCET HYDROCHLORIDE 30 MG: 30 TABLET, FILM COATED ORAL at 12:03

## 2024-04-01 RX ADMIN — SODIUM CHLORIDE, PRESERVATIVE FREE 10 ML: 5 INJECTION INTRAVENOUS at 21:05

## 2024-04-01 RX ADMIN — LOSARTAN POTASSIUM 50 MG: 50 TABLET, FILM COATED ORAL at 08:34

## 2024-04-01 ASSESSMENT — PAIN SCALES - WONG BAKER: WONGBAKER_NUMERICALRESPONSE: NO HURT

## 2024-04-01 ASSESSMENT — PAIN SCALES - GENERAL
PAINLEVEL_OUTOF10: 0
PAINLEVEL_OUTOF10: 7
PAINLEVEL_OUTOF10: 6
PAINLEVEL_OUTOF10: 6
PAINLEVEL_OUTOF10: 0
PAINLEVEL_OUTOF10: 0

## 2024-04-01 ASSESSMENT — PAIN DESCRIPTION - LOCATION: LOCATION: GENERALIZED;BACK;HIP;LEG

## 2024-04-01 ASSESSMENT — PAIN DESCRIPTION - DESCRIPTORS: DESCRIPTORS: DISCOMFORT;ACHING

## 2024-04-01 ASSESSMENT — PAIN DESCRIPTION - ORIENTATION: ORIENTATION: LEFT;RIGHT;POSTERIOR

## 2024-04-01 NOTE — CONSULTS
This  is likely a result of the patient's pulmonary emboli..       Xray Result (most recent):  XR KNEE BILATERAL STANDARD 03/31/2024    Narrative  Exam: XR KNEE BILATERAL STANDARD on 3/31/2024 5:04 PM    Clinical History: The Female patient is 89 years old  presenting for bilateral  pain and swelling.    Comparison:  none    Findings:    4 views of the right knee and 3 views of the left knee were obtained.    Right:    No fracture or dislocation is identified.    Severe tricompartmental degenerative changes    Left:    No fracture or dislocation is identified.    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           Condition on Admission: Good/Fair        Assessment and Plan:         //Gait and Self-care deficits secondary to debility  //Failure to thrive  //Decreased independence with ADLs  -Possibly close to baseline??  //Hyperalgesia  -Noted in bilateral lower extremities.  Can trial gabapentin versus Lyrica if lower extremity dysesthesias contributing to functional loss  //Gait instability  -Denied for inpatient rehab services at this time   -Patient DOES NOT currently exhibit the ability to tolerate active participation in at least three hours of therapy services per day, five days a week to include Physical Therapy and Occupational Therapy in addition to SLP if indicated. Furthermore, patient without significant underlying medical problems, and is currently low risk for decompensation and therefore does not require continued close physician monitoring or 24-hour rehabilitation nursing care. Current functional and medical status can be treated and monitored at a home versus lower level care facility. Patient does not require acute rehabilitation setting at this time.  -Patient is currently too low level to be able to  participate in 3-4 hours of therapy a day.   -If patient still requires physician oversight at time of discharge, patient would benefit from a less intense therapy schedule over a prolonged duration of time in a setting such as a SNF.  -Thank you for this consult. PM&R will sign off at this time          Code status: Full Code     Pura Otto D.O. PM&R  Inpatient Rehabilitation Medical Director    April 1, 2024

## 2024-04-01 NOTE — PROGRESS NOTES
GOALS:  LTG: Patient will maintain adequate hydration/nutrition with optimum safety and efficiency of swallowing function with PO intake without overt signs or symptoms of aspiration for the highest appropriate diet level.  STG: GOALS MET 24  Patient will consume soft and bite-sized textures and thin liquids without overt signs or symptoms of airway compromise.  Patient will safely ingest diet trials during therapeutic feedings with SLP without overt signs or symptoms of respiratory compromise in efforts to advance diet.    LTG: Patient will increase neuro-linguistic abilities to increase safety and awareness in functional living environment ADDED 24  STG: Patient will complete reasoning tasks to improve problem solving and safety awareness with 80% accuracy given mod cueing  STG: Patient will demonstrate attention by attending salient task details with 80% accuracy given mod cueing  STG: Patient/caregiver will utilize memory compensatory strategies to improve recall of functional list/message with 80% accuracy given mod assistance  STG: Patient/caregiver will utilize re-orientation compensatory strategies to improve awareness of temporal/locational information with 80% accuracy given moderate assistance.     SPEECH LANGUAGE PATHOLOGY: COGNITIVE-COMMUNICATION Initial Assessment and DYSPHAGIA Daily Note #1    Acknowledge Order  I  Therapy Time  I   Charges     I  Rehab Caseload Tracker      NAME: Lilliam Song  : 1934  MRN: [id number]    ADMISSION DATE: 3/28/2024  PRIMARY DIAGNOSIS: Hypercalcemia    ICD-10: Treatment Diagnosis: R13.12 Dysphagia, Oropharyngeal Phase    RECOMMENDATIONS   Diet:    Soft and Bite-Sized  Thin Liquids    Medication: as tolerated   Compensatory Swallowing Strategies:   Alternate solids and liquids  Slow rate of intake  Small bites/sips  Upright as possible for all oral intake  Cognitive-Communication Strategies  Utilize compensatory strategies (orientation, memory,

## 2024-04-01 NOTE — CONSULTS
Comprehensive Nutrition Assessment    Type and Reason for Visit: Reassess, Consult  Malnutrition Screening Tool: Malnutrition Screen  Have you recently lost weight without trying?: 24 to 33 pounds (3 points)  Have you been eating poorly because of a decreased appetite?: Yes (1 point)  Malnutrition Screening Tool Score: 4    Nutrition Recommendations/Plan:   Meals and Snacks:  Diet: Continue current order  Prefs updated  Nutrition Supplement Therapy:  Medical food supplement therapy:  Continue Ensure Enlive three times per day (this provides 350 kcal and 20 grams protein per bottle) prefers strawberry     Malnutrition Assessment:  Malnutrition Status: Moderate malnutrition  Context: Chronic Illness  Findings of clinical characteristics of malnutrition:   Energy Intake:  Mild decrease in energy intake (Comment) (intake declining since last admission)  Weight Loss:  Mild weight loss (specify amount and time period) (12% loss over 1 year per IM office records)     Body Fat Loss:  Mild body fat loss Orbital, Buccal region   Muscle Mass Loss:  Mild muscle mass loss Temples (temporalis), Hand (interosseous)     Nutrition Assessment:  Nutrition History:   3/11 RD note: Daughter provides majority of nutrition hx. Reports that her intake has been low since late February. States that in the 5-6 days prior to admission this declined further and she was taking in little to no PO, including fluids. At baseline, she takes two Ensure Max per day, snacks throughout the day, and then has one larger meal in the evening.    Dtr reports she ate some at rehab, since being home intake has primarily been ensure and water, occasionally takes some fruit or sweet potato.  Recent home intake of 2 ensure and 48 ounces of water.       Do You Have Any Cultural, Confucianist, or Ethnic Food Preferences?: No   Weight History:   Inpt wt hx as follows:  155# estimated 3/19/24, 3/28/24 155# stated and 167# bed scale.    IM office records: 143# 3/7/24,  illness related to  (declining oral intake) as evidenced by Criteria as identified in malnutrition assessment  Nutrition Interventions:   Food and/or Nutrient Delivery: Continue Current Diet, Continue Oral Nutrition Supplement     Coordination of Nutrition Care: Interdisciplinary Rounds      Goals:   Previous Goal Met: Progressing toward Goal(s)  Active Goal:  (Meet >50% estimated needs by RD f/u)       Nutrition Monitoring and Evaluation:      Food/Nutrient Intake Outcomes: Food and Nutrient Intake, Supplement Intake  Physical Signs/Symptoms Outcomes: Biochemical Data, GI Status, Meal Time Behavior, Weight    Discharge Planning:    Too soon to determine    RUFINO BARNETT, RD

## 2024-04-01 NOTE — PLAN OF CARE
Problem: Discharge Planning  Goal: Discharge to home or other facility with appropriate resources  4/1/2024 0713 by Erika Bhatti RN  Outcome: Progressing  3/31/2024 2259 by Ceci Ríos RN  Outcome: Progressing  Flowsheets (Taken 3/31/2024 1905)  Discharge to home or other facility with appropriate resources:   Identify barriers to discharge with patient and caregiver   Arrange for needed discharge resources and transportation as appropriate   Identify discharge learning needs (meds, wound care, etc)     Problem: Pain  Goal: Verbalizes/displays adequate comfort level or baseline comfort level  4/1/2024 0713 by Erika Bhatti RN  Outcome: Progressing  3/31/2024 2259 by Ceci Ríos RN  Outcome: Progressing     Problem: Skin/Tissue Integrity  Goal: Absence of new skin breakdown  Description: 1.  Monitor for areas of redness and/or skin breakdown  2.  Assess vascular access sites hourly  3.  Every 4-6 hours minimum:  Change oxygen saturation probe site  4.  Every 4-6 hours:  If on nasal continuous positive airway pressure, respiratory therapy assess nares and determine need for appliance change or resting period.  4/1/2024 0713 by Erika Bhatti RN  Outcome: Progressing  3/31/2024 2259 by Ceci Ríos RN  Outcome: Progressing     Problem: Safety - Adult  Goal: Free from fall injury  4/1/2024 0713 by Erika Bhatti RN  Outcome: Progressing  3/31/2024 2259 by Ceci Ríos RN  Outcome: Progressing     Problem: ABCDS Injury Assessment  Goal: Absence of physical injury  4/1/2024 0713 by Erika Bhatti RN  Outcome: Progressing  3/31/2024 2259 by Ceci Ríos RN  Outcome: Progressing     Problem: Chronic Conditions and Co-morbidities  Goal: Patient's chronic conditions and co-morbidity symptoms are monitored and maintained or improved  4/1/2024 0713 by Erika Bhatti RN  Outcome: Progressing  3/31/2024 2259 by Ceci Ríos RN  Outcome: Progressing  Flowsheets (Taken 3/31/2024 1905)  Care Plan - Patient's Chronic Conditions and

## 2024-04-01 NOTE — PROGRESS NOTES
Pt c/o pain throughout shift. Medicated per MAR.         Rounds performed throughout shift. Pt denies needs at this time. Bed in low position, locked and call light/personal items within reach. Will report to night shift nurse.

## 2024-04-01 NOTE — PROGRESS NOTES
Pt has an open spot on sacrum that appears to be worse than on Friday 3/29. Pts daughter stated \" I refuse to let them turn patient at night when she is sleeping\". Will order a wedge to help keep patient turned q 2 hours.

## 2024-04-01 NOTE — CONSULTS
Palliative Care    Patient: Lilliam Song MRN: [id number]  SSN: [id number]    YOB: 1934  Age: 89 y.o.  Sex: female       Date of Request: 3/29/2024  Date of Consult:  4/1/2024  Reason for Consult:  goals of care  Requesting Physician: Dr. Dhaliwal     Assessment/Plan:     Principal Diagnosis:    Failure to Thrive  R62.7    Additional Diagnoses:   Encounter for Palliative Care  Z51.5    Palliative Performance Scale (PPS):   40%    Medical Decision Making:   Reviewed and summarized recent labs, notes, and imaging. Pt seen in bed with her daughter, Allie, at bedside. Pt states her goal is to get home. Prior to this admission, she was at home with [facility], which she would like to return to. She and her daughter were not satisfied with the care given at [facility]. Pt has HCPOA paperwork, though the one we have on file is not a HCPOA, but a durable POA for financial management. Reportedly, pt's HCPOA paperwork names her daughter Allie as her decision maker. We discussed code status, which Allie states is \"not a DNR.\" She reports her mother wants to be resuscitated, but she does not want to live on a machine long-term. Allie also has brothers in [place]. She is remaining optimistic in the hope that her mother will be able to regain strength in order to return home. We briefly discussed the possibility of hospice were pt not to regain strength, but for now, pt's daughter would like to continue treatment in hopes of bringing pt home at discharge.    Will discuss findings with members of the interdisciplinary team.      Thank you for this referral.       More than 50% of this 55 minute visit was spent counseling and coordination of care as outlined above.    Subjective:     History obtained from:  Patient, Family, and Chart    Chief Complaint: Fatigue  History of Present Illness: Lilliam Song is a 89 y.o. female with a PMH of hyperparathyroidism (on chronic Cinacalcet per endocrinology at  m (5' 5\")   Wt 76.1 kg (167 lb 11.2 oz)   SpO2 98%   BMI 27.91 kg/m²        Physical Exam:    General:  Cooperative. No acute distress.   Eyes:  Conjunctivae/corneas clear    Nose: Nares normal. Septum midline.   Neck: Supple, symmetrical, trachea midline, no JVD   Lungs:   Unlabored on RA   Heart:  Regular rate and rhythm, HTN   Abdomen:   Soft, non-tender, non-distended   Extremities: Normal, atraumatic, no cyanosis or edema   Skin: Skin color, texture, turgor normal. No rash or lesions.   Neurologic: Nonfocal   Psych: Alert and oriented      Assessment:         Signed By: Kim Todd, APRN - NP     April 1, 2024

## 2024-04-01 NOTE — CARE COORDINATION
Pt chart reviewed. Pt is not ready for discharge. MSW received message to fax order for hospital bed with MD signature to [facility]. MSW had Dr. Dhaliwal to sign form and fax to [facility] for processing. No needs expressed at this time for case management. Discharge to be determined. CM will follow patient for care.

## 2024-04-01 NOTE — PROGRESS NOTES
Hospitalist Progress Note   Admit Date:  3/28/2024 12:08 PM   Name:  Lilliam Song   Age:  89 y.o.  Sex:  female  :  1934   MRN:  [id number]   Room:  [facility]/    Presenting/Chief Complaint: OTHER     Reason(s) for Admission: Hypercalcemia [E83.52]  Hyperparathyroidism (HCC) [E21.3]  Anemia, unspecified type [D64.9]     Hospital Course:   AS Per prior documentation:    \"Lilliam Song is a 89 y.o. female with medical history of hyperparathyroidism (on chronic Cinacalcet per endocrinology that she follows with at [place]), mulinodular goiter, HTN, HLD, PVD, and low-grade papillary tumors of the bladder (recommendation for TURBT but patient and family wishes to opt for monitoring) presents at the request of her PCP, Dr. Heredia, due to worsening fatigue, failure to thrive, and constipation. She was recently hospitalized and discharged from [place] after being diagnosed and started on Eliquis for a pulmonary embolism. She was discharged to SNF but daughter took her home as she was not having a good experience. Since that time, she has essentially been in bed and not able to get up more than once or twice to the recliner. Additionally, she has had constipation for \"weeks\" and has only been able to eat \"half a cup of grits\". She also \"hurts all over, everywhere.\"      ED course: ionized calcium of 5.88. Bili of 2.7. Hgb of 8.5. CXR negative. Hospitalist consulted for admission. \"    Subjective & 24hr Events:   Pt seen and evaluated.  Daughter @ bedside  all ?'s answered   C/o pain all over - especially to the knees and feet   Calcium still elevated   S/p 1 unit of prbcs for HB of 6.2- no signs of active bleed- likely dilutional- will monitor  On Rocephin for UTI- cultures - urine growing gram positive cocci    Assessment & Plan:     Principal Problem:    Hypercalcemia   Hx of hyperparathyroidism   Restart agressive ivfls  Zolendric acid x 1   Check bmp     Active Problems:    UTI  Continue rocephin  F/up

## 2024-04-02 LAB
ANION GAP SERPL CALC-SCNC: 6 MMOL/L (ref 2–11)
BASOPHILS # BLD: 0 K/UL (ref 0–0.2)
BASOPHILS NFR BLD: 0 % (ref 0–2)
BUN SERPL-MCNC: 6 MG/DL (ref 8–23)
CALCIUM SERPL-MCNC: 9.7 MG/DL (ref 8.3–10.4)
CHLORIDE SERPL-SCNC: 111 MMOL/L (ref 103–113)
CO2 SERPL-SCNC: 24 MMOL/L (ref 21–32)
CREAT SERPL-MCNC: 0.4 MG/DL (ref 0.6–1)
DIFFERENTIAL METHOD BLD: ABNORMAL
EOSINOPHIL # BLD: 0.2 K/UL (ref 0–0.8)
EOSINOPHIL NFR BLD: 3 % (ref 0.5–7.8)
ERYTHROCYTE [DISTWIDTH] IN BLOOD BY AUTOMATED COUNT: 18.5 % (ref 11.9–14.6)
GLUCOSE SERPL-MCNC: 87 MG/DL (ref 65–100)
HCT VFR BLD AUTO: 23.8 % (ref 35.8–46.3)
HCT VFR BLD AUTO: 24.7 % (ref 35.8–46.3)
HCT VFR BLD AUTO: 26.9 % (ref 35.8–46.3)
HCT VFR BLD AUTO: 27.3 % (ref 35.8–46.3)
HGB BLD-MCNC: 7.4 G/DL (ref 11.7–15.4)
HGB BLD-MCNC: 7.6 G/DL (ref 11.7–15.4)
HGB BLD-MCNC: 8.5 G/DL (ref 11.7–15.4)
HGB BLD-MCNC: 8.5 G/DL (ref 11.7–15.4)
IMM GRANULOCYTES # BLD AUTO: 0.1 K/UL (ref 0–0.5)
IMM GRANULOCYTES NFR BLD AUTO: 2 % (ref 0–5)
LYMPHOCYTES # BLD: 0.8 K/UL (ref 0.5–4.6)
LYMPHOCYTES NFR BLD: 14 % (ref 13–44)
MAGNESIUM SERPL-MCNC: 1.7 MG/DL (ref 1.8–2.4)
MCH RBC QN AUTO: 31.6 PG (ref 26.1–32.9)
MCHC RBC AUTO-ENTMCNC: 31.1 G/DL (ref 31.4–35)
MCV RBC AUTO: 101.7 FL (ref 82–102)
MONOCYTES # BLD: 0.5 K/UL (ref 0.1–1.3)
MONOCYTES NFR BLD: 9 % (ref 4–12)
NEUTS SEG # BLD: 4 K/UL (ref 1.7–8.2)
NEUTS SEG NFR BLD: 72 % (ref 43–78)
NRBC # BLD: 0.03 K/UL (ref 0–0.2)
PLATELET # BLD AUTO: 273 K/UL (ref 150–450)
PMV BLD AUTO: 10.2 FL (ref 9.4–12.3)
POTASSIUM SERPL-SCNC: 3.3 MMOL/L (ref 3.5–5.1)
RBC # BLD AUTO: 2.34 M/UL (ref 4.05–5.2)
SODIUM SERPL-SCNC: 141 MMOL/L (ref 136–146)
WBC # BLD AUTO: 5.5 K/UL (ref 4.3–11.1)

## 2024-04-02 PROCEDURE — 92507 TX SP LANG VOICE COMM INDIV: CPT

## 2024-04-02 PROCEDURE — 85014 HEMATOCRIT: CPT

## 2024-04-02 PROCEDURE — 1100000000 HC RM PRIVATE

## 2024-04-02 PROCEDURE — 97530 THERAPEUTIC ACTIVITIES: CPT

## 2024-04-02 PROCEDURE — 85025 COMPLETE CBC W/AUTO DIFF WBC: CPT

## 2024-04-02 PROCEDURE — 6370000000 HC RX 637 (ALT 250 FOR IP): Performed by: HOSPITALIST

## 2024-04-02 PROCEDURE — 85018 HEMOGLOBIN: CPT

## 2024-04-02 PROCEDURE — 6360000002 HC RX W HCPCS: Performed by: HOSPITALIST

## 2024-04-02 PROCEDURE — 97161 PT EVAL LOW COMPLEX 20 MIN: CPT

## 2024-04-02 PROCEDURE — 36415 COLL VENOUS BLD VENIPUNCTURE: CPT

## 2024-04-02 PROCEDURE — 6360000002 HC RX W HCPCS: Performed by: INTERNAL MEDICINE

## 2024-04-02 PROCEDURE — 97112 NEUROMUSCULAR REEDUCATION: CPT

## 2024-04-02 PROCEDURE — 6370000000 HC RX 637 (ALT 250 FOR IP): Performed by: INTERNAL MEDICINE

## 2024-04-02 PROCEDURE — 83735 ASSAY OF MAGNESIUM: CPT

## 2024-04-02 PROCEDURE — 2580000003 HC RX 258: Performed by: INTERNAL MEDICINE

## 2024-04-02 PROCEDURE — 80048 BASIC METABOLIC PNL TOTAL CA: CPT

## 2024-04-02 PROCEDURE — 97166 OT EVAL MOD COMPLEX 45 MIN: CPT

## 2024-04-02 RX ORDER — POTASSIUM CHLORIDE 7.45 MG/ML
10 INJECTION INTRAVENOUS
Status: COMPLETED | OUTPATIENT
Start: 2024-04-02 | End: 2024-04-02

## 2024-04-02 RX ORDER — MAGNESIUM SULFATE IN WATER 40 MG/ML
2000 INJECTION, SOLUTION INTRAVENOUS ONCE
Status: COMPLETED | OUTPATIENT
Start: 2024-04-02 | End: 2024-04-02

## 2024-04-02 RX ADMIN — PANTOPRAZOLE SODIUM 40 MG: 40 TABLET, DELAYED RELEASE ORAL at 15:15

## 2024-04-02 RX ADMIN — SODIUM CHLORIDE: 9 INJECTION, SOLUTION INTRAVENOUS at 16:05

## 2024-04-02 RX ADMIN — SODIUM CHLORIDE: 9 INJECTION, SOLUTION INTRAVENOUS at 03:57

## 2024-04-02 RX ADMIN — MORPHINE SULFATE 2 MG: 2 INJECTION, SOLUTION INTRAMUSCULAR; INTRAVENOUS at 06:08

## 2024-04-02 RX ADMIN — DOCUSATE SODIUM 50 MG AND SENNOSIDES 8.6 MG 2 TABLET: 8.6; 5 TABLET, FILM COATED ORAL at 08:30

## 2024-04-02 RX ADMIN — AMOXICILLIN 500 MG: 500 CAPSULE ORAL at 13:32

## 2024-04-02 RX ADMIN — CINACALCET HYDROCHLORIDE 30 MG: 30 TABLET, FILM COATED ORAL at 08:30

## 2024-04-02 RX ADMIN — DORZOLAMIDE HYDROCHLORIDE AND TIMOLOL MALEATE 1 DROP: 20; 5 SOLUTION/ DROPS OPHTHALMIC at 21:20

## 2024-04-02 RX ADMIN — BRIMONIDINE TARTRATE 1 DROP: 2 SOLUTION/ DROPS OPHTHALMIC at 08:59

## 2024-04-02 RX ADMIN — POTASSIUM CHLORIDE 10 MEQ: 7.46 INJECTION, SOLUTION INTRAVENOUS at 18:37

## 2024-04-02 RX ADMIN — BRIMONIDINE TARTRATE 1 DROP: 2 SOLUTION/ DROPS OPHTHALMIC at 21:17

## 2024-04-02 RX ADMIN — POTASSIUM CHLORIDE 10 MEQ: 7.46 INJECTION, SOLUTION INTRAVENOUS at 13:39

## 2024-04-02 RX ADMIN — PANTOPRAZOLE SODIUM 40 MG: 40 TABLET, DELAYED RELEASE ORAL at 06:09

## 2024-04-02 RX ADMIN — DORZOLAMIDE HYDROCHLORIDE AND TIMOLOL MALEATE 1 DROP: 20; 5 SOLUTION/ DROPS OPHTHALMIC at 08:59

## 2024-04-02 RX ADMIN — SODIUM CHLORIDE, PRESERVATIVE FREE 10 ML: 5 INJECTION INTRAVENOUS at 21:15

## 2024-04-02 RX ADMIN — APIXABAN 5 MG: 5 TABLET, FILM COATED ORAL at 21:15

## 2024-04-02 RX ADMIN — POLYETHYLENE GLYCOL 3350 17 G: 17 POWDER, FOR SOLUTION ORAL at 21:14

## 2024-04-02 RX ADMIN — DOCUSATE SODIUM 50 MG AND SENNOSIDES 8.6 MG 2 TABLET: 8.6; 5 TABLET, FILM COATED ORAL at 21:14

## 2024-04-02 RX ADMIN — LACTULOSE 20 G: 10 SOLUTION ORAL at 08:30

## 2024-04-02 RX ADMIN — POTASSIUM BICARBONATE 40 MEQ: 782 TABLET, EFFERVESCENT ORAL at 11:59

## 2024-04-02 RX ADMIN — MONTELUKAST 10 MG: 10 TABLET, FILM COATED ORAL at 21:15

## 2024-04-02 RX ADMIN — SODIUM CHLORIDE: 9 INJECTION, SOLUTION INTRAVENOUS at 16:10

## 2024-04-02 RX ADMIN — METOPROLOL TARTRATE 25 MG: 25 TABLET, FILM COATED ORAL at 08:41

## 2024-04-02 RX ADMIN — METOPROLOL TARTRATE 25 MG: 25 TABLET, FILM COATED ORAL at 21:15

## 2024-04-02 RX ADMIN — AMOXICILLIN 500 MG: 500 CAPSULE ORAL at 21:15

## 2024-04-02 RX ADMIN — HYDROCODONE BITARTRATE AND ACETAMINOPHEN 1 TABLET: 7.5; 325 TABLET ORAL at 02:00

## 2024-04-02 RX ADMIN — SODIUM CHLORIDE: 9 INJECTION, SOLUTION INTRAVENOUS at 09:17

## 2024-04-02 RX ADMIN — POTASSIUM CHLORIDE 10 MEQ: 7.46 INJECTION, SOLUTION INTRAVENOUS at 15:21

## 2024-04-02 RX ADMIN — METHIMAZOLE 2.5 MG: 5 TABLET ORAL at 08:30

## 2024-04-02 RX ADMIN — LATANOPROST 1 DROP: 50 SOLUTION OPHTHALMIC at 21:22

## 2024-04-02 RX ADMIN — LOSARTAN POTASSIUM 50 MG: 50 TABLET, FILM COATED ORAL at 08:41

## 2024-04-02 RX ADMIN — POTASSIUM CHLORIDE 10 MEQ: 7.46 INJECTION, SOLUTION INTRAVENOUS at 16:59

## 2024-04-02 RX ADMIN — AMOXICILLIN 500 MG: 500 CAPSULE ORAL at 06:09

## 2024-04-02 RX ADMIN — MAGNESIUM SULFATE HEPTAHYDRATE 2000 MG: 40 INJECTION, SOLUTION INTRAVENOUS at 16:09

## 2024-04-02 RX ADMIN — POLYETHYLENE GLYCOL 3350 17 G: 17 POWDER, FOR SOLUTION ORAL at 08:28

## 2024-04-02 RX ADMIN — MORPHINE SULFATE 2 MG: 2 INJECTION, SOLUTION INTRAMUSCULAR; INTRAVENOUS at 16:00

## 2024-04-02 ASSESSMENT — PAIN SCALES - GENERAL
PAINLEVEL_OUTOF10: 10
PAINLEVEL_OUTOF10: 0
PAINLEVEL_OUTOF10: 0
PAINLEVEL_OUTOF10: 6
PAINLEVEL_OUTOF10: 0
PAINLEVEL_OUTOF10: 0
PAINLEVEL_OUTOF10: 7

## 2024-04-02 ASSESSMENT — PAIN DESCRIPTION - LOCATION
LOCATION: GENERALIZED;BACK;KNEE
LOCATION: GENERALIZED;LEG;FOOT
LOCATION: GENERALIZED;KNEE

## 2024-04-02 ASSESSMENT — PAIN DESCRIPTION - DESCRIPTORS
DESCRIPTORS: DISCOMFORT
DESCRIPTORS: DISCOMFORT

## 2024-04-02 NOTE — PROGRESS NOTES
Hourly rounds performed this shift. Bed lowered and locked. Call light within reach. PRN pain medication given per MAR, pt satisfied. New Allevyn placed on sacral area with zinc paste. Pt turned Q2 and heels elevated. Bedside report will be given to oncoming nurse.

## 2024-04-02 NOTE — PROGRESS NOTES
Standard  Bathroom Equipment: None  Bathroom Accessibility: Accessible  Home Equipment: Wheelchair-manual  Receives Help From: Family  ADL Assistance: Needs assistance  Bath: Moderate assistance  Dressing: Moderate assistance  Grooming: Moderate assistance  Feeding: Moderate assistance  Toileting: Needs assistance  Homemaking Assistance: Needs assistance  Ambulation Assistance: Needs assistance  Transfer Assistance: Needs assistance  Active : No  Occupation: Retired    OBJECTIVE:     LINES / DRAINS / AIRWAY: External Catheter and IV    RESTRICTIONS/PRECAUTIONS:  Restrictions/Precautions: Fall Risk, Bed Alarm    PAIN: VITALS / O2:   Pre Treatment:    Reports significant pain in the knees but not able to rate pain level      Post Treatment: same       Vitals          Oxygen            GROSS EVALUATION: INTACT IMPAIRED   (See Comments)   UE AROM [] []Generally decreased    UE PROM [] []   Strength []  Poor strength in all extremities      Posture / Balance []  Fair to fair- sitting edge of bed; poor standing    Sensation []     Coordination []  Generally slowed      Tone []       Edema []    Activity Tolerance []  Limited by pain, fatigue, weakness      Hand Dominance R [] L []      COGNITION/  PERCEPTION: INTACT IMPAIRED   (See Comments)   Orientation []  Oriented to self/place   Vision []  Appears appropriate    Hearing []  Hard of hearing ; hearing aids    Cognition  []  Cues for initiating movement and for safety    Perception []       MOBILITY: I Mod I S SBA CGA Min Mod Max Total  NT x2 Comments:   Bed Mobility    Rolling [] [] [] [] [] [] [] [x] [x] [] [x]    Supine to Sit [] [] [] [] [] [] [] [x] [x] [] [x]    Scooting [] [] [] [] [] [] [] [x] [x] [] [x]    Sit to Supine [] [] [] [] [] [] [] [] [] [x] []    Transfers    Sit to Stand [] [] [] [] [] [] [] [x] [x] [] [x]    Bed to Chair [] [] [] [] [] [] [] [x] [x] [] [x] Sliding transfer to the chair    Stand to Sit [] [] [] [] [] [] [] [x] [x] [] [x]     Tub/Shower [] [] [] [] [] [] [] [] [] [x] []     Toilet [] [] [] [] [] [] [] [] [] [x] []      [] [] [] [] [] [] [] [] [] [] []    I=Independent, Mod I=Modified Independent, S=Supervision/Setup, SBA=Standby Assistance, CGA=Contact Guard Assistance, Min=Minimal Assistance, Mod=Moderate Assistance, Max=Maximal Assistance, Total=Total Assistance, NT=Not Tested    ACTIVITIES OF DAILY LIVING: I Mod I S SBA CGA Min Mod Max Total NT Comments   BASIC ADLs:              Upper Body Bathing  [] [] [] [] [] [] [] [] [] [x]     Lower Body Bathing [] [] [] [] [] [] [] [] [] [x]     Toileting [] [] [] [] [] [] [] [] [] [x]    Upper Body Dressing [] [] [] [] [] [] [] [] [] [x]    Lower Body Dressing [] [] [] [] [] [] [] [] [] [x]    Feeding [] [] [] [] [] [] [] [] [] [x]    Grooming [] [] [] [] [] [] [] [] [] [x]    Personal Device Care [] [] [] [] [] [] [] [] [] [x]    Functional Mobility [] [] [] [] [] [] [] [x] [x] [] X 2 bed mobility, standing, and sliding transfer to the chair    I=Independent, Mod I=Modified Independent, S=Supervision/Setup, SBA=Standby Assistance, CGA=Contact Guard Assistance, Min=Minimal Assistance, Mod=Moderate Assistance, Max=Maximal Assistance, Total=Total Assistance, NT=Not Tested    PLAN:   FREQUENCY/DURATION   OT Plan of Care: 3 times/week for duration of hospital stay or until stated goals are met, whichever comes first.    PROBLEM LIST:   (Skilled intervention is medically necessary to address:)  Decreased ADL/Functional Activities  Decreased Activity Tolerance  Decreased AROM/PROM  Decreased Balance  Decreased Cognition  Decreased Coordination  Decreased Gait Ability  Decreased Safety Awareness  Decreased Strength  Decreased Transfer Abilities  Increased Pain   INTERVENTIONS PLANNED:  (Benefits and precautions of occupational therapy have been discussed with the patient.)  Self Care Training  Therapeutic Activity  Therapeutic Exercise/HEP  Neuromuscular Re-education  Manual Therapy  Education

## 2024-04-02 NOTE — PROGRESS NOTES
Hospitalist Progress Note   Admit Date:  3/28/2024 12:08 PM   Name:  Lilliam Song   Age:  89 y.o.  Sex:  female  :  1934   MRN:  [id number]   Room:  [facility]/    Presenting/Chief Complaint: OTHER     Reason(s) for Admission: Hypercalcemia [E83.52]  Hyperparathyroidism (HCC) [E21.3]  Anemia, unspecified type [D64.9]     Hospital Course:   AS Per prior documentation:    \"Lilliam Song is a 89 y.o. female with medical history of hyperparathyroidism (on chronic Cinacalcet per endocrinology that she follows with at [place]), mulinodular goiter, HTN, HLD, PVD, and low-grade papillary tumors of the bladder (recommendation for TURBT but patient and family wishes to opt for monitoring) presents at the request of her PCP, Dr. Heredia, due to worsening fatigue, failure to thrive, and constipation. She was recently hospitalized and discharged from [place] after being diagnosed and started on Eliquis for a pulmonary embolism. She was discharged to SNF but daughter took her home as she was not having a good experience. Since that time, she has essentially been in bed and not able to get up more than once or twice to the recliner. Additionally, she has had constipation for \"weeks\" and has only been able to eat \"half a cup of grits\". She also \"hurts all over, everywhere.\"      ED course: ionized calcium of 5.88. Bili of 2.7. Hgb of 8.5. CXR negative. Hospitalist consulted for admission. \"    Subjective & 24hr Events:   Pt seen and evaluated.  Daughter @ bedside  all ?'s answered   C/o pain all over - especially to the knees and feet   Calcium still elevated   S/p 1 unit of prbcs for HB of 6.2- no signs of active bleed- likely dilutional- will monitor  On Rocephin for UTI- cultures - urine growing gram positive cocci    Assessment & Plan:     Principal Problem:    Hypercalcemia   Hx of hyperparathyroidism   Resolving s/p Zolendric acid x 1   Decrease ivlfs    Active Problems:    UTI  Continue rocephin  F/up cultures

## 2024-04-02 NOTE — PROGRESS NOTES
GOALS:  LTG: Patient will maintain adequate hydration/nutrition with optimum safety and efficiency of swallowing function with PO intake without overt signs or symptoms of aspiration for the highest appropriate diet level.  STG: GOALS MET 24  Patient will consume soft and bite-sized textures and thin liquids without overt signs or symptoms of airway compromise.  Patient will safely ingest diet trials during therapeutic feedings with SLP without overt signs or symptoms of respiratory compromise in efforts to advance diet.    LTG: Patient will increase neuro-linguistic abilities to increase safety and awareness in functional living environment ADDED 24  STG: Patient will complete reasoning tasks to improve problem solving and safety awareness with 80% accuracy given mod cueing  STG: Patient will demonstrate attention by attending salient task details with 80% accuracy given mod cueing PROGRESSING 24  STG: Patient/caregiver will utilize memory compensatory strategies to improve recall of functional list/message with 80% accuracy given mod assistance PROGRESSING 24  STG: Patient/caregiver will utilize re-orientation compensatory strategies to improve awareness of temporal/locational information with 80% accuracy given moderate assistance. PROGRESSING 24    SPEECH LANGUAGE PATHOLOGY: COGNITIVE-COMMUNICATION Daily Note #2  Acknowledge Order  I  Therapy Time  I   Charges     I  Rehab Caseload Tracker      NAME: Lilliam Song  : 1934  MRN: [id number]    ADMISSION DATE: 3/28/2024  PRIMARY DIAGNOSIS: Hypercalcemia    ICD-10: Treatment Diagnosis: R13.12 Dysphagia, Oropharyngeal Phase    RECOMMENDATIONS   Diet:    Soft and Bite-Sized  Thin Liquids    Medication: as tolerated   Compensatory Swallowing Strategies:   Slow rate of intake  Small bites/sips  Upright as possible for all oral intake  Cognitive-Communication Strategies  Utilize compensatory strategies (orientation, memory, etc.)  Provide  request of her PCP, Dr. Heredia, due to worsening fatigue, failure to thrive, and constipation. She was recently hospitalized and discharged from [place] after being diagnosed and started on Eliquis for a pulmonary embolism. She was discharged to SNF but daughter took her home as she was not having a good experience. Since that time, she has essentially been in bed and not able to get up more than once or twice to the recliner. Additionally, she has had constipation for \"weeks\" and has only been able to eat \"half a cup of grits\". She also \"hurts all over, everywhere.\" CXR negative.    History of Present Injury/Illness: Ms. Song  has a past medical history of Abnormality of gait, Allergic rhinitis, Asymptomatic bilateral carotid artery stenosis, Cerebrovascular disease, Essential hypertension, benign, GI bleed, Glaucoma, Hx of seasonal allergies, Hyperparathyroidism (HCC), Occlusion and stenosis of carotid artery without mention of cerebral infarction, Osteoporosis, unspecified, Other and unspecified hyperlipidemia, Other decreased white blood cell count, PUD (peptic ulcer disease), Pulmonary embolism (HCC), SNHL (sensorineural hearing loss), Stroke (HCC), Toxic multinodular goiter, Toxic multinodular goiter without mention of thyrotoxic crisis or storm, Transient ischemic attack (TIA), and cerebral infarction without residual deficits(V12.54), and Vitamin D deficiency.. She also  has a past surgical history that includes Colonoscopy; [place] tooth extraction; and Cataract removal (Bilateral).  Precautions/Allergies: Patient has no known allergies.     Observations:  Alertness: Alert  Drowsy  Voice: WFL  Speech: Intelligible    Prior Dysphagia History: Daughter reports patient evaluated by [facility]  3/27/24, unsure of what SLP recs were. Do not have access to report.   Prior Instrumental Assessment: None    Current Diet:  Soft and Bite-Sized  Thin Liquids    Respiratory Status: Room air    Pain:  Patient

## 2024-04-02 NOTE — PROGRESS NOTES
ACUTE PHYSICAL THERAPY GOALS:   (Developed with and agreed upon by patient and/or caregiver.)    (1.) Lilliam Song  will move from supine to sit and sit to supine , scoot up and down, and roll side to side with MODERATE ASSIST within 7 treatment day(s).    (2.) Lilliam Song will transfer from bed to chair and chair to bed with MODERATE ASSIST using the least restrictive device within 7 treatment day(s).    (3.) Lilliam Song will propel w/c with MINIMAL ASSIST for 100 feet within 7 treatment day(s).   (4.) Lilliam Song will perform therapeutic exercises x 10 min for HEP with MINIMAL ASSIST to improve strength, endurance, and functional mobility within 7 treatment day(s).      PHYSICAL THERAPY Initial Assessment, Daily Note, AM, and PM  (Link to Caseload Tracking: PT Visit Days : 1  Acknowledge Orders  Time In/Out  PT Charge Capture  Rehab Caseload Tracker    Lilliam Song is a 89 y.o. female   PRIMARY DIAGNOSIS: Hypercalcemia  Hypercalcemia [E83.52]  Hyperparathyroidism (HCC) [E21.3]  Anemia, unspecified type [D64.9]       Reason for Referral: Generalized Muscle Weakness (M62.81)  Difficulty in walking, Not elsewhere classified (R26.2)  Other abnormalities of gait and mobility (R26.89)  Inpatient: Payor: [employer] MEDICARE / Plan: AET MEDICARE-ADVANTAGE PPO / Product Type: Medicare /     ASSESSMENT:     REHAB RECOMMENDATIONS:   Recommendation to date pending progress:  Setting:  Home Health Therapy, pt was just at STR and family reports she is coming home at d/c this time    Equipment:     Evangelista Lift and sling, hospital bed and manual wheelchair     ASSESSMENT:   Ms. Song is a 89 year old female who presents to hospital secondary to above diagnosis. Pt daughter in room, reports that pt was just in STR, were not satisfied with care given and reports that she will be discharging home this time. Pt daughter reports that she lives in level home with level entry, has multiple equipment including upright

## 2024-04-02 NOTE — PLAN OF CARE
Problem: Discharge Planning  Goal: Discharge to home or other facility with appropriate resources  4/2/2024 0717 by Erika Bhatti RN  Outcome: Progressing  4/2/2024 0337 by Kat Garcia RN  Outcome: Progressing     Problem: Pain  Goal: Verbalizes/displays adequate comfort level or baseline comfort level  4/2/2024 0717 by Erika Bhatti RN  Outcome: Progressing  4/2/2024 0337 by Kat Garcia RN  Outcome: Progressing     Problem: Skin/Tissue Integrity  Goal: Absence of new skin breakdown  Description: 1.  Monitor for areas of redness and/or skin breakdown  2.  Assess vascular access sites hourly  3.  Every 4-6 hours minimum:  Change oxygen saturation probe site  4.  Every 4-6 hours:  If on nasal continuous positive airway pressure, respiratory therapy assess nares and determine need for appliance change or resting period.  4/2/2024 0717 by Erika Bhatti RN  Outcome: Progressing  4/2/2024 0337 by Kat Garcia RN  Outcome: Progressing     Problem: Safety - Adult  Goal: Free from fall injury  4/2/2024 0717 by Erika Bhatti RN  Outcome: Progressing  4/2/2024 0337 by Kat Garcia RN  Outcome: Progressing     Problem: ABCDS Injury Assessment  Goal: Absence of physical injury  4/2/2024 0717 by Erika Bhatti RN  Outcome: Progressing  4/2/2024 0337 by Kat Garcia RN  Outcome: Progressing     Problem: Chronic Conditions and Co-morbidities  Goal: Patient's chronic conditions and co-morbidity symptoms are monitored and maintained or improved  4/2/2024 0717 by Erika Bhatti RN  Outcome: Progressing  4/2/2024 0337 by Kat Garcia RN  Outcome: Progressing

## 2024-04-02 NOTE — PROGRESS NOTES
Replacement K and replacement MAG was given. Pt was slid over to chair with therapy for several hours today. PRN pain meds given 1x during shift.       Rounds performed throughout shift. Pt denies needs at this time. Bed in low position, locked and call light/personal items within reach. Will report to night shift nurse.

## 2024-04-03 LAB
ANION GAP SERPL CALC-SCNC: 5 MMOL/L (ref 2–11)
BACTERIA SPEC CULT: ABNORMAL
BACTERIA SPEC CULT: ABNORMAL
BASOPHILS # BLD: 0 K/UL (ref 0–0.2)
BASOPHILS NFR BLD: 0 % (ref 0–2)
BUN SERPL-MCNC: 3 MG/DL (ref 8–23)
CALCIUM SERPL-MCNC: 8.9 MG/DL (ref 8.3–10.4)
CHLORIDE SERPL-SCNC: 114 MMOL/L (ref 103–113)
CO2 SERPL-SCNC: 22 MMOL/L (ref 21–32)
CREAT SERPL-MCNC: 0.4 MG/DL (ref 0.6–1)
DIFFERENTIAL METHOD BLD: ABNORMAL
EOSINOPHIL # BLD: 0.2 K/UL (ref 0–0.8)
EOSINOPHIL NFR BLD: 3 % (ref 0.5–7.8)
ERYTHROCYTE [DISTWIDTH] IN BLOOD BY AUTOMATED COUNT: 18.4 % (ref 11.9–14.6)
GLUCOSE SERPL-MCNC: 87 MG/DL (ref 65–100)
HCT VFR BLD AUTO: 22.8 % (ref 35.8–46.3)
HCT VFR BLD AUTO: 23.7 % (ref 35.8–46.3)
HCT VFR BLD AUTO: 24.9 % (ref 35.8–46.3)
HGB BLD-MCNC: 7.2 G/DL (ref 11.7–15.4)
HGB BLD-MCNC: 7.4 G/DL (ref 11.7–15.4)
HGB BLD-MCNC: 7.7 G/DL (ref 11.7–15.4)
IMM GRANULOCYTES # BLD AUTO: 0.1 K/UL (ref 0–0.5)
IMM GRANULOCYTES NFR BLD AUTO: 1 % (ref 0–5)
LYMPHOCYTES # BLD: 0.8 K/UL (ref 0.5–4.6)
LYMPHOCYTES NFR BLD: 13 % (ref 13–44)
MCH RBC QN AUTO: 32.1 PG (ref 26.1–32.9)
MCHC RBC AUTO-ENTMCNC: 31.6 G/DL (ref 31.4–35)
MCV RBC AUTO: 101.8 FL (ref 82–102)
MONOCYTES # BLD: 0.6 K/UL (ref 0.1–1.3)
MONOCYTES NFR BLD: 10 % (ref 4–12)
NEUTS SEG # BLD: 4.6 K/UL (ref 1.7–8.2)
NEUTS SEG NFR BLD: 73 % (ref 43–78)
NRBC # BLD: 0 K/UL (ref 0–0.2)
PLATELET # BLD AUTO: 258 K/UL (ref 150–450)
PMV BLD AUTO: 9.7 FL (ref 9.4–12.3)
POTASSIUM SERPL-SCNC: 3.5 MMOL/L (ref 3.5–5.1)
RBC # BLD AUTO: 2.24 M/UL (ref 4.05–5.2)
SERVICE CMNT-IMP: ABNORMAL
SODIUM SERPL-SCNC: 141 MMOL/L (ref 136–146)
T3FREE SERPL-MCNC: 2.1 PG/ML (ref 2–4.4)
WBC # BLD AUTO: 6.3 K/UL (ref 4.3–11.1)

## 2024-04-03 PROCEDURE — 36415 COLL VENOUS BLD VENIPUNCTURE: CPT

## 2024-04-03 PROCEDURE — 6370000000 HC RX 637 (ALT 250 FOR IP): Performed by: INTERNAL MEDICINE

## 2024-04-03 PROCEDURE — 85014 HEMATOCRIT: CPT

## 2024-04-03 PROCEDURE — 2580000003 HC RX 258: Performed by: INTERNAL MEDICINE

## 2024-04-03 PROCEDURE — 1100000000 HC RM PRIVATE

## 2024-04-03 PROCEDURE — 92507 TX SP LANG VOICE COMM INDIV: CPT

## 2024-04-03 PROCEDURE — 80048 BASIC METABOLIC PNL TOTAL CA: CPT

## 2024-04-03 PROCEDURE — 85018 HEMOGLOBIN: CPT

## 2024-04-03 PROCEDURE — 82397 CHEMILUMINESCENT ASSAY: CPT

## 2024-04-03 PROCEDURE — 6370000000 HC RX 637 (ALT 250 FOR IP): Performed by: HOSPITALIST

## 2024-04-03 PROCEDURE — 85025 COMPLETE CBC W/AUTO DIFF WBC: CPT

## 2024-04-03 PROCEDURE — 6360000002 HC RX W HCPCS: Performed by: HOSPITALIST

## 2024-04-03 PROCEDURE — 97530 THERAPEUTIC ACTIVITIES: CPT

## 2024-04-03 RX ORDER — LACTULOSE 10 G/15ML
20 SOLUTION ORAL 2 TIMES DAILY
Status: DISCONTINUED | OUTPATIENT
Start: 2024-04-03 | End: 2024-04-04

## 2024-04-03 RX ORDER — PREGABALIN 25 MG/1
25 CAPSULE ORAL 2 TIMES DAILY
Status: DISCONTINUED | OUTPATIENT
Start: 2024-04-03 | End: 2024-04-05 | Stop reason: HOSPADM

## 2024-04-03 RX ADMIN — SODIUM CHLORIDE: 9 INJECTION, SOLUTION INTRAVENOUS at 22:41

## 2024-04-03 RX ADMIN — BRIMONIDINE TARTRATE 1 DROP: 2 SOLUTION/ DROPS OPHTHALMIC at 20:35

## 2024-04-03 RX ADMIN — LACTULOSE 20 G: 10 SOLUTION ORAL at 20:15

## 2024-04-03 RX ADMIN — SODIUM CHLORIDE, PRESERVATIVE FREE 10 ML: 5 INJECTION INTRAVENOUS at 09:25

## 2024-04-03 RX ADMIN — PANTOPRAZOLE SODIUM 40 MG: 40 TABLET, DELAYED RELEASE ORAL at 15:36

## 2024-04-03 RX ADMIN — POLYETHYLENE GLYCOL 3350 17 G: 17 POWDER, FOR SOLUTION ORAL at 09:25

## 2024-04-03 RX ADMIN — MONTELUKAST 10 MG: 10 TABLET, FILM COATED ORAL at 20:15

## 2024-04-03 RX ADMIN — AMOXICILLIN 500 MG: 500 CAPSULE ORAL at 20:15

## 2024-04-03 RX ADMIN — METOPROLOL TARTRATE 25 MG: 25 TABLET, FILM COATED ORAL at 20:15

## 2024-04-03 RX ADMIN — HYDROCODONE BITARTRATE AND ACETAMINOPHEN 1 TABLET: 7.5; 325 TABLET ORAL at 00:51

## 2024-04-03 RX ADMIN — POLYETHYLENE GLYCOL 3350 17 G: 17 POWDER, FOR SOLUTION ORAL at 21:00

## 2024-04-03 RX ADMIN — DORZOLAMIDE HYDROCHLORIDE AND TIMOLOL MALEATE 1 DROP: 20; 5 SOLUTION/ DROPS OPHTHALMIC at 09:27

## 2024-04-03 RX ADMIN — MORPHINE SULFATE 2 MG: 2 INJECTION, SOLUTION INTRAMUSCULAR; INTRAVENOUS at 00:05

## 2024-04-03 RX ADMIN — PREGABALIN 25 MG: 25 CAPSULE ORAL at 14:22

## 2024-04-03 RX ADMIN — CINACALCET HYDROCHLORIDE 30 MG: 30 TABLET, FILM COATED ORAL at 09:21

## 2024-04-03 RX ADMIN — MORPHINE SULFATE 2 MG: 2 INJECTION, SOLUTION INTRAMUSCULAR; INTRAVENOUS at 04:06

## 2024-04-03 RX ADMIN — AMOXICILLIN 500 MG: 500 CAPSULE ORAL at 04:28

## 2024-04-03 RX ADMIN — AMOXICILLIN 500 MG: 500 CAPSULE ORAL at 14:22

## 2024-04-03 RX ADMIN — HYDROCODONE BITARTRATE AND ACETAMINOPHEN 1 TABLET: 7.5; 325 TABLET ORAL at 15:36

## 2024-04-03 RX ADMIN — SODIUM CHLORIDE, PRESERVATIVE FREE 10 ML: 5 INJECTION INTRAVENOUS at 21:39

## 2024-04-03 RX ADMIN — LOSARTAN POTASSIUM 50 MG: 50 TABLET, FILM COATED ORAL at 09:21

## 2024-04-03 RX ADMIN — DOCUSATE SODIUM 50 MG AND SENNOSIDES 8.6 MG 2 TABLET: 8.6; 5 TABLET, FILM COATED ORAL at 09:20

## 2024-04-03 RX ADMIN — METOPROLOL TARTRATE 25 MG: 25 TABLET, FILM COATED ORAL at 09:21

## 2024-04-03 RX ADMIN — APIXABAN 5 MG: 5 TABLET, FILM COATED ORAL at 20:15

## 2024-04-03 RX ADMIN — PREGABALIN 25 MG: 25 CAPSULE ORAL at 20:15

## 2024-04-03 RX ADMIN — BRIMONIDINE TARTRATE 1 DROP: 2 SOLUTION/ DROPS OPHTHALMIC at 09:37

## 2024-04-03 RX ADMIN — LATANOPROST 1 DROP: 50 SOLUTION OPHTHALMIC at 20:35

## 2024-04-03 RX ADMIN — HYDROCODONE BITARTRATE AND ACETAMINOPHEN 1 TABLET: 7.5; 325 TABLET ORAL at 20:15

## 2024-04-03 RX ADMIN — APIXABAN 5 MG: 5 TABLET, FILM COATED ORAL at 09:20

## 2024-04-03 RX ADMIN — DORZOLAMIDE HYDROCHLORIDE AND TIMOLOL MALEATE 1 DROP: 20; 5 SOLUTION/ DROPS OPHTHALMIC at 20:35

## 2024-04-03 RX ADMIN — SODIUM CHLORIDE: 9 INJECTION, SOLUTION INTRAVENOUS at 07:31

## 2024-04-03 RX ADMIN — LACTULOSE 20 G: 10 SOLUTION ORAL at 09:20

## 2024-04-03 RX ADMIN — DOCUSATE SODIUM 50 MG AND SENNOSIDES 8.6 MG 2 TABLET: 8.6; 5 TABLET, FILM COATED ORAL at 20:15

## 2024-04-03 RX ADMIN — PANTOPRAZOLE SODIUM 40 MG: 40 TABLET, DELAYED RELEASE ORAL at 04:28

## 2024-04-03 RX ADMIN — HYDROCODONE BITARTRATE AND ACETAMINOPHEN 1 TABLET: 7.5; 325 TABLET ORAL at 09:22

## 2024-04-03 RX ADMIN — SODIUM CHLORIDE: 9 INJECTION, SOLUTION INTRAVENOUS at 00:03

## 2024-04-03 ASSESSMENT — PAIN DESCRIPTION - ORIENTATION
ORIENTATION: RIGHT;LEFT;ANTERIOR
ORIENTATION: RIGHT;LEFT;ANTERIOR;POSTERIOR
ORIENTATION: RIGHT;LEFT;ANTERIOR;POSTERIOR

## 2024-04-03 ASSESSMENT — PAIN DESCRIPTION - PAIN TYPE
TYPE: CHRONIC PAIN

## 2024-04-03 ASSESSMENT — PAIN SCALES - GENERAL
PAINLEVEL_OUTOF10: 1
PAINLEVEL_OUTOF10: 0
PAINLEVEL_OUTOF10: 10
PAINLEVEL_OUTOF10: 0
PAINLEVEL_OUTOF10: 6
PAINLEVEL_OUTOF10: 10
PAINLEVEL_OUTOF10: 4
PAINLEVEL_OUTOF10: 6
PAINLEVEL_OUTOF10: 6
PAINLEVEL_OUTOF10: 4

## 2024-04-03 ASSESSMENT — PAIN DESCRIPTION - DESCRIPTORS
DESCRIPTORS: CRAMPING;DISCOMFORT
DESCRIPTORS: CRAMPING;SORE;TENDER
DESCRIPTORS: DISCOMFORT

## 2024-04-03 ASSESSMENT — PAIN DESCRIPTION - LOCATION
LOCATION: GENERALIZED;BACK;KNEE
LOCATION: GENERALIZED
LOCATION: GENERALIZED
LOCATION: BACK;KNEE
LOCATION: GENERALIZED

## 2024-04-03 ASSESSMENT — PAIN SCALES - WONG BAKER: WONGBAKER_NUMERICALRESPONSE: NO HURT

## 2024-04-03 NOTE — PROGRESS NOTES
GOALS:  LTG: Patient will maintain adequate hydration/nutrition with optimum safety and efficiency of swallowing function with PO intake without overt signs or symptoms of aspiration for the highest appropriate diet level.  STG: GOALS MET 24  Patient will consume soft and bite-sized textures and thin liquids without overt signs or symptoms of airway compromise.  Patient will safely ingest diet trials during therapeutic feedings with SLP without overt signs or symptoms of respiratory compromise in efforts to advance diet.    LTG: Patient will increase neuro-linguistic abilities to increase safety and awareness in functional living environment ADDED 24  STG: Patient will complete reasoning tasks to improve problem solving and safety awareness with 80% accuracy given mod cueing  STG: Patient will demonstrate attention by attending salient task details with 80% accuracy given mod cueing PROGRESSING 24  STG: Patient/caregiver will utilize memory compensatory strategies to improve recall of functional list/message with 80% accuracy given mod assistance PROGRESSING 24  STG: Patient/caregiver will utilize re-orientation compensatory strategies to improve awareness of temporal/locational information with 80% accuracy given moderate assistance. PROGRESSING 24    SPEECH LANGUAGE PATHOLOGY: COGNITIVE-COMMUNICATION Daily Note #3  Acknowledge Order  I  Therapy Time  I   Charges     I  Rehab Caseload Tracker      NAME: Lilliam Song  : 1934  MRN: [id number]    ADMISSION DATE: 3/28/2024  PRIMARY DIAGNOSIS: Hypercalcemia    ICD-10: Treatment Diagnosis: R13.12 Dysphagia, Oropharyngeal Phase    RECOMMENDATIONS   Diet:    Soft and Bite-Sized  Thin Liquids    Medication: as tolerated   Compensatory Swallowing Strategies:   Slow rate of intake  Small bites/sips  Upright as possible for all oral intake  Cognitive-Communication Strategies  Utilize compensatory strategies (orientation, memory, etc.)  Provide

## 2024-04-03 NOTE — PROGRESS NOTES
Pt resting quietly at this time, NAD. Pt turned every 2 hours and prn, bathed, pericare and dressing changed to sacrum. Voltaran applied to bilat knees, legs (anterior) and to L. Hip. Daughter of patient at bedside and assists with care. Hourly rounds completed. Bed in low and locked position, call light and personal items within reach. Pt given Lactulose, sennakot, Prune juice and Miralax with no results. Pt also sat on side of bed with Therapy today. Will give bedside report to oncoming nurse.

## 2024-04-03 NOTE — PROGRESS NOTES
ACUTE PHYSICAL THERAPY GOALS:   (Developed with and agreed upon by patient and/or caregiver.)  (1.) Lilliam Song  will move from supine to sit and sit to supine , scoot up and down, and roll side to side with MODERATE ASSIST within 7 treatment day(s).    (2.) Lilliam Song will transfer from bed to chair and chair to bed with MODERATE ASSIST using the least restrictive device within 7 treatment day(s).    (3.) Lilliam Song will propel w/c with MINIMAL ASSIST for 100 feet within 7 treatment day(s).   (4.) Lilliam Song will perform therapeutic exercises x 10 min for HEP with MINIMAL ASSIST to improve strength, endurance, and functional mobility within 7 treatment day(s    PHYSICAL THERAPY: Daily Note PM   (Link to Caseload Tracking: PT Visit Days : 2  Time In/Out PT Charge Capture  Rehab Caseload Tracker  Orders    Lilliam Song is a 89 y.o. female   PRIMARY DIAGNOSIS: Hypercalcemia  Hypercalcemia [E83.52]  Hyperparathyroidism (HCC) [E21.3]  Anemia, unspecified type [D64.9]       Inpatient: Payor: [employer] MEDICARE / Plan: [facility] MEDICARE-ADVANTAGE PPO / Product Type: Medicare /     ASSESSMENT:     REHAB RECOMMENDATIONS:   Recommendation to date pending progress:  Setting:  Home Health Therapy    Equipment:    To Be Determined     ASSESSMENT:  Ms. Song presents supine in bed, agreeable to attempt to sit on EOB with therapy. Pt required Max A with BLEs and with trunk to perform supine to sit transfer, requires extra time to perform task currently to manage pain. Pt able to sit on EOB with min/mod A for balance, pt presents with posterior lean, requiring manual and verbal cues to assist. Pt able to maintain sitting on EOB for several mins before needing to return to supine, assist with BLEs and trunk and used sheet to assist with positioning pt higher in bed. Spoke with daughter at bedside regarding home equipment, pt left supine in bed, all needs within reach, daughter in room, RN notified, was able to  [] [] [] []              Standing Static [] [] [] [] [] []    Standing Dynamic [] [] [] [] [] []      GAIT: I Mod I S SBA CGA Min Mod Max Total  NT x2 Comments:   Level of Assistance [] [] [] [] [] [] [] [] [] [x] []    Distance   feet    DME N/A    Gait Quality N/A    Weightbearing Status      Stairs      I=Independent, Mod I=Modified Independent, S=Supervision, SBA=Standby Assistance, CGA=Contact Guard Assistance,   Min=Minimal Assistance, Mod=Moderate Assistance, Max=Maximal Assistance, Total=Total Assistance, NT=Not Tested    PLAN:   FREQUENCY AND DURATION: 3 times/week for duration of hospital stay or until stated goals are met, whichever comes first.    TREATMENT:   TREATMENT:   Therapeutic Activity (25 Minutes): Therapeutic activity included Rolling, Supine to Sit, Sit to Supine, Scooting, and Sitting balance  to improve functional Activity tolerance, Balance, Mobility, and Strength.    TREATMENT GRID:  N/A    AFTER TREATMENT PRECAUTIONS: Bed, Bed/Chair Locked, Call light within reach, Heels floated, Needs within reach, RN notified, and Visitors at bedside    INTERDISCIPLINARY COLLABORATION:  RN/ PCT    EDUCATION: Education Given To: Patient;Family  Education Provided: Equipment;Transfer Training;Fall Prevention Strategies  Education Method: Verbal  Barriers to Learning: Cognition  Education Outcome: Continued education needed    TIME IN/OUT:  Time In: 1423  Time Out: 1451  Minutes: 28    Demetrio Blanchard PT

## 2024-04-03 NOTE — CARE COORDINATION
CM confirmed with [facility] bed, ford lift, bedside commode and wheelchair are being delivered to the house tomorrow.     Fredrick BARRAGAN, ACM  [place]

## 2024-04-03 NOTE — PROGRESS NOTES
Hospitalist Progress Note   Admit Date:  3/28/2024 12:08 PM   Name:  Lilliam Song   Age:  89 y.o.  Sex:  female  :  1934   MRN:  [id number]   Room:  [facility]    Presenting/Chief Complaint: OTHER     Reason(s) for Admission: Hypercalcemia [E83.52]  Hyperparathyroidism (HCC) [E21.3]  Anemia, unspecified type [D64.9]     Hospital Course:   AS Per prior documentation:    \"Lilliam Song is a 89 y.o. female with medical history of hyperparathyroidism (on chronic Cinacalcet per endocrinology that she follows with at [facility]), mulinodular goiter, HTN, HLD, PVD, and low-grade papillary tumors of the bladder (recommendation for TURBT but patient and family wishes to opt for monitoring) presents at the request of her PCP, Dr. Heredia, due to worsening fatigue, failure to thrive, and constipation. She was recently hospitalized and discharged from [place] after being diagnosed and started on Eliquis for a pulmonary embolism. She was discharged to SNF but daughter took her home as she was not having a good experience. Since that time, she has essentially been in bed and not able to get up more than once or twice to the recliner. Additionally, she has had constipation for \"weeks\" and has only been able to eat \"half a cup of grits\". She also \"hurts all over, everywhere.\"      ED course: ionized calcium of 5.88. Bili of 2.7. Hgb of 8.5. CXR negative. Hospitalist consulted for admission. \"        Subjective & 24hr Events:   Pt seen and evaluated.  Daughter @ bedside  all ?'s answered   C/o pain all over -does not want to be turned by staff until she gets pain medication-  Difficult situation because pain medicine is also constipating & patient moves very little at baseline- since about 2 weeks ago per daughter  Calcium is trending down  She was previously s/p 1 unit of prbcs for HB of 6.2- no signs of active bleed- likely dilutional- will monitor  She has completed antibiotics for an Enterococcus UTI.  Awaiting bowel  5\").    Weight as of this encounter: 76.1 kg (167 lb 11.2 oz).    Intake/Output Summary (Last 24 hours) at 4/3/2024 0726  Last data filed at 4/2/2024 2342  Gross per 24 hour   Intake 5577.66 ml   Output 2350 ml   Net 3227.66 ml           Physical Exam:     General:    Well nourished.  Complains of pain  Head:  Normocephalic, atraumatic  Eyes:  Sclerae appear normal.  Pupils equally round.  ENT:  Nares appear normal.  Moist oral mucosa  Neck:  No restricted ROM.  Trachea midline   CV:   RRR.  No m/r/g.  No jugular venous distension.  Lungs:   CTAB.  No wheezing, rhonchi, or rales.  Symmetric expansion.  Abdomen:   Soft, nontender, nondistended.  Extremities: No cyanosis or clubbing.  No edema  Skin:     No rashes.  Normal coloration.   Warm and dry.    Neuro:  Awake and alert to person CN II-XII grossly intact.    Psych:  Normal mood and affect.      I have personally reviewed labs and tests:  Recent Labs:  Recent Results (from the past 48 hour(s))   Hemoglobin and Hematocrit    Collection Time: 04/01/24  2:54 PM   Result Value Ref Range    Hemoglobin 7.5 (L) 11.7 - 15.4 g/dL    Hematocrit 24.3 (L) 35.8 - 46.3 %   Hepatic Function Panel    Collection Time: 04/01/24  2:54 PM   Result Value Ref Range    Total Protein 6.2 (L) 6.3 - 8.2 g/dL    Albumin 2.2 (L) 3.2 - 4.6 g/dL    Globulin 4.0 2.8 - 4.5 g/dL    Albumin/Globulin Ratio 0.6 0.4 - 1.6      Total Bilirubin 0.7 0.2 - 1.1 MG/DL    Bilirubin, Direct 0.3 <0.4 MG/DL    Alk Phosphatase 80 50 - 136 U/L    AST 30 15 - 37 U/L    ALT 17 12 - 65 U/L   T3, Free    Collection Time: 04/01/24  2:54 PM   Result Value Ref Range    T3, Free 2.1 2.0 - 4.4 pg/mL   Hemoglobin and Hematocrit    Collection Time: 04/02/24 12:02 AM   Result Value Ref Range    Hemoglobin 8.5 (L) 11.7 - 15.4 g/dL    Hematocrit 27.3 (L) 35.8 - 46.3 %   Basic Metabolic Panel    Collection Time: 04/02/24  5:52 AM   Result Value Ref Range    Sodium 141 136 - 146 mmol/L    Potassium 3.3 (L) 3.5 - 5.1 mmol/L

## 2024-04-03 NOTE — CARE COORDINATION
Evangelista lift orders sent to [facility]. Discharge plan is to return home with family and .     Fredrick BARRAGAN, ACM  [place]

## 2024-04-04 ENCOUNTER — APPOINTMENT (OUTPATIENT)
Dept: GENERAL RADIOLOGY | Age: 89
DRG: 640 | End: 2024-04-04
Payer: MEDICARE

## 2024-04-04 PROBLEM — N39.0 ENTEROCOCCUS UTI: Status: ACTIVE | Noted: 2024-04-04

## 2024-04-04 PROBLEM — E05.90 HYPERTHYROIDISM: Status: ACTIVE | Noted: 2024-04-04

## 2024-04-04 PROBLEM — K59.00 CONSTIPATION: Status: ACTIVE | Noted: 2024-04-04

## 2024-04-04 PROBLEM — B95.2 ENTEROCOCCUS UTI: Status: ACTIVE | Noted: 2024-04-04

## 2024-04-04 PROBLEM — G89.4 PAIN SYNDROME, CHRONIC: Status: ACTIVE | Noted: 2024-04-04

## 2024-04-04 PROBLEM — E03.9 HYPOTHYROIDISM: Status: ACTIVE | Noted: 2024-04-04

## 2024-04-04 LAB
ANION GAP SERPL CALC-SCNC: 5 MMOL/L (ref 2–11)
APPEARANCE UR: CLEAR
BILIRUB UR QL: NEGATIVE
BUN SERPL-MCNC: 3 MG/DL (ref 8–23)
CALCIUM SERPL-MCNC: 8.6 MG/DL (ref 8.3–10.4)
CHLORIDE SERPL-SCNC: 116 MMOL/L (ref 103–113)
CO2 SERPL-SCNC: 21 MMOL/L (ref 21–32)
COLOR UR: NORMAL
CREAT SERPL-MCNC: 0.4 MG/DL (ref 0.6–1)
ERYTHROCYTE [DISTWIDTH] IN BLOOD BY AUTOMATED COUNT: 18.1 % (ref 11.9–14.6)
GLUCOSE SERPL-MCNC: 77 MG/DL (ref 65–100)
GLUCOSE UR STRIP.AUTO-MCNC: NEGATIVE MG/DL
HCT VFR BLD AUTO: 24.1 % (ref 35.8–46.3)
HGB BLD-MCNC: 7.5 G/DL (ref 11.7–15.4)
HGB UR QL STRIP: NEGATIVE
KETONES UR QL STRIP.AUTO: NEGATIVE MG/DL
LEUKOCYTE ESTERASE UR QL STRIP.AUTO: NEGATIVE
MAGNESIUM SERPL-MCNC: 1.8 MG/DL (ref 1.8–2.4)
MCH RBC QN AUTO: 31.9 PG (ref 26.1–32.9)
MCHC RBC AUTO-ENTMCNC: 31.1 G/DL (ref 31.4–35)
MCV RBC AUTO: 102.6 FL (ref 82–102)
MM INDURATION, POC: 0 MM (ref 0–5)
NITRITE UR QL STRIP.AUTO: NEGATIVE
NRBC # BLD: 0 K/UL (ref 0–0.2)
PH UR STRIP: 6.5 (ref 5–9)
PLATELET # BLD AUTO: 279 K/UL (ref 150–450)
PMV BLD AUTO: 10.6 FL (ref 9.4–12.3)
POTASSIUM SERPL-SCNC: 3.2 MMOL/L (ref 3.5–5.1)
PPD, POC: NEGATIVE
PROT UR STRIP-MCNC: NEGATIVE MG/DL
RBC # BLD AUTO: 2.35 M/UL (ref 4.05–5.2)
SODIUM SERPL-SCNC: 142 MMOL/L (ref 136–146)
SP GR UR REFRACTOMETRY: 1.01 (ref 1–1.02)
UROBILINOGEN UR QL STRIP.AUTO: 0.2 EU/DL (ref 0.2–1)
WBC # BLD AUTO: 5.5 K/UL (ref 4.3–11.1)

## 2024-04-04 PROCEDURE — 36415 COLL VENOUS BLD VENIPUNCTURE: CPT

## 2024-04-04 PROCEDURE — 2580000003 HC RX 258: Performed by: INTERNAL MEDICINE

## 2024-04-04 PROCEDURE — 6370000000 HC RX 637 (ALT 250 FOR IP): Performed by: INTERNAL MEDICINE

## 2024-04-04 PROCEDURE — 6360000002 HC RX W HCPCS: Performed by: HOSPITALIST

## 2024-04-04 PROCEDURE — 81003 URINALYSIS AUTO W/O SCOPE: CPT

## 2024-04-04 PROCEDURE — 74018 RADEX ABDOMEN 1 VIEW: CPT

## 2024-04-04 PROCEDURE — 87086 URINE CULTURE/COLONY COUNT: CPT

## 2024-04-04 PROCEDURE — 83735 ASSAY OF MAGNESIUM: CPT

## 2024-04-04 PROCEDURE — 80048 BASIC METABOLIC PNL TOTAL CA: CPT

## 2024-04-04 PROCEDURE — 1100000000 HC RM PRIVATE

## 2024-04-04 PROCEDURE — 85027 COMPLETE CBC AUTOMATED: CPT

## 2024-04-04 PROCEDURE — 6370000000 HC RX 637 (ALT 250 FOR IP): Performed by: FAMILY MEDICINE

## 2024-04-04 PROCEDURE — 6370000000 HC RX 637 (ALT 250 FOR IP): Performed by: HOSPITALIST

## 2024-04-04 RX ORDER — BISACODYL 10 MG
10 SUPPOSITORY, RECTAL RECTAL DAILY
Status: DISCONTINUED | OUTPATIENT
Start: 2024-04-04 | End: 2024-04-05

## 2024-04-04 RX ORDER — LACTULOSE 10 G/15ML
20 SOLUTION ORAL 3 TIMES DAILY
Status: DISCONTINUED | OUTPATIENT
Start: 2024-04-04 | End: 2024-04-05

## 2024-04-04 RX ADMIN — SODIUM CHLORIDE, PRESERVATIVE FREE 10 ML: 5 INJECTION INTRAVENOUS at 22:16

## 2024-04-04 RX ADMIN — BRIMONIDINE TARTRATE 1 DROP: 2 SOLUTION/ DROPS OPHTHALMIC at 08:45

## 2024-04-04 RX ADMIN — APIXABAN 5 MG: 5 TABLET, FILM COATED ORAL at 22:13

## 2024-04-04 RX ADMIN — DORZOLAMIDE HYDROCHLORIDE AND TIMOLOL MALEATE 1 DROP: 20; 5 SOLUTION/ DROPS OPHTHALMIC at 22:17

## 2024-04-04 RX ADMIN — MORPHINE SULFATE 2 MG: 2 INJECTION, SOLUTION INTRAMUSCULAR; INTRAVENOUS at 18:09

## 2024-04-04 RX ADMIN — PREGABALIN 25 MG: 25 CAPSULE ORAL at 08:34

## 2024-04-04 RX ADMIN — METHIMAZOLE 2.5 MG: 5 TABLET ORAL at 08:43

## 2024-04-04 RX ADMIN — LACTULOSE 20 G: 10 SOLUTION ORAL at 08:33

## 2024-04-04 RX ADMIN — AMOXICILLIN 500 MG: 500 CAPSULE ORAL at 06:10

## 2024-04-04 RX ADMIN — DOCUSATE SODIUM 50 MG AND SENNOSIDES 8.6 MG 2 TABLET: 8.6; 5 TABLET, FILM COATED ORAL at 08:33

## 2024-04-04 RX ADMIN — METOPROLOL TARTRATE 25 MG: 25 TABLET, FILM COATED ORAL at 22:13

## 2024-04-04 RX ADMIN — PANTOPRAZOLE SODIUM 40 MG: 40 TABLET, DELAYED RELEASE ORAL at 18:09

## 2024-04-04 RX ADMIN — DORZOLAMIDE HYDROCHLORIDE AND TIMOLOL MALEATE 1 DROP: 20; 5 SOLUTION/ DROPS OPHTHALMIC at 08:45

## 2024-04-04 RX ADMIN — CINACALCET HYDROCHLORIDE 30 MG: 30 TABLET, FILM COATED ORAL at 08:43

## 2024-04-04 RX ADMIN — METOPROLOL TARTRATE 25 MG: 25 TABLET, FILM COATED ORAL at 08:34

## 2024-04-04 RX ADMIN — LACTULOSE 20 G: 10 SOLUTION ORAL at 14:20

## 2024-04-04 RX ADMIN — SODIUM CHLORIDE, PRESERVATIVE FREE 10 ML: 5 INJECTION INTRAVENOUS at 08:34

## 2024-04-04 RX ADMIN — ACETAMINOPHEN 650 MG: 325 TABLET ORAL at 22:13

## 2024-04-04 RX ADMIN — LATANOPROST 1 DROP: 50 SOLUTION OPHTHALMIC at 22:14

## 2024-04-04 RX ADMIN — POLYETHYLENE GLYCOL 3350 17 G: 17 POWDER, FOR SOLUTION ORAL at 08:33

## 2024-04-04 RX ADMIN — PANTOPRAZOLE SODIUM 40 MG: 40 TABLET, DELAYED RELEASE ORAL at 06:11

## 2024-04-04 RX ADMIN — APIXABAN 5 MG: 5 TABLET, FILM COATED ORAL at 08:34

## 2024-04-04 RX ADMIN — BRIMONIDINE TARTRATE 1 DROP: 2 SOLUTION/ DROPS OPHTHALMIC at 22:24

## 2024-04-04 RX ADMIN — LOSARTAN POTASSIUM 50 MG: 50 TABLET, FILM COATED ORAL at 08:33

## 2024-04-04 RX ADMIN — POTASSIUM BICARBONATE 40 MEQ: 782 TABLET, EFFERVESCENT ORAL at 08:43

## 2024-04-04 RX ADMIN — PREGABALIN 25 MG: 25 CAPSULE ORAL at 22:13

## 2024-04-04 RX ADMIN — HYDROCODONE BITARTRATE AND ACETAMINOPHEN 1 TABLET: 7.5; 325 TABLET ORAL at 06:10

## 2024-04-04 RX ADMIN — MONTELUKAST 10 MG: 10 TABLET, FILM COATED ORAL at 22:13

## 2024-04-04 ASSESSMENT — PAIN SCALES - GENERAL
PAINLEVEL_OUTOF10: 6
PAINLEVEL_OUTOF10: 3
PAINLEVEL_OUTOF10: 1

## 2024-04-04 ASSESSMENT — PAIN DESCRIPTION - DESCRIPTORS
DESCRIPTORS: DISCOMFORT
DESCRIPTORS: ACHING;DISCOMFORT;SORE

## 2024-04-04 ASSESSMENT — PAIN DESCRIPTION - ORIENTATION: ORIENTATION: LEFT

## 2024-04-04 ASSESSMENT — PAIN DESCRIPTION - LOCATION
LOCATION: FOOT
LOCATION: GENERALIZED

## 2024-04-04 ASSESSMENT — PAIN - FUNCTIONAL ASSESSMENT: PAIN_FUNCTIONAL_ASSESSMENT: PREVENTS OR INTERFERES SOME ACTIVE ACTIVITIES AND ADLS

## 2024-04-04 NOTE — PROGRESS NOTES
Hospitalist Progress Note   Admit Date:  3/28/2024 12:08 PM   Name:  Lilliam Song   Age:  89 y.o.  Sex:  female  :  1934   MRN:  [id number]   Room:  [facility]/    Presenting/Chief Complaint: OTHER     Reason(s) for Admission: Hypercalcemia [E83.52]  Hyperparathyroidism (HCC) [E21.3]  Anemia, unspecified type [D64.9]     Hospital Course:    89 y.o. female with medical history of hyperparathyroidism (on chronic Cinacalcet per endocrinology that she follows with at [place]), mulinodular goiter, HTN, HLD, PVD, and low-grade papillary tumors of the bladder (recommendation for TURBT but patient and family wishes to opt for monitoring) presents at the request of her PCP, Dr. Heredia, due to worsening fatigue, failure to thrive, and constipation. She was recently hospitalized and discharged from [place] after being diagnosed and started on Eliquis for a pulmonary embolism. She was discharged to SNF but daughter took her home as she was not having a good experience. Since that time, she has essentially been in bed and not able to get up more than once or twice to the recliner. Additionally, she has had constipation for \"weeks\" and has only been able to eat \"half a cup of grits\". She also \"hurts all over, everywhere.\"      ED course: ionized calcium of 5.88. Bili of 2.7. Hgb of 8.5. CXR negative. Hospitalist consulted for admission.    Pt was admitted with acute on chronic hypercalcemia most likely d/t hyperparathyroidism and dehydration contributing. Pt received IVF, Zoledronic acid with resolution of hypercalcemia. She was also found to have Coag negative staph UTI for which she was treated with Amoxicillin inpatient. She has chronic constipation that is difficult to control despite aggressive bowel regimen.       Subjective & 24hr Events:     Daughter at bedside. Wanted to know result of KUB prior to giving enema. GOC discussed, see ACP note. She was wondering if pt's bladder cancer history could be  sodium (VOLTAREN) 1 % gel 2 g  2 g Topical 4x Daily PRN    dorzolamide-timolol (COSOPT) 2-0.5 % ophthalmic solution 1 drop  1 drop Both Eyes BID    latanoprost (XALATAN) 0.005 % ophthalmic solution 1 drop  1 drop Both Eyes Nightly    montelukast (SINGULAIR) tablet 10 mg  10 mg Oral Daily       Signed:  Liliam Manrique DO    Part of this note may have been written by using a voice dictation software.  The note has been proof read but may still contain some grammatical/other typographical errors.

## 2024-04-04 NOTE — CARE COORDINATION
Evangelista lift did not get delivered to home. [facility] is now reporting they did not receive a evangelista lift order, however  has a fax confirmation of order going through, and confirmed they received yesterday. [facility] requesting a MD signature on order and will be faxing over the order form for MD to sign. Once this is signed and faxed back [facility] will be able to get a delivery time.     UPDATE: Received order from [facility] at 1543. NOEMI had Dr. Manrique sign order and faxed back at 1603 with confirmation.  will call in the AM to insure they received this and to confirm delivery time.     Fredrick BARRAGAN, ACM  [place]

## 2024-04-04 NOTE — PROGRESS NOTES
Comprehensive Nutrition Assessment    Type and Reason for Visit: Reassess  Malnutrition Screening Tool: Malnutrition Screen  Have you recently lost weight without trying?: 24 to 33 pounds (3 points)  Have you been eating poorly because of a decreased appetite?: Yes (1 point)  Malnutrition Screening Tool Score: 4    Nutrition Recommendations/Plan:   Meals and Snacks:  Diet: Continue current order    Nutrition Supplement Therapy:  Medical food supplement therapy:  Continue Ensure Enlive three times per day (this provides 350 kcal and 20 grams protein per bottle) prefers strawberry     Malnutrition Assessment:  Malnutrition Status: Moderate malnutrition  Context: Chronic Illness  Findings of clinical characteristics of malnutrition:   Energy Intake:  Mild decrease in energy intake (Comment) (intake declining since last admission)  Weight Loss:  Mild weight loss (specify amount and time period) (12% loss over 1 year per IM office records)     Body Fat Loss:  Mild body fat loss Orbital, Buccal region   Muscle Mass Loss:  Mild muscle mass loss Temples (temporalis), Hand (interosseous)     Nutrition Assessment:  Nutrition History:   3/11 RD note: Daughter provides majority of nutrition hx. Reports that her intake has been low since late February. States that in the 5-6 days prior to admission this declined further and she was taking in little to no PO, including fluids. At baseline, she takes two Ensure Max per day, snacks throughout the day, and then has one larger meal in the evening.    Dtr reports she ate some at rehab, since being home intake has primarily been ensure and water, occasionally takes some fruit or sweet potato.  Recent home intake of 2 ensure and 48 ounces of water.       Do You Have Any Cultural, Buddhist, or Ethnic Food Preferences?: No   Weight History:   Inpt wt hx as follows:  155# estimated 3/19/24, 3/28/24 155# stated and 167# bed scale.    IM office records: 143# 3/7/24, 163# 3/20/23 remarkable

## 2024-04-04 NOTE — PROGRESS NOTES
EOS     Pt resting in bed at this time. Pt was given medication for comfort due to her moaning and unable to get comfortable in bed. Daughter remains at the bedside, tearful because she cannot help her mother get comfortable. Pt has not needed any pain medication throughout this shift. Bed in low/locked position, call light within reach. Bedside shift report to be given to oncoming nurse.

## 2024-04-04 NOTE — PLAN OF CARE
Problem: Discharge Planning  Goal: Discharge to home or other facility with appropriate resources  4/4/2024 1119 by Jyoti Todd RN  Outcome: Progressing  4/4/2024 0358 by Kat Garcia RN  Outcome: Progressing     Problem: Pain  Goal: Verbalizes/displays adequate comfort level or baseline comfort level  4/4/2024 1119 by Jyoti Todd RN  Outcome: Progressing  4/4/2024 0358 by Kat Garcia RN  Outcome: Progressing     Problem: Skin/Tissue Integrity  Goal: Absence of new skin breakdown  Description: 1.  Monitor for areas of redness and/or skin breakdown  2.  Assess vascular access sites hourly  3.  Every 4-6 hours minimum:  Change oxygen saturation probe site  4.  Every 4-6 hours:  If on nasal continuous positive airway pressure, respiratory therapy assess nares and determine need for appliance change or resting period.  4/4/2024 1119 by Jyoti Todd RN  Outcome: Progressing  4/4/2024 0358 by Kat Garcia RN  Outcome: Progressing     Problem: Safety - Adult  Goal: Free from fall injury  4/4/2024 1119 by Jyoti Todd RN  Outcome: Progressing  4/4/2024 0358 by Kat Garcia RN  Outcome: Progressing     Problem: ABCDS Injury Assessment  Goal: Absence of physical injury  4/4/2024 1119 by Jyoti Todd RN  Outcome: Progressing  4/4/2024 0358 by Kat Garcia RN  Outcome: Progressing     Problem: Chronic Conditions and Co-morbidities  Goal: Patient's chronic conditions and co-morbidity symptoms are monitored and maintained or improved  4/4/2024 1119 by Jyoti Todd RN  Outcome: Progressing  4/4/2024 0358 by Kat Garcia RN  Outcome: Progressing

## 2024-04-04 NOTE — ACP (ADVANCE CARE PLANNING)
[facility] Hospital[facility]  At the heart of better care     Advance Care Planning   Admit Date:  3/28/2024 12:08 PM   Name:  Lilliam Song   Age:  89 y.o.  Sex:  female  :  1934   MRN:  [id number]   Room:  [facility]    Lilliam Song has capacity to make her own decisions:   No    If pt unable to make decisions, POA/surrogate decision maker:  Daughter    Pt oriented to person and  at bedside. Daughter Allie at bedside. She reported pt has a living will but it has not been notarized. Allie stated she is pt's POA. We discussed code status and pt's daughter desires for her to be FULL Code at this time, but only time limited trial as pt has expressed desire to NOT be dependent on the ventilator in the past. We also discussed that pt's overall debility, co-morbidities, frequent hospitalizations and quality of life and if there was a consideration of hospice/Palliative in the past. Pt's daughter stated that she might be interested in Palliative as an outpatient but will think about hospice. We will discuss with case management for outpatient referrals. All questions answered.    Patient or surrogate consented to discussion of the current conditions, workup, management plans, prognosis, and the risk for further deterioration.  Time spent: 35 minutes in direct discussion.      Signed:  Liliam Manrique DO

## 2024-04-04 NOTE — PROGRESS NOTES
completed initial visit with patient.  Daughter was at bedside and supportive.  Patient engaged in life review and spiritual reflection.   provided pastoral presence, prayer and empathetic listening.  Peace be with you,  Signed by  ALEKSANDR CardenasDiv.   [phone]

## 2024-04-04 NOTE — PROGRESS NOTES
Physical Therapy Note:    Attempted to see patient this AM for physical therapy treatment  session. Patient was off the floor for xray. Will follow and re-attempt as schedule permits/patient available. Thank you,    Sejal Curtis PTA     Rehab Caseload Tracker

## 2024-04-04 NOTE — PROGRESS NOTES
Occupational Therapy Note:    Attempted to see pt for OT treatment session this morning--pt was being taken CALE for x-ray. Will continue to follow and attempt to see as schedule allows.    Thank you,  Aliza Cassidy, OTR/L

## 2024-04-04 NOTE — PROGRESS NOTES
Hourly rounds performed this shift. Bed lowered and locked. Call light within reach. PRN pain medication given per MAR, pt satisfied. Pt turned Q2 and repositioned. Pt did not have BM this shift. Bedside report will be given to oncoming nurse.

## 2024-04-05 ENCOUNTER — TELEPHONE (OUTPATIENT)
Dept: INTERNAL MEDICINE CLINIC | Facility: CLINIC | Age: 89
End: 2024-04-05

## 2024-04-05 VITALS
WEIGHT: 167.7 LBS | OXYGEN SATURATION: 99 % | HEART RATE: 71 BPM | BODY MASS INDEX: 27.94 KG/M2 | SYSTOLIC BLOOD PRESSURE: 186 MMHG | DIASTOLIC BLOOD PRESSURE: 83 MMHG | TEMPERATURE: 98.2 F | HEIGHT: 65 IN | RESPIRATION RATE: 18 BRPM

## 2024-04-05 LAB
ANION GAP SERPL CALC-SCNC: 5 MMOL/L (ref 2–11)
BUN SERPL-MCNC: 4 MG/DL (ref 8–23)
CALCIUM SERPL-MCNC: 9.2 MG/DL (ref 8.3–10.4)
CHLORIDE SERPL-SCNC: 112 MMOL/L (ref 103–113)
CO2 SERPL-SCNC: 25 MMOL/L (ref 21–32)
CREAT SERPL-MCNC: 0.6 MG/DL (ref 0.6–1)
ERYTHROCYTE [DISTWIDTH] IN BLOOD BY AUTOMATED COUNT: 17.9 % (ref 11.9–14.6)
GLUCOSE SERPL-MCNC: 94 MG/DL (ref 65–100)
HCT VFR BLD AUTO: 25.8 % (ref 35.8–46.3)
HGB BLD-MCNC: 7.8 G/DL (ref 11.7–15.4)
MAGNESIUM SERPL-MCNC: 2 MG/DL (ref 1.8–2.4)
MAGNESIUM SERPL-MCNC: 2.1 MG/DL (ref 1.8–2.4)
MCH RBC QN AUTO: 31 PG (ref 26.1–32.9)
MCHC RBC AUTO-ENTMCNC: 30.2 G/DL (ref 31.4–35)
MCV RBC AUTO: 102.4 FL (ref 82–102)
NRBC # BLD: 0 K/UL (ref 0–0.2)
PLATELET # BLD AUTO: 301 K/UL (ref 150–450)
PMV BLD AUTO: 9.8 FL (ref 9.4–12.3)
POTASSIUM SERPL-SCNC: 3.1 MMOL/L (ref 3.5–5.1)
RBC # BLD AUTO: 2.52 M/UL (ref 4.05–5.2)
SODIUM SERPL-SCNC: 142 MMOL/L (ref 136–146)
WBC # BLD AUTO: 5.6 K/UL (ref 4.3–11.1)

## 2024-04-05 PROCEDURE — 6370000000 HC RX 637 (ALT 250 FOR IP): Performed by: HOSPITALIST

## 2024-04-05 PROCEDURE — 85027 COMPLETE CBC AUTOMATED: CPT

## 2024-04-05 PROCEDURE — 36415 COLL VENOUS BLD VENIPUNCTURE: CPT

## 2024-04-05 PROCEDURE — 83735 ASSAY OF MAGNESIUM: CPT

## 2024-04-05 PROCEDURE — 6370000000 HC RX 637 (ALT 250 FOR IP): Performed by: INTERNAL MEDICINE

## 2024-04-05 PROCEDURE — 6370000000 HC RX 637 (ALT 250 FOR IP): Performed by: FAMILY MEDICINE

## 2024-04-05 PROCEDURE — 51798 US URINE CAPACITY MEASURE: CPT

## 2024-04-05 PROCEDURE — 2580000003 HC RX 258: Performed by: INTERNAL MEDICINE

## 2024-04-05 PROCEDURE — 80048 BASIC METABOLIC PNL TOTAL CA: CPT

## 2024-04-05 RX ORDER — PANTOPRAZOLE SODIUM 40 MG/1
40 TABLET, DELAYED RELEASE ORAL 2 TIMES DAILY
Qty: 60 TABLET | Refills: 1 | Status: SHIPPED | OUTPATIENT
Start: 2024-04-05

## 2024-04-05 RX ORDER — PREGABALIN 25 MG/1
25 CAPSULE ORAL 2 TIMES DAILY
Qty: 60 CAPSULE | Refills: 0 | Status: SHIPPED | OUTPATIENT
Start: 2024-04-05 | End: 2024-05-05

## 2024-04-05 RX ORDER — SENNA AND DOCUSATE SODIUM 50; 8.6 MG/1; MG/1
2 TABLET, FILM COATED ORAL 2 TIMES DAILY
Qty: 60 TABLET | Refills: 1 | Status: SHIPPED | OUTPATIENT
Start: 2024-04-05

## 2024-04-05 RX ORDER — PANTOPRAZOLE SODIUM 40 MG/1
40 TABLET, DELAYED RELEASE ORAL DAILY
Qty: 30 TABLET | Refills: 3 | Status: SHIPPED | OUTPATIENT
Start: 2024-04-05 | End: 2024-04-05

## 2024-04-05 RX ORDER — LACTULOSE 10 G/15ML
20 SOLUTION ORAL 2 TIMES DAILY PRN
Qty: 473 ML | Refills: 0 | Status: SHIPPED | OUTPATIENT
Start: 2024-04-05

## 2024-04-05 RX ORDER — POLYETHYLENE GLYCOL 3350 17 G/17G
17 POWDER, FOR SOLUTION ORAL DAILY
Qty: 527 G | Refills: 0 | Status: SHIPPED | OUTPATIENT
Start: 2024-04-05

## 2024-04-05 RX ORDER — TRAMADOL HYDROCHLORIDE 50 MG/1
50 TABLET ORAL 2 TIMES DAILY PRN
Qty: 10 TABLET | Refills: 0 | Status: SHIPPED | OUTPATIENT
Start: 2024-04-05 | End: 2024-04-10

## 2024-04-05 RX ORDER — POTASSIUM CHLORIDE 20 MEQ/1
20 TABLET, EXTENDED RELEASE ORAL DAILY
Qty: 7 TABLET | Refills: 0 | Status: SHIPPED | OUTPATIENT
Start: 2024-04-05 | End: 2024-04-12

## 2024-04-05 RX ADMIN — LOSARTAN POTASSIUM 50 MG: 50 TABLET, FILM COATED ORAL at 09:15

## 2024-04-05 RX ADMIN — METHIMAZOLE 2.5 MG: 5 TABLET ORAL at 09:15

## 2024-04-05 RX ADMIN — APIXABAN 5 MG: 5 TABLET, FILM COATED ORAL at 09:15

## 2024-04-05 RX ADMIN — LACTULOSE 20 G: 10 SOLUTION ORAL at 09:16

## 2024-04-05 RX ADMIN — PREGABALIN 25 MG: 25 CAPSULE ORAL at 09:15

## 2024-04-05 RX ADMIN — DORZOLAMIDE HYDROCHLORIDE AND TIMOLOL MALEATE 1 DROP: 20; 5 SOLUTION/ DROPS OPHTHALMIC at 09:17

## 2024-04-05 RX ADMIN — CINACALCET HYDROCHLORIDE 30 MG: 30 TABLET, FILM COATED ORAL at 09:14

## 2024-04-05 RX ADMIN — POLYETHYLENE GLYCOL 3350 17 G: 17 POWDER, FOR SOLUTION ORAL at 09:16

## 2024-04-05 RX ADMIN — METOPROLOL TARTRATE 25 MG: 25 TABLET, FILM COATED ORAL at 09:15

## 2024-04-05 RX ADMIN — BRIMONIDINE TARTRATE 1 DROP: 2 SOLUTION/ DROPS OPHTHALMIC at 09:17

## 2024-04-05 RX ADMIN — DOCUSATE SODIUM 50 MG AND SENNOSIDES 8.6 MG 2 TABLET: 8.6; 5 TABLET, FILM COATED ORAL at 09:15

## 2024-04-05 RX ADMIN — SODIUM CHLORIDE, PRESERVATIVE FREE 10 ML: 5 INJECTION INTRAVENOUS at 09:17

## 2024-04-05 ASSESSMENT — PAIN SCALES - WONG BAKER: WONGBAKER_NUMERICALRESPONSE: NO HURT

## 2024-04-05 ASSESSMENT — PAIN SCALES - GENERAL: PAINLEVEL_OUTOF10: 0

## 2024-04-05 NOTE — CARE COORDINATION
CM called [facility] and spoke with Blayne, he confirmed  they received the signed ford lift order- but are needing an additional signature. They will be faxing an additional order this morning. Blayne reports the ford lift will be delivered before 1100 this am. CM following and daughter updated.     Fredrick BARRAGAN, ACM  [place]

## 2024-04-05 NOTE — TELEPHONE ENCOUNTER
Patient has been scheduled for a hospt. Discharge appt with [facility] on 4/18/24. She is being released today 4/5/24  Thank you   Julianne

## 2024-04-05 NOTE — CARE COORDINATION
Discharge note:  [facility] ambulance transport arranged for 4 pm today to home.  CM confirmed home address.  [facility] already ordered as a resumption of care (ELIZ), and new referral sent to [facility] palliative care via fax [phone].  Ms. Aleida Navarrete came by and talked with patient and her daughter.  Also, [facility] has dropped off BSC and hospital bed, and is supposed to be delivering Evangelista lift today.       04/05/24 1300   Service Assessment   Cognition Alert   Accompanied By/Relationship daughter   Services At/After Discharge   Services At/After Discharge Home [facility]  ([facility] and [facility])   Condition of Participation: Discharge Planning   The Plan for Transition of Care is related to the following treatment goals: [facility]   The Patient and/or Patient Representative was provided with a Choice of Provider? Patient;Patient Representative   Name of the Patient Representative who was provided with the Choice of Provider and agrees with the Discharge Plan?  dtr Allie   The Patient and/Or Patient Representative agree with the Discharge Plan? Yes   Freedom of Choice list was provided with basic dialogue that supports the patient's individualized plan of care/goals, treatment preferences, and shares the quality data associated with the providers?  Yes

## 2024-04-05 NOTE — PROGRESS NOTES
Pt is going to be discharged home, transport via ambulance. IV removed, dressing in place, no bleeding noted. Pt's daughter is at the bedside and is going to take all of her belongings. Pt denies any needs or pain. Awaiting transport.

## 2024-04-05 NOTE — DISCHARGE SUMMARY
Hospitalist Discharge Summary   Admit Date:  3/28/2024 12:08 PM   DC Note date: 2024  Name:  Lilliam Song   Age:  89 y.o.  Sex:  female  :  1934   MRN:  [id number]   Room:  [facility]  PCP:  Yash Heredia DO    Presenting Complaint: OTHER     Initial Admission Diagnosis: Hypercalcemia [E83.52]  Hyperparathyroidism (HCC) [E21.3]  Anemia, unspecified type [D64.9]     Problem List for this Hospitalization (present on admission):    Principal Problem:    Hypercalcemia  Active Problems:    Glaucoma    Essential hypertension, benign    Osteoporosis    Hyperparathyroidism (HCC)    Pulmonary embolism on left (HCC)    Adult failure to thrive    Macrocytic anemia    Moderate protein-calorie malnutrition (HCC)    Encounter for palliative care    Acute UTI    Constipation    Hyperthyroidism    Pain syndrome, chronic  Resolved Problems:    * No resolved hospital problems. *      Hospital Course:  89 y.o. female with medical history of hyperparathyroidism (on chronic Cinacalcet per endocrinology that she follows with at [facility]), mulinodular goiter, HTN, HLD, PVD, and low-grade papillary tumors of the bladder (recommendation for TURBT but patient and family wishes to opt for monitoring) presents at the request of her PCP, Dr. Heredia, due to worsening fatigue, failure to thrive, and constipation. She was recently hospitalized and discharged from [place] after being diagnosed and started on Eliquis for a pulmonary embolism. She was discharged to SNF but daughter took her home as she was not having a good experience. Since that time, she has essentially been in bed and not able to get up more than once or twice to the recliner. Additionally, she has had constipation for \"weeks\" and has only been able to eat \"half a cup of grits\". She also \"hurts all over, everywhere.\"      ED course: ionized calcium of 5.88. Bili of 2.7. Hgb of 8.5. CXR negative. Hospitalist consulted for admission.     Pt was admitted with acute on  Range    Magnesium 2.0 1.8 - 2.4 mg/dL   CBC    Collection Time: 04/05/24  6:12 AM   Result Value Ref Range    WBC 5.6 4.3 - 11.1 K/uL    RBC 2.52 (L) 4.05 - 5.2 M/uL    Hemoglobin 7.8 (L) 11.7 - 15.4 g/dL    Hematocrit 25.8 (L) 35.8 - 46.3 %    .4 (H) 82 - 102 FL    MCH 31.0 26.1 - 32.9 PG    MCHC 30.2 (L) 31.4 - 35.0 g/dL    RDW 17.9 (H) 11.9 - 14.6 %    Platelets 301 150 - 450 K/uL    MPV 9.8 9.4 - 12.3 FL    nRBC 0.00 0.0 - 0.2 K/uL   Basic Metabolic Panel w/ Reflex to MG    Collection Time: 04/05/24  1:11 PM   Result Value Ref Range    Sodium 142 136 - 146 mmol/L    Potassium 3.1 (L) 3.5 - 5.1 mmol/L    Chloride 112 103 - 113 mmol/L    CO2 25 21 - 32 mmol/L    Anion Gap 5 2 - 11 mmol/L    Glucose 94 65 - 100 mg/dL    BUN 4 (L) 8 - 23 MG/DL    Creatinine 0.60 0.6 - 1.0 MG/DL    Est, Glom Filt Rate 85 >60 ml/min/1.73m2    Calcium 9.2 8.3 - 10.4 MG/DL       No results for input(s): \"COVID19\" in the last 72 hours.    Recent Vital Data:  Patient Vitals for the past 24 hrs:   Temp Pulse Resp BP SpO2   04/05/24 0733 98.2 °F (36.8 °C) 71 18 (!) 186/83 99 %   04/05/24 0400 97.7 °F (36.5 °C) 66 17 (!) 176/60 100 %   04/04/24 1917 98.4 °F (36.9 °C) 67 18 (!) 179/71 94 %   04/04/24 1502 97.5 °F (36.4 °C) 68 22 (!) 177/81 99 %       Oxygen Therapy  SpO2: 99 %  Pulse via Oximetry: 82 beats per minute  O2 Device: None (Room air)    Estimated body mass index is 27.91 kg/m² as calculated from the following:    Height as of this encounter: 1.651 m (5' 5\").    Weight as of this encounter: 76.1 kg (167 lb 11.2 oz).    Intake/Output Summary (Last 24 hours) at 4/5/2024 1430  Last data filed at 4/5/2024 1257  Gross per 24 hour   Intake 4153.92 ml   Output 550 ml   Net 3603.92 ml         Physical Exam:  General:          Elderly, frail and chronically ill appearing  Head:               Normocephalic, atraumatic  Eyes:               Sclerae appear normal.  Pupils equally round.  ENT:                Nares appear normal.  Moist

## 2024-04-07 LAB
BACTERIA SPEC CULT: NORMAL
PTH RELATED PROT SERPL-SCNC: <2 PMOL/L
SERVICE CMNT-IMP: NORMAL

## 2024-04-08 ENCOUNTER — CARE COORDINATION (OUTPATIENT)
Dept: CARE COORDINATION | Facility: CLINIC | Age: 89
End: 2024-04-08

## 2024-04-08 ENCOUNTER — TELEPHONE (OUTPATIENT)
Dept: ONCOLOGY | Age: 89
End: 2024-04-08

## 2024-04-08 DIAGNOSIS — E83.52 HYPERCALCEMIA: Primary | ICD-10-CM

## 2024-04-08 DIAGNOSIS — G89.4 PAIN SYNDROME, CHRONIC: ICD-10-CM

## 2024-04-08 DIAGNOSIS — M25.561 RIGHT KNEE PAIN, UNSPECIFIED CHRONICITY: ICD-10-CM

## 2024-04-08 PROCEDURE — 1111F DSCHRG MED/CURRENT MED MERGE: CPT | Performed by: STUDENT IN AN ORGANIZED HEALTH CARE EDUCATION/TRAINING PROGRAM

## 2024-04-08 RX ORDER — PREGABALIN 25 MG/1
25 CAPSULE ORAL 2 TIMES DAILY
Qty: 60 CAPSULE | Refills: 0 | Status: SHIPPED | OUTPATIENT
Start: 2024-04-08 | End: 2024-04-11 | Stop reason: SDUPTHER

## 2024-04-08 NOTE — TELEPHONE ENCOUNTER
Pt d/c'd from [facility] 4/5 - per discharging provider, pt needs f/u with Jose for Macrocytic anemia. Established pt of Dr. Garcia last seen on 11/9/22. TE entered and routed to [facility] front office for f/u scheduling.

## 2024-04-08 NOTE — TELEPHONE ENCOUNTER
Care Transitions Initial Follow Up Call    Outreach made within 2 business days of discharge: Yes    Patient: Lilliam Song Patient : 1934   MRN: [id number]  Reason for Admission: There are no discharge diagnoses documented for the most recent discharge.  Discharge Date: 24       Spoke with: Allie (daughter)    Discharge department/facility: [facility] Interactive Patient Contact:  Was patient able to fill all prescriptions: No: Lyrica was printed, could not fill printed RX. Encounter sent to PCP.   Was patient instructed to bring all medications to the follow-up visit: Yes  Is patient taking all medications as directed in the discharge summary? Yes  Does patient understand their discharge instructions: Yes  Does patient have questions or concerns that need addressed prior to 7-14 day follow up office visit: no    Scheduled appointment with PCP within 7-14 days    Follow Up  Future Appointments   Date Time Provider Department Center   2024 10:30 AM Kristina Javier APRN - CNP [facility]   5/3/2024 10:25 AM Dayton Drake MD [facility]   5/15/2024 11:15 AM Jhony De MD [facility]   2024  9:45 AM LARISA LAB [facility]   2024 10:40 AM Yash Heredia DO [facility]       DAMARIS BRADLEY MA

## 2024-04-08 NOTE — TELEPHONE ENCOUNTER
Hospital discharge summary reviewed with patient having been prescribed Lyrica 25 mg twice daily for 30 days for knee pain. PDMP reviewed.  Prescription sent to pharmacy on record.    Orders Placed This Encounter    pregabalin (LYRICA) 25 MG capsule     Sig: Take 1 capsule by mouth 2 times daily for 30 days. Max Daily Amount: 50 mg     Dispense:  60 capsule     Refill:  0

## 2024-04-08 NOTE — TELEPHONE ENCOUNTER
Care Transitions Initial Follow Up Call    Outreach made within 2 business days of discharge: Yes    Patient: Lilliam Song Patient : 1934   MRN: [id number]  Reason for Admission: There are no discharge diagnoses documented for the most recent discharge.  Discharge Date: 24       Spoke with: Left voicemail

## 2024-04-08 NOTE — CARE COORDINATION
appointment with PCP-4/18/2024 at 10:30 am  Obtained and reviewed discharge summary and/or continuity of care documents  Communication with home health agencies or other community services the patient is currently using- [facility] to begin resumption of  care 4/92024  [facility] Palliative to call patient's daughter today to schedule visit for start of care.  Daughter reports all DME arrived as ordered  Lyrica Rx in need of prior authorization per daughter and pharmacy. CTN sent inpatient SW an email requesting assistance with obtaining PA from hospitalist.   Offered patient enrollment in the Remote Patient Monitoring (RPM) program for in-home monitoring: Patient declined.    Care Transitions 24 Hour Call    Schedule Follow Up Appointment with PCP: Completed  Do you have a copy of your discharge instructions?: Yes  Do you have all of your prescriptions and are they filled?: No  Have you been contacted by a [facility] Pharmacist?: No  Have you scheduled your follow up appointment?: Yes  How are you going to get to your appointment?: Car - family or friend to transport  Do you have support at home?: Child  Do you feel like you have everything you need to keep you well at home?: Yes  Are you an active caregiver in your home?: No  Care Transitions Interventions    Physical Therapy: Completed       DME Assistance: Completed    Occupational Therapy: Completed      Palliative Care: Completed              Discussed follow-up appointments. If no appointment was previously scheduled, appointment scheduling offered: Yes.   Is follow up appointment scheduled within 14 days of discharge? Yes.    Follow Up  Future Appointments   Date Time Provider Department Center   4/18/2024 10:30 AM Kristina Javier APRN - CNP [facility]   5/3/2024 10:25 AM Dayton Drake MD [facility]   5/15/2024 11:15 AM Jhony De MD [facility]   5/21/2024  9:45 AM MAT [facility]   5/28/2024 10:40 AM Yash Heredia DO [facility]       Care

## 2024-04-08 NOTE — TELEPHONE ENCOUNTER
Pharmacy would not fill Lyrica via paper RX. Patients daughter is requesting lyrica be resent electronically. They have already contact ED and it was not resent electronically.

## 2024-04-09 ENCOUNTER — TELEPHONE (OUTPATIENT)
Dept: INTERNAL MEDICINE CLINIC | Facility: CLINIC | Age: 89
End: 2024-04-09

## 2024-04-09 ENCOUNTER — HOME CARE VISIT (OUTPATIENT)
Dept: SCHEDULING | Facility: HOME HEALTH | Age: 89
End: 2024-04-09
Payer: MEDICARE

## 2024-04-09 VITALS
DIASTOLIC BLOOD PRESSURE: 82 MMHG | RESPIRATION RATE: 16 BRPM | SYSTOLIC BLOOD PRESSURE: 160 MMHG | TEMPERATURE: 98.7 F | OXYGEN SATURATION: 97 % | HEART RATE: 78 BPM

## 2024-04-09 DIAGNOSIS — M25.561 RECURRENT PAIN OF BOTH KNEES: Primary | ICD-10-CM

## 2024-04-09 DIAGNOSIS — M25.562 RECURRENT PAIN OF BOTH KNEES: Primary | ICD-10-CM

## 2024-04-09 PROCEDURE — G0299 HHS/HOSPICE OF RN EA 15 MIN: HCPCS

## 2024-04-09 RX ORDER — NALOXONE HYDROCHLORIDE 4 MG/.1ML
SPRAY NASAL
Qty: 2 EACH | Refills: 5 | Status: SHIPPED | OUTPATIENT
Start: 2024-04-09

## 2024-04-09 RX ORDER — HYDROCODONE BITARTRATE AND ACETAMINOPHEN 7.5; 325 MG/1; MG/1
1 TABLET ORAL EVERY 8 HOURS PRN
Qty: 30 TABLET | Refills: 0 | Status: SHIPPED | OUTPATIENT
Start: 2024-04-09 | End: 2024-04-19

## 2024-04-09 ASSESSMENT — ENCOUNTER SYMPTOMS
STOOL DESCRIPTION: LOOSE
DYSPNEA ACTIVITY LEVEL: AFTER AMBULATING LESS THAN 10 FT
HEMOPTYSIS: 0

## 2024-04-09 NOTE — TELEPHONE ENCOUNTER
Pt's daughter is calling the office to inform Dr. Heredia that the PA for lyrica was denied. Please advise.

## 2024-04-09 NOTE — TELEPHONE ENCOUNTER
Angela from [facility] called  her number is [phone]    Went to visit patient, seems more hospice,  but patient daughter would like a physical therapy evaluation,  If physical therapy feels she needs hospice, daughter will proceed.    She is having breakdown on her heels, needs an airflow overlay mattress    They are using [employer]    Daughter tried to get her Lyrica, but it has been denied, daughter doesn't know if if was denied from Dr or Insurance    Pain is not controlled, pt only has Tramadol in the house and patient is in pain and moaning.

## 2024-04-09 NOTE — TELEPHONE ENCOUNTER
Spoke to Pts daughter. She does not wish to schedule at this time due to mom's condition. May seek hospice care in near future. Advised daughter to call back if they decide to schedule.

## 2024-04-09 NOTE — CASE COMMUNICATION
SN for ELIZ    Admitted to [facility] MS on 03/28/2024 for hypercalcemia, hyperparathyroidism and anemia.  In the ED she presented at the request of her PCP, Dr. Heredia, due to worsening fatigue, failure to thrive, and constipation. She was recently hospitalized and discharged from [place] after being diagnosed and started on Eliquis for a pulmonary embolism. She was discharged to SNF but daughter took her home as she was not having a go od experience. Since that time, she has essentially been in bed and not able to get up more than once or twice to the recliner. Additionally, she has had constipation for \"weeks\" and has only been able to eat \"half a cup of grits\". She also \"hurts all over, everywhere.\"  Pt was admitted with acute on chronic hypercalcemia most likely d/t hyperparathyroidism and dehydration contributing. Pt received IVF, Zoledronic acid with resolution o f hypercalcemia. She was also found to have Coag negative staph UTI for which she was treated with Amoxicillin inpatient. Repeat UA 4/4 was negative for UTI. No urinary symptoms.    SN met at door by pt daughter Allie.  Pt son also present for snv.  Pt lying in hospital bed in den.  Pt is responsive to sn but does not participate with questioning.  Pt moans intermittently thru out snv.  Cg states pain management has been ongoing probl em. Cg called MD office earlier today requesting Lyrica as pt used to take this for pain.  Sn instructs cg to give Tramadol as instructed on bottle as too much can cause opiod overdose.  Pt has pressure injury to sacrum and both heels.  Pt in need of pressure reducing air matttress.  Sn unable to measure wounds as pt has low pain tolerance when moving tranferring to her side or lifiting her legs. SN discusses hospice with pt daughter as  pt is at high level of care and high risk for rehospitalization.  Cg requests PT eval.  If PT unable to participate in home PT cg agreeable to hospice eval.  SN notifies Dr. Heredia

## 2024-04-09 NOTE — TELEPHONE ENCOUNTER
Person spoke with patient's daughter who came by the office today.  Patient's daughter is leaning more towards hospice.  Has hospital follow-up and the next week or so.  Patient's pain is primarily in the knees, not controlled with tramadol.  Lyrica was denied by insurance given patient does not have neuropathy.  Was receiving hydrocodone 7.5 mg in the hospital which daughter states that she tolerated.  Patient on an aggressive bowel regiment and no current constipation.    Orders Placed This Encounter    HYDROcodone-acetaminophen (NORCO) 7.5-325 MG per tablet     Sig: Take 1 tablet by mouth every 8 hours as needed for Pain for up to 10 days. Take in place of tramadol.  Keep total dose of acetaminophen less than 4000 mg per 24 hours.  Do not drink alcohol, drive or operate heavy machinery after use. Max Daily Amount: 3 tablets     Dispense:  30 tablet     Refill:  0     Reduce doses taken as pain becomes manageable    naloxone (NARCAN) 4 MG/0.1ML LIQD nasal spray     Sig: Use 1 spray intranasally at onset of opioid overdose.  May repeat dose every 2 minutes as needed should symptoms persist or recur.     Dispense:  2 each     Refill:  5

## 2024-04-10 ENCOUNTER — HOME CARE VISIT (OUTPATIENT)
Dept: SCHEDULING | Facility: HOME HEALTH | Age: 89
End: 2024-04-10
Payer: MEDICARE

## 2024-04-10 VITALS
DIASTOLIC BLOOD PRESSURE: 72 MMHG | OXYGEN SATURATION: 98 % | HEART RATE: 63 BPM | SYSTOLIC BLOOD PRESSURE: 124 MMHG | TEMPERATURE: 98.1 F | RESPIRATION RATE: 18 BRPM

## 2024-04-10 PROCEDURE — G0151 HHCP-SERV OF PT,EA 15 MIN: HCPCS

## 2024-04-10 PROCEDURE — G0152 HHCP-SERV OF OT,EA 15 MIN: HCPCS

## 2024-04-10 ASSESSMENT — ENCOUNTER SYMPTOMS
PAIN LOCATION - PAIN QUALITY: SENSITIVE
DYSPNEA ACTIVITY LEVEL: AFTER AMBULATING LESS THAN 10 FT

## 2024-04-11 ENCOUNTER — HOME CARE VISIT (OUTPATIENT)
Dept: SCHEDULING | Facility: HOME HEALTH | Age: 89
End: 2024-04-11
Payer: MEDICARE

## 2024-04-11 ENCOUNTER — CARE COORDINATION (OUTPATIENT)
Dept: CARE COORDINATION | Facility: CLINIC | Age: 89
End: 2024-04-11

## 2024-04-11 ENCOUNTER — TELEPHONE (OUTPATIENT)
Dept: INTERNAL MEDICINE CLINIC | Facility: CLINIC | Age: 89
End: 2024-04-11

## 2024-04-11 VITALS
DIASTOLIC BLOOD PRESSURE: 90 MMHG | OXYGEN SATURATION: 97 % | RESPIRATION RATE: 18 BRPM | SYSTOLIC BLOOD PRESSURE: 160 MMHG | HEART RATE: 74 BPM | TEMPERATURE: 97.8 F

## 2024-04-11 VITALS
HEART RATE: 64 BPM | SYSTOLIC BLOOD PRESSURE: 118 MMHG | RESPIRATION RATE: 16 BRPM | OXYGEN SATURATION: 98 % | TEMPERATURE: 97.7 F | DIASTOLIC BLOOD PRESSURE: 74 MMHG

## 2024-04-11 DIAGNOSIS — G89.4 PAIN SYNDROME, CHRONIC: ICD-10-CM

## 2024-04-11 DIAGNOSIS — E21.3 HYPERPARATHYROIDISM (HCC): Primary | ICD-10-CM

## 2024-04-11 DIAGNOSIS — M25.561 RIGHT KNEE PAIN, UNSPECIFIED CHRONICITY: ICD-10-CM

## 2024-04-11 PROCEDURE — G0299 HHS/HOSPICE OF RN EA 15 MIN: HCPCS

## 2024-04-11 RX ORDER — PREGABALIN 25 MG/1
25 CAPSULE ORAL 2 TIMES DAILY
Qty: 60 CAPSULE | Refills: 1 | Status: SHIPPED | OUTPATIENT
Start: 2024-04-11 | End: 2024-06-10

## 2024-04-11 ASSESSMENT — ENCOUNTER SYMPTOMS
CONSTIPATION: 1
DOUBLE VISION: 0

## 2024-04-11 NOTE — CARE COORDINATION
Care Transitions Follow Up Call    Patient Current Location:  Home: [address] [place] [address] [place] [address]    [facility] Care Coordinator contacted the family by telephone to follow up after admission on 3/28/24.  Verified name and  with family as identifiers.    Patient: Lilliam Song  Patient : 1934   MRN: [id number]  Reason for Admission: hypercalcemia   Discharge Date: 24 RARS: Readmission Risk Score: 23.6      Needs to be reviewed by the provider   Additional needs identified to be addressed with provider: No  none             Method of communication with provider: none.    Spoke with patients daughter who reports her mother is having pain in her legs and woke up crying out at 5am.  Patients daughter has been in contact with Dr. Levine office and patient is now taking Norco for pain.  [facility] RN is to visit today.  Daughter is thinking of transferring to hospice at this time.  Daughter is awaiting prior auth for Lyrica and has notified PCP.    Follow Up  Future Appointments   Date Time Provider Department Center   2024  1:00 PM Charline Watts RN [facility] HOME HEAL   2024 12:30 PM Charla Burger PTA [facility] HOME HEAL   4/15/2024 To Be Determined Charline Watts RN [facility] HOME HEAL   2024 To Be Determined Teresa Purdy OTA [facility] HOME HEAL   2024 To Be Determined Charla Burger PTA [facility] HOME HEAL   2024 To Be Determined Charline Watts RN [facility] HOME HEAL   2024 To Be Determined Teresa Purdy OTA [facility] HOME HEAL   2024 10:30 AM Kristina Javier APRN - CNP [facility]   2024 To Be Determined Charla Burger PTA [facility] HOME HEAL   2024 To Be Determined Charline Watts RN [facility] HOME HEAL   2024 To Be Determined Kian Laughlin, PT [facility] HOME HEAL   2024 To Be Determined Teresa Purdy OTA [facility] HOME HEAL   2024 To Be Determined Charline Watts RN [facility] HOME

## 2024-04-11 NOTE — TELEPHONE ENCOUNTER
Extensive conversation had with patient's daughter regarding request for Lyrica.  Patient's daughter was able to confirm patient's date of birth.  Expressed my concerns about patient being on Lyrica in addition to hydrocodone given age, comorbidities, increased risk for falls, confusion, aspiration.  Daughter stated that during patient's recent hospitalization she was being given low-dose Lyrica twice daily in addition to as needed hydrocodone or morphine and tolerated (Hospital notes reviewed showing patient did have these medications available to be administered).  Patient currently has home health with early stages of physical therapy.  Daughter is considering hospice and has upcoming evaluation with palliative care as well as hospice in the next week or so.  Patient's daughter is still figuring out how she will get patient to the office for her hospital follow-up.  Requesting if home health nurse can draw labs to further assist in her decision about whether or not they will pursue hospice.  Lab orders to be placed.  Patient experiences significant debilitating pain with routine care throughout the day especially in her legs.  Is helped by hydrocodone but patient still with breakthrough pain.  Discussed with patient's daughter that I would be willing to write for low-dose Lyrica up to twice a day but to start off with just once a day to see how patient tolerates it given she is also on as needed hydrocodone, clarifying that the 2 medications are not to be given simultaneously but to be  by several hours.  Daughter does have prescription for Narcan reversal agent that was previously sent in.  I educated on appropriate administration/use of this medication.  Daughter verbalized understanding with plan of care.  I personally called [facility] pharmacy that Lyrica was originally sent to avoid prescription prior to it being sent in to [facility] as patient's daughter will use [facility] and pay

## 2024-04-11 NOTE — TELEPHONE ENCOUNTER
Sandrita from  [facility] called, requ referral for pt to  Hospice. States she will call intake dept and let them know to expect referral.    Pt daughter been speaking about lyrica w pcp, nurse states pt family ok w/ paying out of pocket if it can be sent into pharmacy

## 2024-04-12 ENCOUNTER — HOME CARE VISIT (OUTPATIENT)
Dept: SCHEDULING | Facility: HOME HEALTH | Age: 89
End: 2024-04-12
Payer: MEDICARE

## 2024-04-12 ENCOUNTER — HOME CARE VISIT (OUTPATIENT)
Dept: HOSPICE | Facility: HOSPICE | Age: 89
End: 2024-04-12

## 2024-04-12 ENCOUNTER — TELEPHONE (OUTPATIENT)
Dept: INTERNAL MEDICINE CLINIC | Facility: CLINIC | Age: 89
End: 2024-04-12

## 2024-04-12 ENCOUNTER — CARE COORDINATION (OUTPATIENT)
Dept: CARE COORDINATION | Facility: CLINIC | Age: 89
End: 2024-04-12

## 2024-04-12 DIAGNOSIS — E21.3 HYPERPARATHYROIDISM (HCC): ICD-10-CM

## 2024-04-12 DIAGNOSIS — E83.52 HYPERCALCEMIA: ICD-10-CM

## 2024-04-12 DIAGNOSIS — I26.99 ACUTE PULMONARY EMBOLISM WITHOUT ACUTE COR PULMONALE, UNSPECIFIED PULMONARY EMBOLISM TYPE (HCC): ICD-10-CM

## 2024-04-12 DIAGNOSIS — G89.4 PAIN SYNDROME, CHRONIC: Primary | ICD-10-CM

## 2024-04-12 ASSESSMENT — ENCOUNTER SYMPTOMS
PAIN LOCATION - PAIN QUALITY: ACHING
DYSPNEA ACTIVITY LEVEL: AFTER AMBULATING LESS THAN 10 FT
STOOL DESCRIPTION: MUSHY

## 2024-04-12 NOTE — TELEPHONE ENCOUNTER
Charline from [facility] is calling the office to ask if the patient can have her labs drawn on Monday during her home health visit. Pt is currently scheduled for a PT home health visit on 4/12. Please advise.

## 2024-04-12 NOTE — TELEPHONE ENCOUNTER
----- Message from Lilliam Song sent at 4/11/2024  2:08 PM EDT -----  Regarding: Awakened this morning mom in excruciating pain  Contact: [phone]  Ok can you please send the prescription for Pregablin  25 mg  60 caps to [facility] on [address]. I will use [employer] to pay for the meds. Thanks, Allie Melton

## 2024-04-12 NOTE — TELEPHONE ENCOUNTER
Home health nurse at request of daughter requesting referral for [facility] for evaluation.  Referral order placed.    Orders Placed This Encounter    External Referral to Hospice     Referral Priority:   Routine     Referral Type:   Hospice     Referral Reason:   Specialty Services Required     Requested Specialty:   Hospice and Palliative Medicine     Number of Visits Requested:   1

## 2024-04-12 NOTE — CARE COORDINATION
Care Transitions Follow Up Call    Patient Current Location:  Home: [address] [place] [address] [place] [address]    [facility] Care Coordinator contacted the family by telephone to follow up after admission on 3/28/24.  Verified name and  with family as identifiers.    Patient: Lilliam Song  Patient : 1934   MRN: [id number]  Reason for Admission: hypercalcemia  Discharge Date: 24 RARS: Readmission Risk Score: 23.6    Outreach to patient's daughter, Allie to follow up after our conversation on 24.  Allie states Dr. Heredia did contact her by phone yesterday, 24 and addressed all concerns.  Dr. Heredia spoke with her at length regarding his hesitation to prescribe Lyrica but did agree to prescribe.  Daughter will use [facility] and pay out of pocket since the prior authorization was denied.   Patient's daughter states she fully understands that Lyrica and Norco should be given several hours apart and never simultaneously.   Allie states [facility] will be out within the hour to evaluate patient.  Will continue to update for decision regarding hospice admission.    Follow Up  Future Appointments   Date Time Provider Department Center   2024 To Be Determined Alexa Velasquez RN [facility] HOME [facility]   4/15/2024 To Be Determined Charline Watts RN [facility]   2024 To Be Determined Teresa Purdy OTA [facility]   2024 To Be Determined Charla Burger PTA [facility]   2024 To Be Determined Charline Watts RN [facility]   2024 To Be Determined Teresa Purdy OTA [facility]   2024 10:30 AM Kristina Javier APRN - CNP [facility]   2024 To Be Determined Charla Burger PTA [facility]   2024 To Be Determined Charline Watts RN [facility]   2024 To Be Determined Kian Laughlin, PT [facility]   2024 To Be Determined Teresa Purdy OTA [facility]

## 2024-04-15 ENCOUNTER — HOME CARE VISIT (OUTPATIENT)
Dept: SCHEDULING | Facility: HOME HEALTH | Age: 89
End: 2024-04-15
Payer: MEDICARE

## 2024-04-15 ENCOUNTER — HOSPITAL ENCOUNTER (OUTPATIENT)
Dept: LAB | Age: 89
Setting detail: SPECIMEN
Discharge: HOME | End: 2024-04-18
Payer: MEDICARE

## 2024-04-15 VITALS
SYSTOLIC BLOOD PRESSURE: 112 MMHG | DIASTOLIC BLOOD PRESSURE: 56 MMHG | OXYGEN SATURATION: 96 % | HEART RATE: 52 BPM | RESPIRATION RATE: 18 BRPM | TEMPERATURE: 97.4 F

## 2024-04-15 VITALS
RESPIRATION RATE: 18 BRPM | OXYGEN SATURATION: 96 % | DIASTOLIC BLOOD PRESSURE: 52 MMHG | TEMPERATURE: 98 F | SYSTOLIC BLOOD PRESSURE: 128 MMHG | HEART RATE: 62 BPM

## 2024-04-15 DIAGNOSIS — E21.3 HYPERPARATHYROIDISM (HCC): ICD-10-CM

## 2024-04-15 DIAGNOSIS — I26.99 ACUTE PULMONARY EMBOLISM WITHOUT ACUTE COR PULMONALE, UNSPECIFIED PULMONARY EMBOLISM TYPE (HCC): Primary | ICD-10-CM

## 2024-04-15 LAB
ALBUMIN SERPL-MCNC: 2.2 G/DL (ref 3.2–4.6)
ALBUMIN/GLOB SERPL: 0.7 (ref 0.4–1.6)
ALP SERPL-CCNC: 84 U/L (ref 50–136)
ALT SERPL-CCNC: 10 U/L (ref 12–65)
ANION GAP SERPL CALC-SCNC: 4 MMOL/L (ref 2–11)
AST SERPL-CCNC: 13 U/L (ref 15–37)
BASOPHILS # BLD: 0 K/UL (ref 0–0.2)
BASOPHILS NFR BLD: 1 % (ref 0–2)
BILIRUB SERPL-MCNC: 0.4 MG/DL (ref 0.2–1.1)
BUN SERPL-MCNC: 5 MG/DL (ref 8–23)
CA-I BLD-MCNC: 5.11 MG/DL (ref 4–5.2)
CALCIUM SERPL-MCNC: 9.5 MG/DL (ref 8.3–10.4)
CALCIUM SERPL-MCNC: 9.5 MG/DL (ref 8.3–10.4)
CHLORIDE SERPL-SCNC: 110 MMOL/L (ref 103–113)
CO2 SERPL-SCNC: 30 MMOL/L (ref 21–32)
CREAT SERPL-MCNC: 0.65 MG/DL (ref 0.6–1)
DIFFERENTIAL METHOD BLD: ABNORMAL
EOSINOPHIL # BLD: 0.1 K/UL (ref 0–0.8)
EOSINOPHIL NFR BLD: 4 % (ref 0.5–7.8)
ERYTHROCYTE [DISTWIDTH] IN BLOOD BY AUTOMATED COUNT: 16.3 % (ref 11.9–14.6)
GLOBULIN SER CALC-MCNC: 3 G/DL (ref 2.8–4.5)
GLUCOSE SERPL-MCNC: 109 MG/DL (ref 65–100)
HCT VFR BLD AUTO: 28.7 % (ref 35.8–46.3)
HGB BLD-MCNC: 8.6 G/DL (ref 11.7–15.4)
IMM GRANULOCYTES # BLD AUTO: 0 K/UL (ref 0–0.5)
IMM GRANULOCYTES NFR BLD AUTO: 0 % (ref 0–5)
LYMPHOCYTES # BLD: 1.1 K/UL (ref 0.5–4.6)
LYMPHOCYTES NFR BLD: 30 % (ref 13–44)
MCH RBC QN AUTO: 29.9 PG (ref 26.1–32.9)
MCHC RBC AUTO-ENTMCNC: 30 G/DL (ref 31.4–35)
MCV RBC AUTO: 99.7 FL (ref 82–102)
MONOCYTES # BLD: 0.4 K/UL (ref 0.1–1.3)
MONOCYTES NFR BLD: 12 % (ref 4–12)
NEUTS SEG # BLD: 2 K/UL (ref 1.7–8.2)
NEUTS SEG NFR BLD: 54 % (ref 43–78)
NRBC # BLD: 0 K/UL (ref 0–0.2)
PHOSPHATE SERPL-MCNC: 2.1 MG/DL (ref 2.3–3.7)
PLATELET # BLD AUTO: 318 K/UL (ref 150–450)
PMV BLD AUTO: 10.1 FL (ref 9.4–12.3)
POTASSIUM SERPL-SCNC: 3.6 MMOL/L (ref 3.5–5.1)
PROT SERPL-MCNC: 5.2 G/DL (ref 6.3–8.2)
PTH-INTACT SERPL-MCNC: 84.3 PG/ML (ref 18.5–88)
RBC # BLD AUTO: 2.88 M/UL (ref 4.05–5.2)
SODIUM SERPL-SCNC: 144 MMOL/L (ref 136–146)
WBC # BLD AUTO: 3.8 K/UL (ref 4.3–11.1)

## 2024-04-15 PROCEDURE — 82306 VITAMIN D 25 HYDROXY: CPT

## 2024-04-15 PROCEDURE — 80053 COMPREHEN METABOLIC PANEL: CPT

## 2024-04-15 PROCEDURE — G0151 HHCP-SERV OF PT,EA 15 MIN: HCPCS

## 2024-04-15 PROCEDURE — 84100 ASSAY OF PHOSPHORUS: CPT

## 2024-04-15 PROCEDURE — G0299 HHS/HOSPICE OF RN EA 15 MIN: HCPCS

## 2024-04-15 PROCEDURE — 83970 ASSAY OF PARATHORMONE: CPT

## 2024-04-15 PROCEDURE — 85025 COMPLETE CBC W/AUTO DIFF WBC: CPT

## 2024-04-15 PROCEDURE — 82330 ASSAY OF CALCIUM: CPT

## 2024-04-15 ASSESSMENT — ENCOUNTER SYMPTOMS
PAIN LOCATION - PAIN QUALITY: ACHY
DYSPNEA ACTIVITY LEVEL: AFTER AMBULATING LESS THAN 10 FT
PAIN LOCATION - PAIN QUALITY: SENSITIVE
STOOL DESCRIPTION: SOFT FORMED

## 2024-04-15 NOTE — TELEPHONE ENCOUNTER
Refill of eliquis sent to pharmacy     Orders Placed This Encounter    apixaban (ELIQUIS) 5 MG TABS tablet     Sig: Take 1 tablet by mouth 2 times daily     Dispense:  60 tablet     Refill:  0

## 2024-04-16 ENCOUNTER — HOME CARE VISIT (OUTPATIENT)
Dept: SCHEDULING | Facility: HOME HEALTH | Age: 89
End: 2024-04-16
Payer: MEDICARE

## 2024-04-16 LAB — 25(OH)D3 SERPL-MCNC: 42.1 NG/ML (ref 30–100)

## 2024-04-16 PROCEDURE — G0158 HHC OT ASSISTANT EA 15: HCPCS

## 2024-04-17 ENCOUNTER — HOME CARE VISIT (OUTPATIENT)
Dept: HOSPICE | Facility: HOSPICE | Age: 89
End: 2024-04-17

## 2024-04-17 ENCOUNTER — HOME CARE VISIT (OUTPATIENT)
Dept: SCHEDULING | Facility: HOME HEALTH | Age: 89
End: 2024-04-17
Payer: MEDICARE

## 2024-04-17 VITALS
HEART RATE: 60 BPM | OXYGEN SATURATION: 96 % | RESPIRATION RATE: 17 BRPM | DIASTOLIC BLOOD PRESSURE: 72 MMHG | TEMPERATURE: 98 F | SYSTOLIC BLOOD PRESSURE: 138 MMHG

## 2024-04-17 VITALS
RESPIRATION RATE: 16 BRPM | OXYGEN SATURATION: 96 % | HEART RATE: 68 BPM | SYSTOLIC BLOOD PRESSURE: 130 MMHG | TEMPERATURE: 98.1 F | DIASTOLIC BLOOD PRESSURE: 72 MMHG

## 2024-04-17 PROCEDURE — G0157 HHC PT ASSISTANT EA 15: HCPCS

## 2024-04-17 PROCEDURE — G0299 HHS/HOSPICE OF RN EA 15 MIN: HCPCS

## 2024-04-18 ENCOUNTER — TELEMEDICINE (OUTPATIENT)
Dept: INTERNAL MEDICINE CLINIC | Facility: CLINIC | Age: 89
End: 2024-04-18

## 2024-04-18 VITALS
RESPIRATION RATE: 18 BRPM | DIASTOLIC BLOOD PRESSURE: 90 MMHG | TEMPERATURE: 97.3 F | SYSTOLIC BLOOD PRESSURE: 170 MMHG | OXYGEN SATURATION: 95 % | HEART RATE: 64 BPM

## 2024-04-18 DIAGNOSIS — K59.00 CONSTIPATION, UNSPECIFIED CONSTIPATION TYPE: ICD-10-CM

## 2024-04-18 DIAGNOSIS — D64.9 ANEMIA, UNSPECIFIED TYPE: ICD-10-CM

## 2024-04-18 DIAGNOSIS — E83.52 HYPERCALCEMIA: ICD-10-CM

## 2024-04-18 DIAGNOSIS — M25.562 RECURRENT PAIN OF BOTH KNEES: ICD-10-CM

## 2024-04-18 DIAGNOSIS — E21.3 HYPERPARATHYROIDISM (HCC): ICD-10-CM

## 2024-04-18 DIAGNOSIS — I26.99 PULMONARY EMBOLISM ON LEFT (HCC): ICD-10-CM

## 2024-04-18 DIAGNOSIS — M25.561 RECURRENT PAIN OF BOTH KNEES: ICD-10-CM

## 2024-04-18 DIAGNOSIS — Z09 HOSPITAL DISCHARGE FOLLOW-UP: Primary | ICD-10-CM

## 2024-04-18 RX ORDER — HYDROCODONE BITARTRATE AND ACETAMINOPHEN 7.5; 325 MG/1; MG/1
1 TABLET ORAL EVERY 8 HOURS PRN
Qty: 30 TABLET | Refills: 0 | OUTPATIENT
Start: 2024-04-18 | End: 2024-04-28

## 2024-04-18 RX ORDER — POLYETHYLENE GLYCOL 3350 17 G/17G
17 POWDER, FOR SOLUTION ORAL DAILY
Qty: 527 G | Refills: 1 | Status: SHIPPED | OUTPATIENT
Start: 2024-04-18

## 2024-04-18 ASSESSMENT — ENCOUNTER SYMPTOMS
VOMITING: 0
NAUSEA: 0
DIARRHEA: 0
CONSTIPATION: 1
PAIN LOCATION - PAIN QUALITY: ACHING
COUGH: 0
SHORTNESS OF BREATH: 0
CONSTIPATION: 1
WHEEZING: 0
ABDOMINAL PAIN: 0
DYSPNEA ACTIVITY LEVEL: AFTER AMBULATING MORE THAN 20 FT

## 2024-04-18 NOTE — PROGRESS NOTES
Soln  Commonly known as: ALPHAGAN P     cinacalcet 30 MG tablet  Commonly known as: SENSIPAR     COD LIVER OIL PO     diclofenac sodium 1 % Gel  Commonly known as: VOLTAREN  Apply 2 g topically 4 times daily as needed (for knee and ankle pain)     dorzolamide-timolol 2-0.5 % ophthalmic solution  Commonly known as: COSOPT     ECHINACEA PO     fexofenadine 180 MG tablet  Commonly known as: ALLEGRA     FISH OIL PO     * HYDROcodone-acetaminophen 7.5-325 MG per tablet  Commonly known as: Norco  Take 1 tablet by mouth every 8 hours as needed for Pain for up to 10 days. Take in place of tramadol.  Keep total dose of acetaminophen less than 4000 mg per 24 hours.  Do not drink alcohol, drive or operate heavy machinery after use. Max Daily Amount: 3 tablets     * HYDROcodone-acetaminophen 5-325 MG per tablet  Commonly known as: NORCO     JOINT SUPPORT FORMULA PO     latanoprost 0.005 % ophthalmic solution  Commonly known as: XALATAN     methIMAzole 5 MG tablet  Commonly known as: TAPAZOLE     metoprolol tartrate 25 MG tablet  Commonly known as: LOPRESSOR  Take 1 tablet by mouth 2 times daily     montelukast 10 MG tablet  Commonly known as: SINGULAIR     ondansetron 4 MG tablet  Commonly known as: ZOFRAN  Take 1 tablet by mouth every 8 hours as needed for Nausea or Vomiting     potassium chloride 20 MEQ extended release tablet  Commonly known as: KLOR-CON M  Take 1 tablet by mouth daily for 7 days     pravastatin 10 MG tablet  Commonly known as: PRAVACHOL  Take 1 tablet by mouth daily     pregabalin 25 MG capsule  Commonly known as: LYRICA  Take 1 capsule by mouth 2 times daily for 60 days. Separate from pain medication by at least several hours.  Do not drink alcohol, drive or operate heavy machinery after use. Max Daily Amount: 50 mg     vitamin C 500 MG tablet  Commonly known as: ASCORBIC ACID     * vitamin D 25 MCG (1000 UT) Caps     * Vitamin D3 73442 UNIT Caps  Generic drug: vitamin D           * This list has 4

## 2024-04-18 NOTE — TELEPHONE ENCOUNTER
Patient is requesting refill on Norco, please.     Per PDMP last fill date: 4/9/24 for a 10 day supply.      Last appointment: today    Next appointment: 5/28/24    Refill pended for today.

## 2024-04-19 ENCOUNTER — HOME CARE VISIT (OUTPATIENT)
Dept: SCHEDULING | Facility: HOME HEALTH | Age: 89
End: 2024-04-19
Payer: MEDICARE

## 2024-04-19 ENCOUNTER — TELEPHONE (OUTPATIENT)
Dept: INTERNAL MEDICINE CLINIC | Facility: CLINIC | Age: 89
End: 2024-04-19

## 2024-04-19 ENCOUNTER — CARE COORDINATION (OUTPATIENT)
Dept: CARE COORDINATION | Facility: CLINIC | Age: 89
End: 2024-04-19

## 2024-04-19 VITALS
OXYGEN SATURATION: 96 % | SYSTOLIC BLOOD PRESSURE: 120 MMHG | HEART RATE: 82 BPM | DIASTOLIC BLOOD PRESSURE: 80 MMHG | RESPIRATION RATE: 16 BRPM | TEMPERATURE: 97.6 F

## 2024-04-19 DIAGNOSIS — M25.561 RECURRENT PAIN OF BOTH KNEES: ICD-10-CM

## 2024-04-19 DIAGNOSIS — M25.562 RECURRENT PAIN OF BOTH KNEES: ICD-10-CM

## 2024-04-19 PROCEDURE — G0158 HHC OT ASSISTANT EA 15: HCPCS

## 2024-04-19 RX ORDER — HYDROCODONE BITARTRATE AND ACETAMINOPHEN 7.5; 325 MG/1; MG/1
1 TABLET ORAL EVERY 8 HOURS PRN
Qty: 21 TABLET | Refills: 0 | Status: SHIPPED | OUTPATIENT
Start: 2024-04-19 | End: 2024-04-26

## 2024-04-19 ASSESSMENT — ENCOUNTER SYMPTOMS: PAIN LOCATION - PAIN QUALITY: ACHE

## 2024-04-19 NOTE — TELEPHONE ENCOUNTER
Patient's daughter requesting refill of pain medication for the patient.  She was updated that the hydrocodone was not intended to be a long-term medication.  Patient has been evaluated by hospice but daughter has decided to proceed with home health care.  I would prefer to avoid long-term narcotic pain medication given patient's age, use of Lyrica and other comorbidities.  Recommended pain management evaluation for which they are agreeable.  Referral placed.  Previously provided prescription for Narcan reversal agent. PDMP reviewed showing hydrocodone with acetaminophen 7.5-325 mg tablets, quantity #30, 10-day supply last filled on 4/9/2024.    Orders Placed This Encounter    External Referral To Pain Medicine     Referral Priority:   Routine     Referral Type:   Eval and Treat     Referral Reason:   Specialty Services Required     Requested Specialty:   Pain Medicine     Number of Visits Requested:   1    HYDROcodone-acetaminophen (NORCO) 7.5-325 MG per tablet     Sig: Take 1 tablet by mouth every 8 hours as needed for Pain for up to 7 days. Take in place of tramadol.  Keep total dose of acetaminophen less than 4000 mg per 24 hours.  Do not drink alcohol, drive or operate heavy machinery after use. Max Daily Amount: 3 tablets     Dispense:  21 tablet     Refill:  0     Reduce doses taken as pain becomes manageable

## 2024-04-19 NOTE — CARE COORDINATION
Care Transitions Follow Up Call    Patient Current Location:  Home: [address] [place] [address] [place] [address]    [facility] Care Coordinator contacted the patient by telephone to follow up after admission on 3/28/24.  Verified name and  with family as identifiers.    Patient: Lilliam Song  Patient : 1934   MRN: [id number]  Reason for Admission: hypercalcemia  Discharge Date: 24 RARS: Readmission Risk Score: 23.6      Needs to be reviewed by the provider   Additional needs identified to be addressed with provider: No  none             Method of communication with provider: none.    Spoke with patient's daughter, Allie who reports her mother is doing ok today.  Patient has been taking Lyrica which her daughter reports seems to help along with [place].  Pt had virtual f/u with PCP (NP) on 24.   Noted per chart review Dr. Heredia will fill Norco one time then refer to palliative for ongoing pain management.  Allie voices understanding.  No questions or concerns voiced at time of call.    Follow Up  Future Appointments   Date Time Provider Department Center   2024  2:00 PM Teresa Purdy OTA [facility] HOME [facility]AL   2024 To Be Determined Charline Watts RN [facility] HEAL   2024 To Be Determined Michelle Lagos, PT [facility] HEAL   2024 To Be Determined Teresa Purdy MELI [facility]AL   2024 To Be Determined Charline Watts RN [facility]   2024 To Be Determined Charline Watts RN [facility]AL   2024 To Be Determined Teresa Purdy MELI [facility]   2024 To Be Determined Charline Watts RN [facility]AL   5/3/2024 10:25 AM Dayton Drake MD [facility]   2024 To Be Determined Charline Watts RN [facility] HEAL   2024 To Be Determined Cayden Hansen, OT [facility]   2024 To Be Determined Charline Watts RN [facility]   2024 To Be Determined Stefanie,

## 2024-04-22 ENCOUNTER — HOME CARE VISIT (OUTPATIENT)
Dept: SCHEDULING | Facility: HOME HEALTH | Age: 89
End: 2024-04-22
Payer: MEDICARE

## 2024-04-22 VITALS
OXYGEN SATURATION: 97 % | DIASTOLIC BLOOD PRESSURE: 84 MMHG | HEART RATE: 60 BPM | TEMPERATURE: 97 F | SYSTOLIC BLOOD PRESSURE: 124 MMHG | RESPIRATION RATE: 18 BRPM

## 2024-04-22 PROCEDURE — G0299 HHS/HOSPICE OF RN EA 15 MIN: HCPCS

## 2024-04-22 ASSESSMENT — ENCOUNTER SYMPTOMS
PAIN LOCATION - PAIN QUALITY: SORE
DYSPNEA ACTIVITY LEVEL: AFTER AMBULATING MORE THAN 20 FT

## 2024-04-23 ENCOUNTER — HOME CARE VISIT (OUTPATIENT)
Dept: SCHEDULING | Facility: HOME HEALTH | Age: 89
End: 2024-04-23
Payer: MEDICARE

## 2024-04-23 VITALS
SYSTOLIC BLOOD PRESSURE: 138 MMHG | OXYGEN SATURATION: 97 % | RESPIRATION RATE: 18 BRPM | TEMPERATURE: 98.2 F | HEART RATE: 62 BPM | DIASTOLIC BLOOD PRESSURE: 62 MMHG

## 2024-04-23 PROCEDURE — G0151 HHCP-SERV OF PT,EA 15 MIN: HCPCS

## 2024-04-23 PROCEDURE — G0155 HHCP-SVS OF CSW,EA 15 MIN: HCPCS

## 2024-04-23 ASSESSMENT — ENCOUNTER SYMPTOMS: PAIN LOCATION - PAIN QUALITY: NOT BAD

## 2024-04-24 ENCOUNTER — HOME CARE VISIT (OUTPATIENT)
Dept: SCHEDULING | Facility: HOME HEALTH | Age: 89
End: 2024-04-24
Payer: MEDICARE

## 2024-04-24 VITALS
OXYGEN SATURATION: 96 % | TEMPERATURE: 98.1 F | HEART RATE: 68 BPM | DIASTOLIC BLOOD PRESSURE: 70 MMHG | SYSTOLIC BLOOD PRESSURE: 110 MMHG | RESPIRATION RATE: 16 BRPM

## 2024-04-24 PROCEDURE — G0158 HHC OT ASSISTANT EA 15: HCPCS

## 2024-04-24 ASSESSMENT — ENCOUNTER SYMPTOMS: PAIN LOCATION - PAIN QUALITY: THROB

## 2024-04-25 ENCOUNTER — HOME CARE VISIT (OUTPATIENT)
Dept: SCHEDULING | Facility: HOME HEALTH | Age: 89
End: 2024-04-25
Payer: MEDICARE

## 2024-04-25 ENCOUNTER — CARE COORDINATION (OUTPATIENT)
Dept: CARE COORDINATION | Facility: CLINIC | Age: 89
End: 2024-04-25

## 2024-04-25 VITALS
TEMPERATURE: 98 F | HEART RATE: 58 BPM | OXYGEN SATURATION: 99 % | RESPIRATION RATE: 17 BRPM | DIASTOLIC BLOOD PRESSURE: 82 MMHG | SYSTOLIC BLOOD PRESSURE: 160 MMHG

## 2024-04-25 VITALS
TEMPERATURE: 97 F | HEART RATE: 66 BPM | SYSTOLIC BLOOD PRESSURE: 140 MMHG | DIASTOLIC BLOOD PRESSURE: 78 MMHG | RESPIRATION RATE: 18 BRPM

## 2024-04-25 VITALS
OXYGEN SATURATION: 97 % | RESPIRATION RATE: 18 BRPM | SYSTOLIC BLOOD PRESSURE: 160 MMHG | DIASTOLIC BLOOD PRESSURE: 82 MMHG | HEART RATE: 58 BPM | TEMPERATURE: 97.5 F

## 2024-04-25 PROCEDURE — G0157 HHC PT ASSISTANT EA 15: HCPCS

## 2024-04-25 PROCEDURE — G0156 HHCP-SVS OF AIDE,EA 15 MIN: HCPCS

## 2024-04-25 PROCEDURE — G0299 HHS/HOSPICE OF RN EA 15 MIN: HCPCS

## 2024-04-25 ASSESSMENT — ENCOUNTER SYMPTOMS
PAIN LOCATION - PAIN QUALITY: SORE, ACHY, SHARP
DYSPNEA ACTIVITY LEVEL: AFTER AMBULATING 10 - 20 FT
PAIN LOCATION - PAIN QUALITY: ACHING
STOOL DESCRIPTION: SOFT FORMED

## 2024-04-25 NOTE — CARE COORDINATION
Care Transitions Follow Up Call    Patient Current Location:  Home: [address] [place] [address] [address] [place] [address]    Care Transition Nurse contacted the family by telephone to follow up after admission on 3/28/2024.  Verified name and  with family as identifiers.    Patient: Lilliam Song  Patient : 1934   MRN: [id number]  Reason for Admission: hypercalcemia   Discharge Date: 24 RARS: Readmission Risk Score: 23.6      Needs to be reviewed by the provider   Additional needs identified to be addressed with provider: No  none             Method of communication with provider: none.    Addressed changes since last contact:  Daughter reports patient now doing better since they have her pain medication regime under control. PCP referred to Pain Management, but due to not being able to transport to office no appointment scheduled and [facility] ordered pain medication. Patient had visit with [facility] 2024. Remains engaged with [facility]. Daughter to contact CTN with any issues prior to next outreach.  Discussed follow-up appointments. If no appointment was previously scheduled, appointment scheduling offered: Yes.   Is follow up appointment scheduled within 7 days of discharge? Yes.    Follow Up  Future Appointments   Date Time Provider Department Center   2024 To Be Determined Cleopatra Pendleton [facility] HOME HEAL   2024 To Be Determined Charline Watts RN [facility] HOME HEAL   2024 To Be Determined Charla Burger PTA [facility] HOME HEAL   2024 To Be Determined Luisa Garduno [facility] HOME HEAL   2024 To Be Determined Teresa Purdy OTA [facility] HOME HEAL   2024 To Be Determined Charline Watts RN [facility] HEAL   2024 To Be Determined Charla Burger PTA [facility] HOME HEAL   5/3/2024 To Be Determined Cleopatra Pendleton [facility] HOME HEAL   2024 To Be Determined Charlien Watts RN [facility] HOME HEAL   2024 To Be Determined [place]

## 2024-04-27 ENCOUNTER — HOME CARE VISIT (OUTPATIENT)
Dept: SCHEDULING | Facility: HOME HEALTH | Age: 89
End: 2024-04-27
Payer: MEDICARE

## 2024-04-27 VITALS
HEART RATE: 68 BPM | TEMPERATURE: 97.9 F | SYSTOLIC BLOOD PRESSURE: 160 MMHG | RESPIRATION RATE: 17 BRPM | DIASTOLIC BLOOD PRESSURE: 74 MMHG

## 2024-04-27 PROCEDURE — G0156 HHCP-SVS OF AIDE,EA 15 MIN: HCPCS

## 2024-04-29 ENCOUNTER — HOME CARE VISIT (OUTPATIENT)
Dept: SCHEDULING | Facility: HOME HEALTH | Age: 89
End: 2024-04-29
Payer: MEDICARE

## 2024-04-29 VITALS
RESPIRATION RATE: 17 BRPM | HEART RATE: 63 BPM | SYSTOLIC BLOOD PRESSURE: 140 MMHG | DIASTOLIC BLOOD PRESSURE: 72 MMHG | TEMPERATURE: 97.9 F

## 2024-04-29 PROCEDURE — G0156 HHCP-SVS OF AIDE,EA 15 MIN: HCPCS

## 2024-04-30 ENCOUNTER — HOME CARE VISIT (OUTPATIENT)
Dept: SCHEDULING | Facility: HOME HEALTH | Age: 89
End: 2024-04-30
Payer: MEDICARE

## 2024-04-30 VITALS
HEART RATE: 60 BPM | TEMPERATURE: 98 F | RESPIRATION RATE: 16 BRPM | OXYGEN SATURATION: 92 % | DIASTOLIC BLOOD PRESSURE: 68 MMHG | SYSTOLIC BLOOD PRESSURE: 128 MMHG

## 2024-04-30 PROCEDURE — G0157 HHC PT ASSISTANT EA 15: HCPCS

## 2024-04-30 ASSESSMENT — ENCOUNTER SYMPTOMS: PAIN LOCATION - PAIN QUALITY: SORE, ACHY, SHARP

## 2024-05-01 ENCOUNTER — HOME CARE VISIT (OUTPATIENT)
Dept: SCHEDULING | Facility: HOME HEALTH | Age: 89
End: 2024-05-01
Payer: MEDICARE

## 2024-05-01 VITALS
SYSTOLIC BLOOD PRESSURE: 128 MMHG | DIASTOLIC BLOOD PRESSURE: 78 MMHG | RESPIRATION RATE: 17 BRPM | HEART RATE: 62 BPM | TEMPERATURE: 97.9 F

## 2024-05-01 VITALS
DIASTOLIC BLOOD PRESSURE: 68 MMHG | SYSTOLIC BLOOD PRESSURE: 110 MMHG | RESPIRATION RATE: 16 BRPM | HEART RATE: 68 BPM | OXYGEN SATURATION: 98 % | TEMPERATURE: 97.7 F

## 2024-05-01 PROCEDURE — G0156 HHCP-SVS OF AIDE,EA 15 MIN: HCPCS

## 2024-05-01 PROCEDURE — G0158 HHC OT ASSISTANT EA 15: HCPCS

## 2024-05-01 ASSESSMENT — ENCOUNTER SYMPTOMS: PAIN LOCATION - PAIN QUALITY: ACHE

## 2024-05-02 ENCOUNTER — CARE COORDINATION (OUTPATIENT)
Dept: CARE COORDINATION | Facility: CLINIC | Age: 89
End: 2024-05-02

## 2024-05-02 ENCOUNTER — HOME CARE VISIT (OUTPATIENT)
Dept: SCHEDULING | Facility: HOME HEALTH | Age: 89
End: 2024-05-02
Payer: MEDICARE

## 2024-05-02 VITALS
OXYGEN SATURATION: 97 % | SYSTOLIC BLOOD PRESSURE: 118 MMHG | HEART RATE: 60 BPM | TEMPERATURE: 98 F | DIASTOLIC BLOOD PRESSURE: 60 MMHG | RESPIRATION RATE: 17 BRPM

## 2024-05-02 PROCEDURE — G0157 HHC PT ASSISTANT EA 15: HCPCS

## 2024-05-02 ASSESSMENT — ENCOUNTER SYMPTOMS: PAIN LOCATION - PAIN QUALITY: SORE, ACHY, SHARP

## 2024-05-02 NOTE — CARE COORDINATION
and Final Calls  Have your medications changed?: No  Do you have any questions related to your medications?: No  Do you currently have any active services?: Yes  Are you currently active with any services?: Home Health, Other  Do you have any needs or concerns that I can assist you with?: No  Identified Barriers: None  Care Transitions Interventions    Physical Therapy: Completed       DME Assistance: Completed    Occupational Therapy: Completed      Palliative Care: Completed     Other Interventions:             LPN Care Coordinator provided contact information for future needs. Plan for follow-up call in 7-10 days based on severity of symptoms and risk factors.  Plan for next call: symptom management    Alyssa Newton LPN

## 2024-05-03 ENCOUNTER — HOME CARE VISIT (OUTPATIENT)
Dept: SCHEDULING | Facility: HOME HEALTH | Age: 89
End: 2024-05-03
Payer: MEDICARE

## 2024-05-03 VITALS
OXYGEN SATURATION: 95 % | HEART RATE: 60 BPM | SYSTOLIC BLOOD PRESSURE: 138 MMHG | RESPIRATION RATE: 16 BRPM | TEMPERATURE: 97.7 F | DIASTOLIC BLOOD PRESSURE: 80 MMHG

## 2024-05-03 PROCEDURE — G0299 HHS/HOSPICE OF RN EA 15 MIN: HCPCS

## 2024-05-03 ASSESSMENT — ENCOUNTER SYMPTOMS
PAIN LOCATION - PAIN QUALITY: SHARP
CHEST TIGHTNESS: 1
STOOL DESCRIPTION: LIQUID

## 2024-05-05 ENCOUNTER — HOME CARE VISIT (OUTPATIENT)
Dept: SCHEDULING | Facility: HOME HEALTH | Age: 89
End: 2024-05-05
Payer: MEDICARE

## 2024-05-05 VITALS
TEMPERATURE: 97.2 F | HEART RATE: 59 BPM | SYSTOLIC BLOOD PRESSURE: 134 MMHG | RESPIRATION RATE: 18 BRPM | OXYGEN SATURATION: 97 % | DIASTOLIC BLOOD PRESSURE: 86 MMHG

## 2024-05-05 PROCEDURE — G0299 HHS/HOSPICE OF RN EA 15 MIN: HCPCS

## 2024-05-07 ENCOUNTER — HOME CARE VISIT (OUTPATIENT)
Dept: SCHEDULING | Facility: HOME HEALTH | Age: 89
End: 2024-05-07
Payer: MEDICARE

## 2024-05-07 VITALS
DIASTOLIC BLOOD PRESSURE: 80 MMHG | HEART RATE: 62 BPM | RESPIRATION RATE: 16 BRPM | SYSTOLIC BLOOD PRESSURE: 140 MMHG | TEMPERATURE: 97.1 F

## 2024-05-07 PROCEDURE — G0156 HHCP-SVS OF AIDE,EA 15 MIN: HCPCS

## 2024-05-08 ENCOUNTER — HOME CARE VISIT (OUTPATIENT)
Dept: SCHEDULING | Facility: HOME HEALTH | Age: 89
End: 2024-05-08
Payer: MEDICARE

## 2024-05-08 ENCOUNTER — CARE COORDINATION (OUTPATIENT)
Dept: CARE COORDINATION | Facility: CLINIC | Age: 89
End: 2024-05-08

## 2024-05-08 VITALS
RESPIRATION RATE: 17 BRPM | DIASTOLIC BLOOD PRESSURE: 70 MMHG | OXYGEN SATURATION: 96 % | HEART RATE: 70 BPM | SYSTOLIC BLOOD PRESSURE: 112 MMHG | TEMPERATURE: 98.1 F

## 2024-05-08 PROCEDURE — G0157 HHC PT ASSISTANT EA 15: HCPCS

## 2024-05-08 ASSESSMENT — ENCOUNTER SYMPTOMS: PAIN LOCATION - PAIN QUALITY: ACHING

## 2024-05-08 NOTE — CARE COORDINATION
Care Transitions Outreach Attempt    Call within 2 business days of discharge: Yes   Attempted to reach patient for transitions of care follow up. Unable to reach patient.  Left voice message providing this LPN CC contact information.  Program closed no readmission x 30d.  Will remain available for return call. Patient remains engaged with [facility] PT, RN and PTA.  Follow up appointments reviewed and are scheduled appropriately.  Patient's daughter, Kosta is primary caregiver.  Patient has strong family support.    Patient: Lilliam Song Patient : 1934 MRN: [id number]    Last Discharge Facility       Date Complaint Diagnosis Description Type Department Provider    3/28/24 OTHER Hypercalcemia ... ED to Hosp-Admission (Discharged) (ADMITTED) [id number] Liliam Manrique, DO; Lyle Hua, DO;...              Was this an external facility discharge? No Discharge Facility Name: [facility]    Noted following upcoming appointments from discharge chart review:   [facility] follow up appointment(s):   Future Appointments   Date Time Provider Department Center   2024  1:00 PM Rodrick Herrera PTA [facility] HOME HEAL   2024 To Be Determined Cleopatra Pendleton [facility] HOME HEAL   2024 11:30 AM Charline Watts RN [facility] HOME HEAL   5/10/2024 11:00 AM Charla Burger PTA [facility] HOME HEAL   2024 To Be Determined Charline Watts RN [facility] HOME HEAL   2024 To Be Determined Varghese Alegre Cleopatra [facility] HOME HEAL   2024 To Be Determined Charla Burger PTA [facility] HOME HEAL   5/15/2024 11:15 AM Jhony De MD PO[facility]   2024 To Be Determined Charline Watts RN [facility] HOME HEAL   2024 To Be Determined Michelle Lagos PT [facility] HOME HEAL   2024 To Be Determined Cleopatra Pendleton [facility] HOME HEAL   2024 To Be Determined Charline Watts RN [facility] HOME HEAL   2024  9:45 AM [facility]   2024 To Be Determined Cleopatra Pendleton [facility] [place]

## 2024-05-09 ENCOUNTER — HOME CARE VISIT (OUTPATIENT)
Dept: SCHEDULING | Facility: HOME HEALTH | Age: 89
End: 2024-05-09
Payer: MEDICARE

## 2024-05-09 VITALS
SYSTOLIC BLOOD PRESSURE: 140 MMHG | DIASTOLIC BLOOD PRESSURE: 64 MMHG | TEMPERATURE: 97.1 F | HEART RATE: 66 BPM | OXYGEN SATURATION: 96 % | RESPIRATION RATE: 18 BRPM

## 2024-05-09 VITALS
SYSTOLIC BLOOD PRESSURE: 138 MMHG | RESPIRATION RATE: 17 BRPM | TEMPERATURE: 97.9 F | DIASTOLIC BLOOD PRESSURE: 76 MMHG | HEART RATE: 77 BPM

## 2024-05-09 PROCEDURE — G0299 HHS/HOSPICE OF RN EA 15 MIN: HCPCS

## 2024-05-09 PROCEDURE — G0156 HHCP-SVS OF AIDE,EA 15 MIN: HCPCS

## 2024-05-09 ASSESSMENT — ENCOUNTER SYMPTOMS
PAIN LOCATION - PAIN QUALITY: ACHING
CONSTIPATION: 1
DYSPNEA ACTIVITY LEVEL: AFTER AMBULATING 10 - 20 FT

## 2024-05-10 ENCOUNTER — HOME CARE VISIT (OUTPATIENT)
Dept: HOME HEALTH SERVICES | Facility: HOME HEALTH | Age: 89
End: 2024-05-10
Payer: MEDICARE

## 2024-05-10 ENCOUNTER — HOME CARE VISIT (OUTPATIENT)
Dept: SCHEDULING | Facility: HOME HEALTH | Age: 89
End: 2024-05-10
Payer: MEDICARE

## 2024-05-10 VITALS
HEART RATE: 72 BPM | RESPIRATION RATE: 17 BRPM | SYSTOLIC BLOOD PRESSURE: 140 MMHG | DIASTOLIC BLOOD PRESSURE: 88 MMHG | OXYGEN SATURATION: 98 % | TEMPERATURE: 98 F

## 2024-05-10 PROCEDURE — G0157 HHC PT ASSISTANT EA 15: HCPCS

## 2024-05-10 ASSESSMENT — ENCOUNTER SYMPTOMS: PAIN LOCATION - PAIN QUALITY: SORE, SHARP

## 2024-05-13 ENCOUNTER — HOME CARE VISIT (OUTPATIENT)
Dept: SCHEDULING | Facility: HOME HEALTH | Age: 89
End: 2024-05-13
Payer: MEDICARE

## 2024-05-13 VITALS
HEART RATE: 72 BPM | DIASTOLIC BLOOD PRESSURE: 76 MMHG | OXYGEN SATURATION: 96 % | SYSTOLIC BLOOD PRESSURE: 162 MMHG | TEMPERATURE: 97.8 F | RESPIRATION RATE: 18 BRPM

## 2024-05-13 PROCEDURE — G0299 HHS/HOSPICE OF RN EA 15 MIN: HCPCS

## 2024-05-13 ASSESSMENT — ENCOUNTER SYMPTOMS
CONSTIPATION: 1
PAIN LOCATION - PAIN QUALITY: SORE
BOWEL INCONTINENCE: 1
DYSPNEA ACTIVITY LEVEL: AFTER AMBULATING 10 - 20 FT

## 2024-05-14 ENCOUNTER — APPOINTMENT (OUTPATIENT)
Dept: MRI IMAGING | Age: 89
End: 2024-05-14
Payer: MEDICARE

## 2024-05-14 ENCOUNTER — HOME CARE VISIT (OUTPATIENT)
Dept: SCHEDULING | Facility: HOME HEALTH | Age: 89
End: 2024-05-14
Payer: MEDICARE

## 2024-05-14 ENCOUNTER — APPOINTMENT (OUTPATIENT)
Dept: CT IMAGING | Age: 89
End: 2024-05-14
Payer: MEDICARE

## 2024-05-14 ENCOUNTER — HOSPITAL ENCOUNTER (INPATIENT)
Age: 89
LOS: 2 days | Discharge: HOME | End: 2024-05-16
Attending: EMERGENCY MEDICINE | Admitting: STUDENT IN AN ORGANIZED HEALTH CARE EDUCATION/TRAINING PROGRAM
Payer: MEDICARE

## 2024-05-14 ENCOUNTER — TELEPHONE (OUTPATIENT)
Dept: INTERNAL MEDICINE CLINIC | Facility: CLINIC | Age: 89
End: 2024-05-14

## 2024-05-14 ENCOUNTER — APPOINTMENT (OUTPATIENT)
Dept: GENERAL RADIOLOGY | Age: 89
End: 2024-05-14
Payer: MEDICARE

## 2024-05-14 VITALS
DIASTOLIC BLOOD PRESSURE: 90 MMHG | HEART RATE: 66 BPM | SYSTOLIC BLOOD PRESSURE: 200 MMHG | TEMPERATURE: 97.1 F | RESPIRATION RATE: 18 BRPM

## 2024-05-14 VITALS
RESPIRATION RATE: 17 BRPM | OXYGEN SATURATION: 98 % | DIASTOLIC BLOOD PRESSURE: 90 MMHG | SYSTOLIC BLOOD PRESSURE: 188 MMHG | TEMPERATURE: 64 F | HEART RATE: 64 BPM

## 2024-05-14 DIAGNOSIS — R90.89 ABNORMAL BRAIN CT: ICD-10-CM

## 2024-05-14 DIAGNOSIS — G45.9 TRANSIENT ISCHEMIC ATTACK: Primary | ICD-10-CM

## 2024-05-14 DIAGNOSIS — N30.00 ACUTE CYSTITIS WITHOUT HEMATURIA: ICD-10-CM

## 2024-05-14 LAB
ALBUMIN SERPL-MCNC: 3.2 G/DL (ref 3.2–4.6)
ALBUMIN/GLOB SERPL: 1.1 (ref 1–1.9)
ALP SERPL-CCNC: 89 U/L (ref 35–104)
ALT SERPL-CCNC: 8 U/L (ref 12–65)
ANION GAP SERPL CALC-SCNC: 12 MMOL/L (ref 9–18)
APPEARANCE UR: ABNORMAL
AST SERPL-CCNC: 24 U/L (ref 15–37)
BACTERIA URNS QL MICRO: ABNORMAL /HPF
BILIRUB SERPL-MCNC: 0.8 MG/DL (ref 0–1.2)
BILIRUB UR QL: NEGATIVE
BUN SERPL-MCNC: 6 MG/DL (ref 8–23)
CALCIUM SERPL-MCNC: 10.2 MG/DL (ref 8.8–10.2)
CHLORIDE SERPL-SCNC: 104 MMOL/L (ref 98–107)
CHOLEST SERPL-MCNC: 134 MG/DL (ref 0–200)
CO2 SERPL-SCNC: 26 MMOL/L (ref 20–28)
COLOR UR: ABNORMAL
CREAT SERPL-MCNC: 0.49 MG/DL (ref 0.6–1.1)
EPI CELLS #/AREA URNS HPF: ABNORMAL /HPF
ERYTHROCYTE [DISTWIDTH] IN BLOOD BY AUTOMATED COUNT: 14.8 % (ref 11.9–14.6)
EST. AVERAGE GLUCOSE BLD GHB EST-MCNC: 84 MG/DL
GLOBULIN SER CALC-MCNC: 3 G/DL (ref 2.3–3.5)
GLUCOSE SERPL-MCNC: 91 MG/DL (ref 70–99)
GLUCOSE UR STRIP.AUTO-MCNC: NEGATIVE MG/DL
HBA1C MFR BLD: 4.5 % (ref 0–5.6)
HCT VFR BLD AUTO: 38.3 % (ref 35.8–46.3)
HDLC SERPL-MCNC: 52 MG/DL (ref 40–60)
HDLC SERPL: 2.6 (ref 0–5)
HGB BLD-MCNC: 12.1 G/DL (ref 11.7–15.4)
HGB UR QL STRIP: ABNORMAL
KETONES UR QL STRIP.AUTO: NEGATIVE MG/DL
LDLC SERPL CALC-MCNC: 66 MG/DL (ref 0–100)
LEUKOCYTE ESTERASE UR QL STRIP.AUTO: ABNORMAL
MCH RBC QN AUTO: 30 PG (ref 26.1–32.9)
MCHC RBC AUTO-ENTMCNC: 31.6 G/DL (ref 31.4–35)
MCV RBC AUTO: 94.8 FL (ref 82–102)
NITRITE UR QL STRIP.AUTO: NEGATIVE
NRBC # BLD: 0 K/UL (ref 0–0.2)
OTHER OBSERVATIONS: ABNORMAL
PH UR STRIP: 5.5 (ref 5–9)
PLATELET # BLD AUTO: 247 K/UL (ref 150–450)
PMV BLD AUTO: 11.2 FL (ref 9.4–12.3)
POTASSIUM SERPL-SCNC: 4.1 MMOL/L (ref 3.5–5.1)
PROT SERPL-MCNC: 6.1 G/DL (ref 6.3–8.2)
PROT UR STRIP-MCNC: ABNORMAL MG/DL
RBC # BLD AUTO: 4.04 M/UL (ref 4.05–5.2)
RBC #/AREA URNS HPF: ABNORMAL /HPF
SODIUM SERPL-SCNC: 143 MMOL/L (ref 136–145)
SP GR UR REFRACTOMETRY: 1.01 (ref 1–1.02)
T4 FREE SERPL-MCNC: 1.8 NG/DL (ref 0.9–1.7)
TRIGL SERPL-MCNC: 82 MG/DL (ref 0–150)
TSH W FREE THYROID IF ABNORMAL: 0.22 UIU/ML (ref 0.27–4.2)
UROBILINOGEN UR QL STRIP.AUTO: 1 EU/DL (ref 0.2–1)
VLDLC SERPL CALC-MCNC: 16 MG/DL (ref 6–23)
WBC # BLD AUTO: 5 K/UL (ref 4.3–11.1)
WBC URNS QL MICRO: >100 /HPF

## 2024-05-14 PROCEDURE — 80053 COMPREHEN METABOLIC PANEL: CPT

## 2024-05-14 PROCEDURE — 2580000003 HC RX 258: Performed by: EMERGENCY MEDICINE

## 2024-05-14 PROCEDURE — G0157 HHC PT ASSISTANT EA 15: HCPCS

## 2024-05-14 PROCEDURE — 36415 COLL VENOUS BLD VENIPUNCTURE: CPT

## 2024-05-14 PROCEDURE — 85027 COMPLETE CBC AUTOMATED: CPT

## 2024-05-14 PROCEDURE — 81001 URINALYSIS AUTO W/SCOPE: CPT

## 2024-05-14 PROCEDURE — 80061 LIPID PANEL: CPT

## 2024-05-14 PROCEDURE — 2580000003 HC RX 258: Performed by: INTERNAL MEDICINE

## 2024-05-14 PROCEDURE — 99285 EMERGENCY DEPT VISIT HI MDM: CPT

## 2024-05-14 PROCEDURE — 70450 CT HEAD/BRAIN W/O DYE: CPT

## 2024-05-14 PROCEDURE — 74018 RADEX ABDOMEN 1 VIEW: CPT

## 2024-05-14 PROCEDURE — 2580000003 HC RX 258: Performed by: STUDENT IN AN ORGANIZED HEALTH CARE EDUCATION/TRAINING PROGRAM

## 2024-05-14 PROCEDURE — A4216 STERILE WATER/SALINE, 10 ML: HCPCS | Performed by: INTERNAL MEDICINE

## 2024-05-14 PROCEDURE — 6360000002 HC RX W HCPCS: Performed by: EMERGENCY MEDICINE

## 2024-05-14 PROCEDURE — 87040 BLOOD CULTURE FOR BACTERIA: CPT

## 2024-05-14 PROCEDURE — 6370000000 HC RX 637 (ALT 250 FOR IP): Performed by: STUDENT IN AN ORGANIZED HEALTH CARE EDUCATION/TRAINING PROGRAM

## 2024-05-14 PROCEDURE — 6360000002 HC RX W HCPCS: Performed by: INTERNAL MEDICINE

## 2024-05-14 PROCEDURE — G0156 HHCP-SVS OF AIDE,EA 15 MIN: HCPCS

## 2024-05-14 PROCEDURE — 1100000003 HC PRIVATE W/ TELEMETRY

## 2024-05-14 PROCEDURE — 70551 MRI BRAIN STEM W/O DYE: CPT

## 2024-05-14 PROCEDURE — 84443 ASSAY THYROID STIM HORMONE: CPT

## 2024-05-14 PROCEDURE — 87086 URINE CULTURE/COLONY COUNT: CPT

## 2024-05-14 PROCEDURE — 87088 URINE BACTERIA CULTURE: CPT

## 2024-05-14 PROCEDURE — 83036 HEMOGLOBIN GLYCOSYLATED A1C: CPT

## 2024-05-14 PROCEDURE — 87186 SC STD MICRODIL/AGAR DIL: CPT

## 2024-05-14 PROCEDURE — 6370000000 HC RX 637 (ALT 250 FOR IP): Performed by: INTERNAL MEDICINE

## 2024-05-14 PROCEDURE — 84439 ASSAY OF FREE THYROXINE: CPT

## 2024-05-14 RX ORDER — CINACALCET 30 MG/1
30 TABLET, FILM COATED ORAL DAILY
Status: DISCONTINUED | OUTPATIENT
Start: 2024-05-15 | End: 2024-05-16 | Stop reason: HOSPADM

## 2024-05-14 RX ORDER — PREGABALIN 25 MG/1
25 CAPSULE ORAL 2 TIMES DAILY
Status: DISCONTINUED | OUTPATIENT
Start: 2024-05-14 | End: 2024-05-16 | Stop reason: HOSPADM

## 2024-05-14 RX ORDER — POTASSIUM CHLORIDE 20 MEQ/1
40 TABLET, EXTENDED RELEASE ORAL PRN
Status: DISCONTINUED | OUTPATIENT
Start: 2024-05-14 | End: 2024-05-16 | Stop reason: HOSPADM

## 2024-05-14 RX ORDER — OLANZAPINE 5 MG/1
10 TABLET, ORALLY DISINTEGRATING ORAL 2 TIMES DAILY PRN
Status: DISCONTINUED | OUTPATIENT
Start: 2024-05-14 | End: 2024-05-16 | Stop reason: HOSPADM

## 2024-05-14 RX ORDER — LATANOPROST 50 UG/ML
1 SOLUTION/ DROPS OPHTHALMIC NIGHTLY
Status: DISCONTINUED | OUTPATIENT
Start: 2024-05-14 | End: 2024-05-16 | Stop reason: HOSPADM

## 2024-05-14 RX ORDER — QUETIAPINE FUMARATE 25 MG/1
50 TABLET, FILM COATED ORAL 2 TIMES DAILY PRN
Status: DISCONTINUED | OUTPATIENT
Start: 2024-05-14 | End: 2024-05-15

## 2024-05-14 RX ORDER — SODIUM CHLORIDE 0.9 % (FLUSH) 0.9 %
5-40 SYRINGE (ML) INJECTION EVERY 12 HOURS SCHEDULED
Status: DISCONTINUED | OUTPATIENT
Start: 2024-05-14 | End: 2024-05-16 | Stop reason: HOSPADM

## 2024-05-14 RX ORDER — MAGNESIUM SULFATE IN WATER 40 MG/ML
2000 INJECTION, SOLUTION INTRAVENOUS PRN
Status: DISCONTINUED | OUTPATIENT
Start: 2024-05-14 | End: 2024-05-16 | Stop reason: HOSPADM

## 2024-05-14 RX ORDER — SODIUM CHLORIDE 0.9 % (FLUSH) 0.9 %
5-40 SYRINGE (ML) INJECTION PRN
Status: DISCONTINUED | OUTPATIENT
Start: 2024-05-14 | End: 2024-05-16 | Stop reason: HOSPADM

## 2024-05-14 RX ORDER — DORZOLAMIDE HYDROCHLORIDE AND TIMOLOL MALEATE 20; 5 MG/ML; MG/ML
1 SOLUTION/ DROPS OPHTHALMIC 2 TIMES DAILY
Status: DISCONTINUED | OUTPATIENT
Start: 2024-05-14 | End: 2024-05-16 | Stop reason: HOSPADM

## 2024-05-14 RX ORDER — SENNA AND DOCUSATE SODIUM 50; 8.6 MG/1; MG/1
2 TABLET, FILM COATED ORAL 2 TIMES DAILY
Status: DISCONTINUED | OUTPATIENT
Start: 2024-05-14 | End: 2024-05-16 | Stop reason: HOSPADM

## 2024-05-14 RX ORDER — METHIMAZOLE 5 MG/1
5 TABLET ORAL
Status: DISCONTINUED | OUTPATIENT
Start: 2024-05-15 | End: 2024-05-16 | Stop reason: HOSPADM

## 2024-05-14 RX ORDER — SODIUM CHLORIDE 9 MG/ML
INJECTION, SOLUTION INTRAVENOUS PRN
Status: DISCONTINUED | OUTPATIENT
Start: 2024-05-14 | End: 2024-05-16 | Stop reason: HOSPADM

## 2024-05-14 RX ORDER — PRAVASTATIN SODIUM 20 MG
10 TABLET ORAL DAILY
Status: DISCONTINUED | OUTPATIENT
Start: 2024-05-14 | End: 2024-05-16 | Stop reason: HOSPADM

## 2024-05-14 RX ORDER — ACETAMINOPHEN 325 MG/1
650 TABLET ORAL EVERY 6 HOURS PRN
Status: DISCONTINUED | OUTPATIENT
Start: 2024-05-14 | End: 2024-05-16 | Stop reason: HOSPADM

## 2024-05-14 RX ORDER — ONDANSETRON 4 MG/1
4 TABLET, ORALLY DISINTEGRATING ORAL EVERY 8 HOURS PRN
Status: DISCONTINUED | OUTPATIENT
Start: 2024-05-14 | End: 2024-05-16 | Stop reason: HOSPADM

## 2024-05-14 RX ORDER — ONDANSETRON 2 MG/ML
4 INJECTION INTRAMUSCULAR; INTRAVENOUS EVERY 6 HOURS PRN
Status: DISCONTINUED | OUTPATIENT
Start: 2024-05-14 | End: 2024-05-16 | Stop reason: HOSPADM

## 2024-05-14 RX ORDER — HYDROCODONE BITARTRATE AND ACETAMINOPHEN 5; 325 MG/1; MG/1
1 TABLET ORAL EVERY 6 HOURS PRN
Status: DISCONTINUED | OUTPATIENT
Start: 2024-05-14 | End: 2024-05-16 | Stop reason: HOSPADM

## 2024-05-14 RX ORDER — POTASSIUM CHLORIDE 7.45 MG/ML
10 INJECTION INTRAVENOUS PRN
Status: DISCONTINUED | OUTPATIENT
Start: 2024-05-14 | End: 2024-05-16 | Stop reason: HOSPADM

## 2024-05-14 RX ORDER — POLYETHYLENE GLYCOL 3350 17 G/17G
17 POWDER, FOR SOLUTION ORAL DAILY PRN
Status: DISCONTINUED | OUTPATIENT
Start: 2024-05-14 | End: 2024-05-16 | Stop reason: HOSPADM

## 2024-05-14 RX ORDER — MONTELUKAST SODIUM 10 MG/1
10 TABLET ORAL NIGHTLY
Status: DISCONTINUED | OUTPATIENT
Start: 2024-05-14 | End: 2024-05-16 | Stop reason: HOSPADM

## 2024-05-14 RX ORDER — ACETAMINOPHEN 650 MG/1
650 SUPPOSITORY RECTAL EVERY 6 HOURS PRN
Status: DISCONTINUED | OUTPATIENT
Start: 2024-05-14 | End: 2024-05-16 | Stop reason: HOSPADM

## 2024-05-14 RX ORDER — ASCORBIC ACID 500 MG
500 TABLET ORAL DAILY
Status: DISCONTINUED | OUTPATIENT
Start: 2024-05-14 | End: 2024-05-16 | Stop reason: HOSPADM

## 2024-05-14 RX ADMIN — APIXABAN 5 MG: 5 TABLET, FILM COATED ORAL at 20:48

## 2024-05-14 RX ADMIN — PRAVASTATIN SODIUM 10 MG: 20 TABLET ORAL at 17:25

## 2024-05-14 RX ADMIN — DORZOLAMIDE HYDROCHLORIDE AND TIMOLOL MALEATE 1 DROP: 20; 5 SOLUTION/ DROPS OPHTHALMIC at 21:00

## 2024-05-14 RX ADMIN — SODIUM CHLORIDE 1 MG: 9 INJECTION INTRAMUSCULAR; INTRAVENOUS; SUBCUTANEOUS at 18:54

## 2024-05-14 RX ADMIN — QUETIAPINE FUMARATE 50 MG: 25 TABLET ORAL at 23:46

## 2024-05-14 RX ADMIN — DOCUSATE SODIUM 50 MG AND SENNOSIDES 8.6 MG 2 TABLET: 8.6; 5 TABLET, FILM COATED ORAL at 20:48

## 2024-05-14 RX ADMIN — SODIUM CHLORIDE, PRESERVATIVE FREE 10 ML: 5 INJECTION INTRAVENOUS at 20:48

## 2024-05-14 RX ADMIN — PREGABALIN 25 MG: 25 CAPSULE ORAL at 20:48

## 2024-05-14 RX ADMIN — MONTELUKAST 10 MG: 10 TABLET, FILM COATED ORAL at 20:48

## 2024-05-14 RX ADMIN — LATANOPROST 1 DROP: 50 SOLUTION OPHTHALMIC at 21:00

## 2024-05-14 RX ADMIN — CEFTRIAXONE 1000 MG: 1 INJECTION, POWDER, FOR SOLUTION INTRAMUSCULAR; INTRAVENOUS at 15:08

## 2024-05-14 ASSESSMENT — PAIN - FUNCTIONAL ASSESSMENT: PAIN_FUNCTIONAL_ASSESSMENT: 0-10

## 2024-05-14 ASSESSMENT — ENCOUNTER SYMPTOMS
DIARRHEA: 0
ABDOMINAL PAIN: 0
RHINORRHEA: 0
COUGH: 0
NAUSEA: 0
COLOR CHANGE: 0
BACK PAIN: 0
PAIN LOCATION - PAIN QUALITY: SORE, ACHY, SHARP
VOMITING: 0

## 2024-05-14 ASSESSMENT — LIFESTYLE VARIABLES
HOW OFTEN DO YOU HAVE A DRINK CONTAINING ALCOHOL: NEVER
HOW MANY STANDARD DRINKS CONTAINING ALCOHOL DO YOU HAVE ON A TYPICAL DAY: PATIENT DOES NOT DRINK

## 2024-05-14 ASSESSMENT — PAIN SCALES - GENERAL
PAINLEVEL_OUTOF10: 0
PAINLEVEL_OUTOF10: 0

## 2024-05-14 NOTE — H&P
Hospitalist History and Physical   Admit Date:  2024 11:56 AM   Name:  Lilliam Song   Age:  89 y.o.  Sex:  female  :  1934   MRN:  [id number]   Room:  [place] [address]    Presenting/Chief Complaint: Altered Mental Status     Reason(s) for Admission: TIA (transient ischemic attack) [G45.9]     History of Present Illness:   Lilliam Song is a 89 y.o. female with medical history of pulmonary embolism, hypertension, hyperparathyroidism, osteoporosis, peptic ulcer disease, TIA stroke, vitamin D deficiency, cerebrovascular disease who presented with facial droop and speech difficulty.  Patient's symptoms resolved upon arrival.  Initial NIH of 4 secondary to chronic lower extremity weakness.  No new focal deficits observed.  Patient currently at baseline.  Evidence of urinary tract infection on UA.  CT brain with some artifact in the cerebellum.  MRI brain pending at this time.  Patient will get worked up for TIA.  Started on IV Rocephin for urinary tract infection.  Urine and blood cultures pending.  Elevated blood pressure of 170/85.      Assessment & Plan:   TIA (transient ischemic attack)  -CT brain with artifact and cerebellum  - MRI brain pending  - Neurology consulted  - Lipid panel  - A1c  - TSH  - Telemetry  - Allow permissive hypertension for next 24 hours  - If blood pressure systolic greater than 220 can give IV labetalol  -PT/OT    Urinary tract infection  - Positive UA  - Urine culture pending  - IV Rocephin    History of pulmonary embolism  - Continue home Eliquis    Hypertension  - Antihypertensives on hold to allow permissive hypertension    Hypothyroidism  - Continue home methimazole      PT/OT evals and PPD ordered?  Therapy   Diet: No diet orders on file  VTE prophylaxis: Already on anticoagulation  Code status: Prior      Non-peripheral Lines and Tubes (if present):             Hospital Problems:  Principal Problem:    TIA (transient ischemic attack)  Resolved Problems:    * No resolved

## 2024-05-14 NOTE — ED TRIAGE NOTES
Pt 89 y/o female arrives via [facility] ems from home with a complaint from her family that patient was talking through her teeth and had altered mental status upon waking up this morning at 9am.

## 2024-05-14 NOTE — ED PROVIDER NOTES
Emergency Department Provider Note       PCP: Yash Heredia DO   Age: 89 y.o.   Sex: female     DISPOSITION Decision To Admit 05/14/2024 02:10:01 PM       ICD-10-CM    1. Transient ischemic attack  G45.9       2. Abnormal brain CT  R90.89       3. Acute cystitis without hematuria  N30.00           Medical Decision Making     Patient presented with concern about home health nurse concerned about speech difficulty and facial droop.  The symptoms were resolved upon arrival.  NIH was 4 but this was due to chronic lower extremity weakness and no new focal deficits.  Patient at baseline.  Patient found to have a UTI.  CT scan of the brain shows some artifact in the cerebellum and MRI recommended.  Will admit for TIA workup MRI and treatment of UTI.  Urine culture and blood cultures will be obtained prior to IV Rocephin for UTI.  No signs of sepsis severe sepsis or septic shock at time of admission     1 or more acute illnesses that pose a threat to life or bodily function.   Shared medical decision making was utilized in creating the patients health plan today.  Considerations: The following items were considered but not ordered: MRI of the brain but this will be ordered as an inpatient.    I independently ordered and reviewed each unique test.  I reviewed external records: Home health record    The patients assessment required an independent historian: EMS and the patient's daughter.  The reason they were needed is important historical information not provided by the patient.  No intracranial hemorrhage, per radiology some possible artifact versus hypoattenuation in the cerebellum , MRI recommend  My Independent EKG Interpretation: sinus rhythm, no evidence of arrhythmia and left ventricular hypertrophy      ST Segments:Nonspecific ST segments - NO STEMI   Rate: 62  The patient was admitted and I have discussed patient management with the admitting provider.  Case discussed with neurology who will see patient during

## 2024-05-14 NOTE — ED NOTES
TRANSFER - OUT REPORT:    Verbal report given to BEVERLEY Duncan on Lilliam Song  being transferred to [facility] for routine progression of patient care       Report consisted of patient's Situation, Background, Assessment and   Recommendations(SBAR).     Information from the following report(s) ED Encounter Summary and ED SBAR was reviewed with the receiving nurse.    [place] Fall Assessment:    Presents to emergency department  because of falls (Syncope, seizure, or loss of consciousness): No  Age > 70: Yes  Altered Mental Status, Intoxication with alcohol or substance confusion (Disorientation, impaired judgment, poor safety awaremess, or inability to follow instructions): No  Impaired Mobility: Ambulates or transfers with assistive devices or assistance; Unable to ambulate or transer.: Yes             Lines:   Peripheral IV 05/14/24 Proximal;Right Forearm (Active)        Opportunity for questions and clarification was provided.      Patient transported with:  Marquita Tolliver RN  05/14/24 1498

## 2024-05-14 NOTE — PROGRESS NOTES
4 Eyes Skin Assessment     NAME:  Lilliam Song  YOB: 1934  MEDICAL RECORD NUMBER:  [id number]    The patient is being assessed for  Admission    I agree that at least one RN has performed a thorough Head to Toe Skin Assessment on the patient. ALL assessment sites listed below have been assessed.      Areas assessed by both nurses:    Head, Face, Ears, Shoulders, Back, Chest, Arms, Elbows, Hands, Sacrum. Buttock, Coccyx, Ischium, and Legs. Feet and Heels        Does the Patient have a Wound? Yes wound(s) were present on assessment. LDA wound assessment was Initiated and completed by RN       [place] Prevention initiated by RN: Yes  Wound Care Orders initiated by RN: Yes    Pressure Injury (Stage 3,4, Unstageable, DTI, NWPT, and Complex wounds) if present, place Wound referral order by RN under : Yes    New Ostomies, if present place, Ostomy referral order under : No     Nurse 1 eSignature: Electronically signed by Perla Cooper RN on 5/14/24 at 6:23 PM EDT    **SHARE this note so that the co-signing nurse can place an eSignature**    Nurse 2 eSignature: {Esignature:[id number]}'

## 2024-05-14 NOTE — TELEPHONE ENCOUNTER
Home Health Nurse called regarding pt vitals.    BP elevated after bath 188/90. HR 64. BP med taken at 10 am. pt drinking water now. Reported no headache or chest pain.     After a couple minutes, states pt's speech was getting more closed and that her left side was drooping. Nurse states she is calling 911, so she ended the phone call in order to make the emergency call.

## 2024-05-15 ENCOUNTER — APPOINTMENT (OUTPATIENT)
Dept: GENERAL RADIOLOGY | Age: 89
End: 2024-05-15
Payer: MEDICARE

## 2024-05-15 LAB
ALBUMIN SERPL-MCNC: 3.1 G/DL (ref 3.2–4.6)
ALBUMIN SERPL-MCNC: 3.2 G/DL (ref 3.2–4.6)
ALBUMIN/GLOB SERPL: 0.8 (ref 1–1.9)
ALBUMIN/GLOB SERPL: 1 (ref 1–1.9)
ALP SERPL-CCNC: 90 U/L (ref 35–104)
ALP SERPL-CCNC: 91 U/L (ref 35–104)
ALT SERPL-CCNC: 6 U/L (ref 12–65)
ALT SERPL-CCNC: 8 U/L (ref 12–65)
ANION GAP SERPL CALC-SCNC: 14 MMOL/L (ref 9–18)
ANION GAP SERPL CALC-SCNC: 17 MMOL/L (ref 9–18)
ARTERIAL PATENCY WRIST A: POSITIVE
AST SERPL-CCNC: 32 U/L (ref 15–37)
AST SERPL-CCNC: 40 U/L (ref 15–37)
BASE DEFICIT BLD-SCNC: 1.1 MMOL/L
BASOPHILS # BLD: 0 K/UL (ref 0–0.2)
BASOPHILS NFR BLD: 1 % (ref 0–2)
BDY SITE: ABNORMAL
BILIRUB SERPL-MCNC: 0.6 MG/DL (ref 0–1.2)
BILIRUB SERPL-MCNC: 0.8 MG/DL (ref 0–1.2)
BUN SERPL-MCNC: 7 MG/DL (ref 8–23)
BUN SERPL-MCNC: 7 MG/DL (ref 8–23)
CALCIUM SERPL-MCNC: 10.1 MG/DL (ref 8.8–10.2)
CALCIUM SERPL-MCNC: 10.3 MG/DL (ref 8.8–10.2)
CHLORIDE SERPL-SCNC: 105 MMOL/L (ref 98–107)
CHLORIDE SERPL-SCNC: 106 MMOL/L (ref 98–107)
CO2 SERPL-SCNC: 16 MMOL/L (ref 20–28)
CO2 SERPL-SCNC: 20 MMOL/L (ref 20–28)
CREAT SERPL-MCNC: 0.41 MG/DL (ref 0.6–1.1)
CREAT SERPL-MCNC: 0.46 MG/DL (ref 0.6–1.1)
DIFFERENTIAL METHOD BLD: ABNORMAL
EKG ATRIAL RATE: 90 BPM
EKG DIAGNOSIS: NORMAL
EKG P AXIS: 48 DEGREES
EKG P-R INTERVAL: 190 MS
EKG Q-T INTERVAL: 388 MS
EKG QRS DURATION: 112 MS
EKG QTC CALCULATION (BAZETT): 474 MS
EKG R AXIS: -47 DEGREES
EKG T AXIS: 78 DEGREES
EKG VENTRICULAR RATE: 90 BPM
EOSINOPHIL # BLD: 0.2 K/UL (ref 0–0.8)
EOSINOPHIL NFR BLD: 4 % (ref 0.5–7.8)
ERYTHROCYTE [DISTWIDTH] IN BLOOD BY AUTOMATED COUNT: 14.6 % (ref 11.9–14.6)
ERYTHROCYTE [DISTWIDTH] IN BLOOD BY AUTOMATED COUNT: 14.7 % (ref 11.9–14.6)
GLOBULIN SER CALC-MCNC: 3.2 G/DL (ref 2.3–3.5)
GLOBULIN SER CALC-MCNC: 3.7 G/DL (ref 2.3–3.5)
GLUCOSE BLD STRIP.AUTO-MCNC: 169 MG/DL (ref 65–100)
GLUCOSE BLD STRIP.AUTO-MCNC: 94 MG/DL (ref 65–100)
GLUCOSE SERPL-MCNC: 100 MG/DL (ref 70–99)
GLUCOSE SERPL-MCNC: 116 MG/DL (ref 70–99)
HCO3 BLD-SCNC: 23.2 MMOL/L (ref 22–26)
HCT VFR BLD AUTO: 42.4 % (ref 35.8–46.3)
HCT VFR BLD AUTO: 46.3 % (ref 35.8–46.3)
HGB BLD-MCNC: 13.2 G/DL (ref 11.7–15.4)
HGB BLD-MCNC: 14.4 G/DL (ref 11.7–15.4)
IMM GRANULOCYTES # BLD AUTO: 0 K/UL (ref 0–0.5)
IMM GRANULOCYTES NFR BLD AUTO: 1 % (ref 0–5)
LYMPHOCYTES # BLD: 1.7 K/UL (ref 0.5–4.6)
LYMPHOCYTES NFR BLD: 41 % (ref 13–44)
MCH RBC QN AUTO: 29.5 PG (ref 26.1–32.9)
MCH RBC QN AUTO: 29.6 PG (ref 26.1–32.9)
MCHC RBC AUTO-ENTMCNC: 31.1 G/DL (ref 31.4–35)
MCHC RBC AUTO-ENTMCNC: 31.1 G/DL (ref 31.4–35)
MCV RBC AUTO: 94.9 FL (ref 82–102)
MCV RBC AUTO: 95.1 FL (ref 82–102)
MONOCYTES # BLD: 0.5 K/UL (ref 0.1–1.3)
MONOCYTES NFR BLD: 13 % (ref 4–12)
NEUTS SEG # BLD: 1.7 K/UL (ref 1.7–8.2)
NEUTS SEG NFR BLD: 42 % (ref 43–78)
NRBC # BLD: 0 K/UL (ref 0–0.2)
NRBC # BLD: 0 K/UL (ref 0–0.2)
O2/TOTAL GAS SETTING VFR VENT: 21 %
PCO2 BLD: 36.8 MMHG (ref 35–45)
PH BLD: 7.41 (ref 7.35–7.45)
PLATELET # BLD AUTO: 137 K/UL (ref 150–450)
PLATELET # BLD AUTO: 208 K/UL (ref 150–450)
PMV BLD AUTO: 10.3 FL (ref 9.4–12.3)
PMV BLD AUTO: 11.1 FL (ref 9.4–12.3)
PO2 BLD: 74 MMHG (ref 75–100)
POTASSIUM SERPL-SCNC: 3.9 MMOL/L (ref 3.5–5.1)
POTASSIUM SERPL-SCNC: 5.1 MMOL/L (ref 3.5–5.1)
PROT SERPL-MCNC: 6.4 G/DL (ref 6.3–8.2)
PROT SERPL-MCNC: 6.8 G/DL (ref 6.3–8.2)
RBC # BLD AUTO: 4.47 M/UL (ref 4.05–5.2)
RBC # BLD AUTO: 4.87 M/UL (ref 4.05–5.2)
SAO2 % BLD: 94.9 % (ref 95–98)
SERVICE CMNT-IMP: ABNORMAL
SERVICE CMNT-IMP: ABNORMAL
SERVICE CMNT-IMP: NORMAL
SODIUM SERPL-SCNC: 139 MMOL/L (ref 136–145)
SODIUM SERPL-SCNC: 140 MMOL/L (ref 136–145)
SPECIMEN TYPE: ABNORMAL
WBC # BLD AUTO: 4.2 K/UL (ref 4.3–11.1)
WBC # BLD AUTO: 4.6 K/UL (ref 4.3–11.1)

## 2024-05-15 PROCEDURE — 93005 ELECTROCARDIOGRAM TRACING: CPT | Performed by: INTERNAL MEDICINE

## 2024-05-15 PROCEDURE — 97166 OT EVAL MOD COMPLEX 45 MIN: CPT

## 2024-05-15 PROCEDURE — 85027 COMPLETE CBC AUTOMATED: CPT

## 2024-05-15 PROCEDURE — 97530 THERAPEUTIC ACTIVITIES: CPT

## 2024-05-15 PROCEDURE — 97535 SELF CARE MNGMENT TRAINING: CPT

## 2024-05-15 PROCEDURE — 92610 EVALUATE SWALLOWING FUNCTION: CPT

## 2024-05-15 PROCEDURE — 82803 BLOOD GASES ANY COMBINATION: CPT

## 2024-05-15 PROCEDURE — 97112 NEUROMUSCULAR REEDUCATION: CPT

## 2024-05-15 PROCEDURE — 94760 N-INVAS EAR/PLS OXIMETRY 1: CPT

## 2024-05-15 PROCEDURE — 85025 COMPLETE CBC W/AUTO DIFF WBC: CPT

## 2024-05-15 PROCEDURE — 6370000000 HC RX 637 (ALT 250 FOR IP): Performed by: STUDENT IN AN ORGANIZED HEALTH CARE EDUCATION/TRAINING PROGRAM

## 2024-05-15 PROCEDURE — 99222 1ST HOSP IP/OBS MODERATE 55: CPT | Performed by: NURSE PRACTITIONER

## 2024-05-15 PROCEDURE — 97162 PT EVAL MOD COMPLEX 30 MIN: CPT

## 2024-05-15 PROCEDURE — 80053 COMPREHEN METABOLIC PANEL: CPT

## 2024-05-15 PROCEDURE — 36600 WITHDRAWAL OF ARTERIAL BLOOD: CPT

## 2024-05-15 PROCEDURE — 93010 ELECTROCARDIOGRAM REPORT: CPT | Performed by: INTERNAL MEDICINE

## 2024-05-15 PROCEDURE — 6360000002 HC RX W HCPCS: Performed by: STUDENT IN AN ORGANIZED HEALTH CARE EDUCATION/TRAINING PROGRAM

## 2024-05-15 PROCEDURE — 82962 GLUCOSE BLOOD TEST: CPT

## 2024-05-15 PROCEDURE — 1100000003 HC PRIVATE W/ TELEMETRY

## 2024-05-15 PROCEDURE — 71045 X-RAY EXAM CHEST 1 VIEW: CPT

## 2024-05-15 PROCEDURE — 2580000003 HC RX 258: Performed by: STUDENT IN AN ORGANIZED HEALTH CARE EDUCATION/TRAINING PROGRAM

## 2024-05-15 PROCEDURE — 36415 COLL VENOUS BLD VENIPUNCTURE: CPT

## 2024-05-15 PROCEDURE — 6370000000 HC RX 637 (ALT 250 FOR IP): Performed by: INTERNAL MEDICINE

## 2024-05-15 RX ADMIN — OXYCODONE HYDROCHLORIDE AND ACETAMINOPHEN 500 MG: 500 TABLET ORAL at 09:46

## 2024-05-15 RX ADMIN — DOCUSATE SODIUM 50 MG AND SENNOSIDES 8.6 MG 2 TABLET: 8.6; 5 TABLET, FILM COATED ORAL at 09:45

## 2024-05-15 RX ADMIN — PREGABALIN 25 MG: 25 CAPSULE ORAL at 09:45

## 2024-05-15 RX ADMIN — DOCUSATE SODIUM 50 MG AND SENNOSIDES 8.6 MG 2 TABLET: 8.6; 5 TABLET, FILM COATED ORAL at 20:53

## 2024-05-15 RX ADMIN — SODIUM CHLORIDE, PRESERVATIVE FREE 10 ML: 5 INJECTION INTRAVENOUS at 20:53

## 2024-05-15 RX ADMIN — MONTELUKAST 10 MG: 10 TABLET, FILM COATED ORAL at 20:53

## 2024-05-15 RX ADMIN — CEFTRIAXONE 1000 MG: 1 INJECTION, POWDER, FOR SOLUTION INTRAMUSCULAR; INTRAVENOUS at 15:26

## 2024-05-15 RX ADMIN — APIXABAN 5 MG: 5 TABLET, FILM COATED ORAL at 20:53

## 2024-05-15 RX ADMIN — LATANOPROST 1 DROP: 50 SOLUTION OPHTHALMIC at 20:54

## 2024-05-15 RX ADMIN — DORZOLAMIDE HYDROCHLORIDE AND TIMOLOL MALEATE 1 DROP: 20; 5 SOLUTION/ DROPS OPHTHALMIC at 20:54

## 2024-05-15 RX ADMIN — PRAVASTATIN SODIUM 10 MG: 20 TABLET ORAL at 09:44

## 2024-05-15 RX ADMIN — METHIMAZOLE 5 MG: 5 TABLET ORAL at 09:46

## 2024-05-15 RX ADMIN — PREGABALIN 25 MG: 25 CAPSULE ORAL at 20:53

## 2024-05-15 RX ADMIN — METOPROLOL TARTRATE 25 MG: 25 TABLET, FILM COATED ORAL at 20:53

## 2024-05-15 RX ADMIN — CINACALCET HYDROCHLORIDE 30 MG: 30 TABLET, FILM COATED ORAL at 09:46

## 2024-05-15 RX ADMIN — APIXABAN 5 MG: 5 TABLET, FILM COATED ORAL at 09:46

## 2024-05-15 NOTE — PROGRESS NOTES
Hospitalist Progress Note   Admit Date:  2024 11:56 AM   Name:  Lilliam Song   Age:  89 y.o.  Sex:  female  :  1934   MRN:  [id number]   Room:  [facility]/    Presenting/Chief Complaint: Altered Mental Status     Reason(s) for Admission: TIA (transient ischemic attack) [G45.9]  Transient ischemic attack [G45.9]  Abnormal brain CT [R90.89]  Acute cystitis without hematuria [N30.00]     Hospital Course:   Lilliam Song is a 89 y.o. female with medical history of pulmonary embolism, hypertension, hyperparathyroidism, osteoporosis, peptic ulcer disease, TIA stroke, vitamin D deficiency, cerebrovascular disease who presented with facial droop and speech difficulty.  Patient's symptoms resolved upon arrival.  Initial NIH of 4 secondary to chronic lower extremity weakness.  No new focal deficits observed.  Patient currently at baseline.  Evidence of urinary tract infection on UA.  CT brain with some artifact in the cerebellum.  MRI brain pending at this time.  Patient will get worked up for TIA.  Started on IV Rocephin for urinary tract infection.  Urine and blood cultures pending.  Elevated blood pressure of 170/85.     Subjective & 24hr Events:     Patient reportedly had some agitation overnight and received one dose of Seroquel.  This morning she was drowsy but arousable on exam.  She did not localize any complaints.  Discussed care with daughter.  She was still somnolent later this afternoon which was somewhat concerning for daughter.  Labs resulted this afternoon showed mild hypercalcemia and acidosis.  Checking blood gas and chest x-ray now.    Addendum: Non-anion gap acidosis not confirmed on repeat labs, unclear if this was a lab error or if patient transiently had retention while resting.  CXR with no acute process, EKG unchanged from prior.  Will try to avoid sedating medications, monitor carefully.     Assessment & Plan:     Somnolence, alteration in consciousness  Initially thought to be due to  potassium bicarb-citric acid (EFFER-K) effervescent tablet 40 mEq  40 mEq Oral PRN    Or    potassium chloride 10 mEq/100 mL IVPB (Peripheral Line)  10 mEq IntraVENous PRN    magnesium sulfate 2000 mg in 50 mL IVPB premix  2,000 mg IntraVENous PRN    ondansetron (ZOFRAN-ODT) disintegrating tablet 4 mg  4 mg Oral Q8H PRN    Or    ondansetron (ZOFRAN) injection 4 mg  4 mg IntraVENous Q6H PRN    polyethylene glycol (GLYCOLAX) packet 17 g  17 g Oral Daily PRN    acetaminophen (TYLENOL) tablet 650 mg  650 mg Oral Q6H PRN    Or    acetaminophen (TYLENOL) suppository 650 mg  650 mg Rectal Q6H PRN    cefTRIAXone (ROCEPHIN) 1,000 mg in sterile water 10 mL IV syringe  1,000 mg IntraVENous Q24H    OLANZapine (ZyPREXA) 5 mg in sterile water 1 mL injection  5 mg IntraMUSCular Q12H PRN    OLANZapine zydis (ZYPREXA) disintegrating tablet 10 mg  10 mg Oral BID PRN       Signed:  Trenton Meraz MD    Part of this note may have been written by using a voice dictation software.  The note has been proof read but may still contain some grammatical/other typographical errors.

## 2024-05-15 NOTE — WOUND CARE
Recent home health pictures assessed for wounds,  Shallow open areas over coccyx and sacrum ,Most consistent with combination pressure and moisture related breakdown using zinc barrier cream, frequent turning ot home recommend pink foam dressing and frequent turning while in acute care. Bilateral heels with dry flaky skin and DTI, left is forming eschar recommend to offload with heel boots. Cleanse both heels with betadine and apply dry dressing daily.

## 2024-05-15 NOTE — DISCHARGE INSTRUCTIONS
Stroke: After Your Visit    Your Care Instructions    Risk factors for stroke include being overweight, smoking, and sedentary lifestyle. This means that the blood flow to a part of your brain was blocked for some time, which damages the nerve cells in that part of the brain. The part of your body controlled by that part of your brain may not function properly now.    The brain is an amazing organ that can heal itself to some degree. The stroke you had damaged part of your brain, but other parts of your brain may take over in some way for the damaged areas. You have already started this process.    Going home may be hard for you and your family. The more you can try to do for yourself, the better. Remember to take each day one at a time.    Follow-up care is a key part of your treatment and safety. Be sure to make and go to all appointments, and call your doctor if you are having problems. It's also a good idea to know your test results and keep a list of the medicines you take.    How can you care for yourself at home?   Enter a stroke rehabilitation (rehab) program, if your doctor recommends it. Physical, speech, and occupational therapies can help you manage bathing, dressing, eating, and other basics of daily living.  Eat a heart-healthy diet that is low in cholesterol, saturated fat, and salt. Eat lots of fresh fruits and vegetables and foods high in fiber.  Increase your activities slowly. Take short rest breaks when you get tired. Gradually increase the amount you walk. Start out by walking a little more than you did the day before.  Do not drive until your doctor says it is okay.  It is normal to feel sad or depressed after a stroke. If the “blues” last, talk to your doctor.  If you are having problems with urine leakage, go to the bathroom at regular times, including when you first wake up and at bedtime. Also, limit fluids after dinner.  If you are constipated, drink plenty of fluids, enough so that your  period, or heavy period bleeding.  You have new symptoms that may be related to your stroke, such as falls or trouble swallowing.    Watch closely for changes in your health, and be sure to contact your doctor if you have any problems.    Where can you learn more?    Go to http://www.pyco.net/[facility]    Enter C294  in the search box to learn more about \"Stroke: After Your Visit\".    © 9115-4440 Healthwise, Incorporated. Care instructions adapted under license by [employer] (which disclaims liability or warranty for this information). This care instruction is for use with your licensed healthcare professional. If you have questions about a medical condition or this instruction, always ask your healthcare professional. [employer] disclaims any warranty or liability for your use of this information.

## 2024-05-15 NOTE — PLAN OF CARE
Problem: Discharge Planning  Goal: Discharge to home or other facility with appropriate resources  5/14/2024 2225 by Shelley Holman RN  Outcome: Progressing  5/14/2024 1758 by Perla Cooper RN  Outcome: Progressing     Problem: Pain  Goal: Verbalizes/displays adequate comfort level or baseline comfort level  5/14/2024 2225 by Shelley Holman RN  Outcome: Progressing  5/14/2024 1758 by Perla Cooper RN  Outcome: Progressing     Problem: Skin/Tissue Integrity  Goal: Absence of new skin breakdown  Description: 1.  Monitor for areas of redness and/or skin breakdown  2.  Assess vascular access sites hourly  3.  Every 4-6 hours minimum:  Change oxygen saturation probe site  4.  Every 4-6 hours:  If on nasal continuous positive airway pressure, respiratory therapy assess nares and determine need for appliance change or resting period.  5/14/2024 2225 by Shelley Holman, RN  Outcome: Progressing  5/14/2024 1758 by Perla Cooper, RN  Outcome: Progressing     Problem: Safety - Adult  Goal: Free from fall injury  5/14/2024 2225 by Shelley Holman RN  Outcome: Progressing  5/14/2024 1758 by Perla Cooper, RN  Outcome: Progressing

## 2024-05-15 NOTE — PROGRESS NOTES
ABG drawn on R/A:  7.407  368  74.3  23.2  -1.5  94.9%    Conveyed same to Primary RN as well as Dr. Meraz       05/15/24 1610   Oxygen Therapy/Pulse Ox   O2 Therapy Room air   O2 Device None (Room air)   SpO2 95 %   Pulse Oximeter Device Mode Intermittent   $Pulse Oximeter $Spot check (single)   Blood Gas  Performed? Yes   $ABG $Arterial Puncture   Clinton's Test #1 Pos   Site #1 Left Radial   Site Prepped #1 Yes   Number of Attempts #1 1   Pressure Held #1 Yes   Complications #1 None   Post-procedure #1 Standard   Specimen Status #1 Point of care   How Tolerated? Tolerated well   Treatment Team Notification   Reason for Communication Review case  (ABG drawn on room air:)   Name of Team Member Notified Dr Trenton Meraz   Treatment Team Role Attending Provider   Method of Communication Secure Message   Response No new orders   Notification Time 8233

## 2024-05-15 NOTE — CARE COORDINATION
Chart reviewed by  for potential transition of care (ANJELICA) needs or concerns. CM met with pt/dtr at the bedside to complete CM assessment and discuss ANJELICA needs. Dtr confirmed pt is current with [facility] for RN/PT/OT/HHA services as well as [facility].  Pt's PCP is SARA Calix.  Dtr confirmed she wants the pt to dc home and resume HH services.  ELIZ order submitted to [facility].  Dtr requesting ambulance transport home which CM will arrange through [employer].  CM remains available to assist as needed.  Please notify/consult  if other ANJELICA needs arise.       05/15/24 1222   Service Assessment   Patient Orientation Other (see comment)  (confusion due to UTI; sleeping at time of CM visit; dtr reports pt is normally A&O)   Cognition Other (see comment)  (confusion due to UTI)   History Provided By Medical Record;Child/Family   Primary Caregiver Family   Accompanied By/Relationship dtr/[employer]   Support Systems Home Care Staff;Children;Family Members;Other (Comment)  (home palliative care)   Patient's Healthcare Decision Maker is: Legal Next of Kin   PCP Verified by CM Yes  ([facility])   Prior Functional Level Assistance with the following:;Mobility;Shopping;Housework;Cooking;Bathing;Dressing;Toileting   Current Functional Level Assistance with the following:;Bathing;Dressing;Toileting;Cooking;Housework;Shopping;Mobility   Can patient return to prior living arrangement Yes   Ability to make needs known: Fair   Family able to assist with home care needs: Yes   Would you like for me to discuss the discharge plan with any other family members/significant others, and if so, who? Yes  (dtr)   Financial Resources Medicare   Community Resources ECF/Home Care;Other (Comment)  (Palliative care)   Social/Functional History   Lives With Daughter   Type of Home House   Home Layout One level   Home Access Level entry   Home Equipment Mechanical lift;Walker - Rolling;Wheelchair - Manual   Receives Help

## 2024-05-15 NOTE — PROGRESS NOTES
ACUTE OCCUPATIONAL THERAPY GOALS:   (Developed with and agreed upon by patient and/or caregiver.)  Patient will roll side to side in bed with MINIMAL ASSIST.    2. Patient will tolerate sitting up in recliner chair for 2 hours with stable vital signs to increase upright tolerance in preparation for ADLs.    3. Patient will maintain static/dynamic sitting x 8 minutes with SBA for improved balance in preparation for ADLs.  4. Patient will follow 80 % of commands for increased participation in therapeutic activity/exercise.  5. Patient will complete grooming tasks with MINIMAL ASSIST and verbal cues as needed.   6. Patient will self feed 80% of meals after setup with verbal cues as needed.     Timeframe: 7 visits     OCCUPATIONAL THERAPY Initial Assessment, Daily Note, and AM       OT Visit Days: 1  Acknowledge Orders  Time  OT Charge Capture  Rehab Caseload Tracker      FALLS RISK, non ambulatory, family uses DULCE    Lilliam Song is a 89 y.o. female   PRIMARY DIAGNOSIS: TIA (transient ischemic attack)  TIA (transient ischemic attack) [G45.9]  Transient ischemic attack [G45.9]  Abnormal brain CT [R90.89]  Acute cystitis without hematuria [N30.00]       Reason for Referral: Generalized Muscle Weakness (M62.81)  Other lack of cordination (R27.8)  Difficulty in walking, Not elsewhere classified (R26.2)  Unspecified Lack of Coordination (R27.9)  Inpatient: Payor: T MEDICARE / Plan: T MEDICARE-ADVANTAGE PPO / Product Type: Medicare /     ASSESSMENT:     REHAB RECOMMENDATIONS:   Recommendation to date pending progress:  Setting:  Home Health Therapy    Equipment:    To Be Determined  Per chart, Has dulce lift, manual WC, RW  and an aide who helps with ADLs     ASSESSMENT:  Ms. Song is a 89 YO right hand dominant AAF admitted after family noted pt woke up with facial droop and slurred speech and has been diagnosed with above. NIH 4 but resolved to normal in ER. CT negative. MRI of head showed chronic changes.  face and hands   Personal Device Care [] [] [] [] [] [] [] [] [] [x]    Functional Mobility [] [] [] [] [] [] [] [] [x] [] X2 all mobility and resisting   I=Independent, Mod I=Modified Independent, S=Supervision/Setup, SBA=Standby Assistance, CGA=Contact Guard Assistance, Min=Minimal Assistance, Mod=Moderate Assistance, Max=Maximal Assistance, Total=Total Assistance, NT=Not Tested    PLAN:   FREQUENCY/DURATION   OT Plan of Care: 3 times/week for duration of hospital stay or until stated goals are met, whichever comes first.    PROBLEM LIST:   (Skilled intervention is medically necessary to address:)  Decreased ADL/Functional Activities  Decreased Activity Tolerance  Decreased AROM/PROM  Decreased Balance  Decreased Cognition  Decreased Coordination  Decreased Gait Ability  Decreased Safety Awareness  Decreased Strength  Decreased Transfer Abilities  Increased Pain   INTERVENTIONS PLANNED:  (Benefits and precautions of occupational therapy have been discussed with the patient.)  Self Care Training  Therapeutic Activity  Therapeutic Exercise/HEP  Neuromuscular Re-education  Gait Training  Manual Therapy  Modalities  Education         TREATMENT:     EVALUATION: MODERATE COMPLEXITY: (Untimed Charge)  The initial evaluation charge encompasses clinical chart review, objective assessment, interpretation of assessment, and skilled monitoring of the patient's response to treatment in order to develop a plan of care.     TREATMENT:   Co-Treatment PT/OT necessary due to patient's decreased overall endurance/tolerance levels, as well as need for high level skilled assistance to complete functional transfers/mobility and functional tasks  Neuromuscular Re-education (13 Minutes): Patient participated in neuromuscular re-education including functional reaching, weightbearing through affected upper extremity, weight shifting, postural training, visual scanning activity, midline training, positioning of affected angela-body, muscle

## 2024-05-15 NOTE — THERAPY EVALUATION
ACUTE PHYSICAL THERAPY GOALS:   (Developed with and agreed upon by patient and/or caregiver.)    (1.) Lilliam Song  will move from supine to sit and sit to supine , scoot up and down, and roll side to side with MODERATE ASSIST within 7 treatment day(s).    (2.) Lilliam Song will transfer from bed to chair and chair to bed with MODERATE ASSIST using the least restrictive device within 7 treatment day(s).    (3.) Lilliam Song will ambulate with MODERATE ASSIST for 5 feet with the least restrictive device within 7 treatment day(s).   (4.) Lilliam Song will perform static and dynamic sitting balance activities x 15 minutes with MINIMAL ASSIST to improve safety within 7 treatment day(s).  (5.) Lilliam Song will perform therapeutic exercises x 15 min for HEP with MINIMAL ASSIST to improve strength, endurance, and functional mobility within 7 treatment day(s).      PHYSICAL THERAPY Initial Assessment, Daily Note, and AM  (Link to Caseload Tracking: PT Visit Days : 1  Acknowledge Orders  Time In/Out  PT Charge Capture  Rehab Caseload Tracker    Lilliam Song is a 89 y.o. female   PRIMARY DIAGNOSIS: TIA (transient ischemic attack)  TIA (transient ischemic attack) [G45.9]  Transient ischemic attack [G45.9]  Abnormal brain CT [R90.89]  Acute cystitis without hematuria [N30.00]       Reason for Referral: Generalized Muscle Weakness (M62.81)  Difficulty in walking, Not elsewhere classified (R26.2)  Inpatient: Payor: [employer] MEDICARE / Plan: [facility] MEDICARE-ADVANTAGE PPO / Product Type: Medicare /     ASSESSMENT:     REHAB RECOMMENDATIONS:   Recommendation to date pending progress:  Setting:  Home Health Therapy    Equipment:    To Be Determined (has ford, manual w/c, 2WW)     ASSESSMENT:  Ms. Song is a 89 year old female who presents with slurred speech and facial droop and is admitted for CVA/TIA work-up. CT/MRI negative for acute changes at this time but is being treated for UTI. Patient a little lethargic and

## 2024-05-15 NOTE — PROGRESS NOTES
GOALS:  LTG: Patient will maintain adequate hydration/nutrition with optimum safety and efficiency of swallowing function with PO intake without overt signs or symptoms of aspiration for the highest appropriate diet level.  STG:  Patient will consume pureed textures and thin liquids without overt signs or symptoms of airway compromise.  Patient will safely ingest diet trials during therapeutic feedings with SLP without overt signs or symptoms of respiratory compromise in efforts to advance diet.  Patient will complete a Modified Barium Swallow study to fully assess physiology and anatomy of the swallow and determine safest appropriate diet and/or rehabilitation strategies, as medically indicated.    SPEECH LANGUAGE PATHOLOGY: DYSPHAGIA Initial Assessment    Acknowledge Order  I  Therapy Time  I   Charges     I  Rehab Caseload Tracker      NAME: Lilliam Song  : 1934  MRN: [id number]    ADMISSION DATE: 2024  PRIMARY DIAGNOSIS: TIA (transient ischemic attack)    ICD-10: Treatment Diagnosis: R13.12 Dysphagia, Oropharyngeal Phase    RECOMMENDATIONS   Diet:    Pureed  Thin Liquids    Medication: whole floated in puree or applesauce and crushed in puree or applesauce, as permitted by pharmacy   Compensatory Swallowing Strategies:   Slow rate of intake  Small bites/sips  Total feed  Upright as possible for all oral intake  Supervision with all intake    Therapeutic Intervention:   Patient/family education  Dysphagia treatment   Patient continues to require skilled intervention:  Yes. Recommend ongoing speech therapy services during this hospitalization.     Anticipated Discharge Needs: Ongoing speech therapy is recommended at next level of care.      ASSESSMENT    Patient presents with AMS impacting oral intake, requiring total feeding assistance. No overt indications of airway compromise observed this date. Patient fell asleep with soft solid in mouth, required cues to maintain alertness throughout session.

## 2024-05-15 NOTE — PROGRESS NOTES
Comprehensive Nutrition Assessment    Type and Reason for Visit: Initial, Positive Nutrition Screen  Malnutrition Screening Tool: Malnutrition Screen  Have you recently lost weight without trying?: 24 to 33 pounds (3 points)  Have you been eating poorly because of a decreased appetite?: No (0 points)  Malnutrition Screening Tool Score: 3    Nutrition Recommendations/Plan:   Meals and Snacks:  Diet: Continue current order  Nutrition Supplement Therapy:  Medical food supplement therapy:  Initiate Ensure Enlive three times per day (this provides 350 kcal and 20 grams protein per bottle)     Malnutrition Assessment:  Malnutrition Status: At risk for malnutrition (Comment) (hx of poor po and wt loss however po improving and wt trending up over the past month)    Limitd NFPE due to pts drowsiness. NFPE showed temporal buccal and clavicle wasting.  Nutrition Assessment:  Nutrition History: Daughter reports poor po prior to previous admission ~1 month ago. She stated since then pts intake has improved. Daughter reports pt is consuming 2 meals and some snacks each day. Daughter reports pt uses Ensure to supplement intake when her appetite is poor.      Do You Have Any Cultural, Gnosticist, or Ethnic Food Preferences?: No   Weight History: Daughter reports over all wt loss however she stated pt has gained some wt since her last admission. Per EMR wt hx review 4/25/23 164lb, 12/5 170lb, 3/7 143lb.   Nutrition Background:       PMH significant for pulmonary embolism, HTN, hyperparathyroidism, PUD, vitamin D deficiency, and HLD. Pt admitted with TIA and a UTI.   Nutrition Interval:  Pt seen asleep in bed with daughter present. Daughter reports pt received Seroquel last night and she has been sleeping all day. Daughter stated pt has not eaten today due to sleepiness. She stated pt will usually consume strawberry Ensure well, will send with meals.     Current Nutrition Therapies:  ADULT DIET; Dysphagia - Pureed    Current Intake:

## 2024-05-15 NOTE — PROGRESS NOTES
PT was in bed sleeping with Family at bedside. CH introduced self. Daughter shared concerns and hopes. CH checked for unmet needs and offered support.    [employer] [facility] [employer] M.Div.

## 2024-05-15 NOTE — CONSULTS
Consult    Patient: Lilliam Song MRN: [id number]     YOB: 1934  Age: 89 y.o.  Sex: female      Subjective:      Lilliam Song is a 89 y.o. female who is being seen for facial droop. The patient is unable to provide history and history is obtained from the chart. She presented to the ED with facial droop and speech difficulty. She was found to have a UTI.     Past Medical History:   Diagnosis Date    Abnormality of gait 5/20/2015    Allergic rhinitis 6/4/2013    Asymptomatic bilateral carotid artery stenosis 10/14/2014    Cerebrovascular disease 5/20/2015    Essential hypertension, benign 6/4/2013    GI bleed     due to ulcer    Glaucoma 5/20/2015    Hx of seasonal allergies     Hyperparathyroidism (HCC) 6/4/2013    Occlusion and stenosis of carotid artery without mention of cerebral infarction 5/20/2015    Osteoporosis, unspecified 6/4/2013    Other and unspecified hyperlipidemia 6/4/2013    Other decreased white blood cell count 6/4/2013    PUD (peptic ulcer disease)     Pulmonary embolism ([facility]) 03/2024    SNHL (sensorineural hearing loss) 6/4/2013    Stroke ([facility]) 2006    TIA     Toxic multinodular goiter 6/4/2013    Toxic multinodular goiter without mention of thyrotoxic crisis or storm 6/4/2013    hyperthyroidism     Transient ischemic attack (TIA), and cerebral infarction without residual deficits(V12.54) 6/4/2013    Vitamin D deficiency      Past Surgical History:   Procedure Laterality Date    CATARACT REMOVAL Bilateral     COLONOSCOPY      WISDOM TOOTH EXTRACTION        Family History   Problem Relation Age of Onset    Glaucoma Son     Hypertension Sister     No Known Problems Sister     Thyroid Disease Sister     Hypertension Son     Thyroid Disease Mother         goiter    Diabetes Mother     Drug Abuse Son     Hypertension Daughter     Other Mother         brain aneurysm      Social History     Tobacco Use    Smoking status: Never    Smokeless tobacco: Never   Substance Use Topics

## 2024-05-16 ENCOUNTER — HOME CARE VISIT (OUTPATIENT)
Dept: HOME HEALTH SERVICES | Facility: HOME HEALTH | Age: 89
End: 2024-05-16
Payer: MEDICARE

## 2024-05-16 VITALS
WEIGHT: 155.65 LBS | DIASTOLIC BLOOD PRESSURE: 69 MMHG | BODY MASS INDEX: 25.93 KG/M2 | OXYGEN SATURATION: 98 % | SYSTOLIC BLOOD PRESSURE: 137 MMHG | RESPIRATION RATE: 18 BRPM | HEART RATE: 70 BPM | TEMPERATURE: 98.8 F | HEIGHT: 65 IN

## 2024-05-16 DIAGNOSIS — E21.3 HYPERPARATHYROIDISM (HCC): ICD-10-CM

## 2024-05-16 DIAGNOSIS — E78.5 DYSLIPIDEMIA: ICD-10-CM

## 2024-05-16 DIAGNOSIS — I10 ESSENTIAL HYPERTENSION: Primary | ICD-10-CM

## 2024-05-16 DIAGNOSIS — E83.52 HYPERCALCEMIA: ICD-10-CM

## 2024-05-16 PROBLEM — N30.00 ACUTE CYSTITIS WITHOUT HEMATURIA: Status: ACTIVE | Noted: 2024-05-16

## 2024-05-16 LAB
ANION GAP SERPL CALC-SCNC: 15 MMOL/L (ref 9–18)
BUN SERPL-MCNC: 7 MG/DL (ref 8–23)
CALCIUM SERPL-MCNC: 10 MG/DL (ref 8.8–10.2)
CHLORIDE SERPL-SCNC: 101 MMOL/L (ref 98–107)
CO2 SERPL-SCNC: 22 MMOL/L (ref 20–28)
CREAT SERPL-MCNC: 0.37 MG/DL (ref 0.6–1.1)
ERYTHROCYTE [DISTWIDTH] IN BLOOD BY AUTOMATED COUNT: 14.8 % (ref 11.9–14.6)
GLUCOSE SERPL-MCNC: 78 MG/DL (ref 70–99)
HCT VFR BLD AUTO: 40 % (ref 35.8–46.3)
HGB BLD-MCNC: 12.6 G/DL (ref 11.7–15.4)
MCH RBC QN AUTO: 29.9 PG (ref 26.1–32.9)
MCHC RBC AUTO-ENTMCNC: 31.5 G/DL (ref 31.4–35)
MCV RBC AUTO: 95 FL (ref 82–102)
NRBC # BLD: 0 K/UL (ref 0–0.2)
PLATELET # BLD AUTO: 261 K/UL (ref 150–450)
PMV BLD AUTO: 10.8 FL (ref 9.4–12.3)
POTASSIUM SERPL-SCNC: 3.7 MMOL/L (ref 3.5–5.1)
RBC # BLD AUTO: 4.21 M/UL (ref 4.05–5.2)
SODIUM SERPL-SCNC: 138 MMOL/L (ref 136–145)
WBC # BLD AUTO: 9 K/UL (ref 4.3–11.1)

## 2024-05-16 PROCEDURE — 85027 COMPLETE CBC AUTOMATED: CPT

## 2024-05-16 PROCEDURE — 36415 COLL VENOUS BLD VENIPUNCTURE: CPT

## 2024-05-16 PROCEDURE — 6360000002 HC RX W HCPCS: Performed by: FAMILY MEDICINE

## 2024-05-16 PROCEDURE — 80048 BASIC METABOLIC PNL TOTAL CA: CPT

## 2024-05-16 PROCEDURE — 6370000000 HC RX 637 (ALT 250 FOR IP): Performed by: STUDENT IN AN ORGANIZED HEALTH CARE EDUCATION/TRAINING PROGRAM

## 2024-05-16 PROCEDURE — 6370000000 HC RX 637 (ALT 250 FOR IP): Performed by: INTERNAL MEDICINE

## 2024-05-16 PROCEDURE — 2580000003 HC RX 258: Performed by: STUDENT IN AN ORGANIZED HEALTH CARE EDUCATION/TRAINING PROGRAM

## 2024-05-16 RX ORDER — HYDRALAZINE HYDROCHLORIDE 20 MG/ML
10 INJECTION INTRAMUSCULAR; INTRAVENOUS ONCE
Status: COMPLETED | OUTPATIENT
Start: 2024-05-16 | End: 2024-05-16

## 2024-05-16 RX ORDER — CEFPODOXIME PROXETIL 100 MG/1
100 TABLET, FILM COATED ORAL 2 TIMES DAILY
Qty: 10 TABLET | Refills: 0 | Status: SHIPPED | OUTPATIENT
Start: 2024-05-16 | End: 2024-05-21

## 2024-05-16 RX ADMIN — ACETAMINOPHEN 650 MG: 325 TABLET ORAL at 04:24

## 2024-05-16 RX ADMIN — APIXABAN 5 MG: 5 TABLET, FILM COATED ORAL at 09:22

## 2024-05-16 RX ADMIN — PREGABALIN 25 MG: 25 CAPSULE ORAL at 09:22

## 2024-05-16 RX ADMIN — CINACALCET HYDROCHLORIDE 30 MG: 30 TABLET, FILM COATED ORAL at 09:22

## 2024-05-16 RX ADMIN — METOPROLOL TARTRATE 25 MG: 25 TABLET, FILM COATED ORAL at 09:22

## 2024-05-16 RX ADMIN — SODIUM CHLORIDE, PRESERVATIVE FREE 10 ML: 5 INJECTION INTRAVENOUS at 09:24

## 2024-05-16 RX ADMIN — METHIMAZOLE 5 MG: 5 TABLET ORAL at 09:22

## 2024-05-16 RX ADMIN — HYDRALAZINE HYDROCHLORIDE 10 MG: 20 INJECTION INTRAMUSCULAR; INTRAVENOUS at 01:32

## 2024-05-16 RX ADMIN — HYDROCODONE BITARTRATE AND ACETAMINOPHEN 1 TABLET: 5; 325 TABLET ORAL at 03:10

## 2024-05-16 RX ADMIN — DORZOLAMIDE HYDROCHLORIDE AND TIMOLOL MALEATE 1 DROP: 20; 5 SOLUTION/ DROPS OPHTHALMIC at 09:28

## 2024-05-16 RX ADMIN — DOCUSATE SODIUM 50 MG AND SENNOSIDES 8.6 MG 2 TABLET: 8.6; 5 TABLET, FILM COATED ORAL at 09:24

## 2024-05-16 ASSESSMENT — PAIN SCALES - GENERAL
PAINLEVEL_OUTOF10: 0
PAINLEVEL_OUTOF10: 6

## 2024-05-16 ASSESSMENT — PAIN DESCRIPTION - DESCRIPTORS: DESCRIPTORS: ACHING

## 2024-05-16 ASSESSMENT — PAIN DESCRIPTION - LOCATION: LOCATION: GENERALIZED

## 2024-05-16 ASSESSMENT — PAIN DESCRIPTION - ORIENTATION: ORIENTATION: INNER

## 2024-05-16 ASSESSMENT — PAIN - FUNCTIONAL ASSESSMENT: PAIN_FUNCTIONAL_ASSESSMENT: ACTIVITIES ARE NOT PREVENTED

## 2024-05-16 NOTE — CARE COORDINATION
No   Patient's  Info Daughter   Occupation Retired   Discharge Planning   Type of Residence House   Living Arrangements Children   Current Services Prior To Admission Home Care;Other (Comment);Durable Medical Equipment  ([facility])   Current DME Prior to Arrival Hospital Bed;Walker;Other (Comment);Wheelchair  (ford lift)   Potential Assistance Needed Home Care   DME Ordered? No   Potential Assistance Purchasing Medications No   Type of Home Care Services Aide Services;PT;OT;Nursing Services   Patient expects to be discharged to: House   Services At/After Discharge   Transition of Care Consult (CM Consult) Home Health  (no CM consult received)   Internal Home Health Yes   Services At/After Discharge Home Health;OT;PT;Nursing services;Aide services    Resource Information Provided? No   Mode of Transport at Discharge BLS  (per dtr's request- CM explained to dtr that insurance may not cover the transport and they would be financially responsible.  She verbalized understanding and requested ambulance transport.)   Hospital Transport Time of Discharge 1430   Confirm Follow Up Transport Family   Condition of Participation: Discharge Planning   The Plan for Transition of Care is related to the following treatment goals: Home with family and resume previously ordered HH RN/PT/OT/HHA services to support pt's care and recovery in the home   The Patient and/or Patient Representative was provided with a Choice of Provider? Patient Representative   Name of the Patient Representative who was provided with the Choice of Provider and agrees with the Discharge Plan?  dtr/Allie   The Patient and/Or Patient Representative agree with the Discharge Plan? Yes   Freedom of Choice list was provided with basic dialogue that supports the patient's individualized plan of care/goals, treatment preferences, and shares the quality data associated with the providers?  No  (pt is current with [facility] and wishes to  remain with this agency)

## 2024-05-16 NOTE — PLAN OF CARE
Problem: Discharge Planning  Goal: Discharge to home or other facility with appropriate resources  5/15/2024 2006 by Shelley Holman RN  Outcome: Progressing  5/15/2024 1507 by Rose Dunn RN  Outcome: Progressing     Problem: Pain  Goal: Verbalizes/displays adequate comfort level or baseline comfort level  5/15/2024 2006 by Shelley Hloman RN  Outcome: Progressing  5/15/2024 1507 by Rose Dunn RN  Outcome: Progressing     Problem: Skin/Tissue Integrity  Goal: Absence of new skin breakdown  Description: 1.  Monitor for areas of redness and/or skin breakdown  2.  Assess vascular access sites hourly  3.  Every 4-6 hours minimum:  Change oxygen saturation probe site  4.  Every 4-6 hours:  If on nasal continuous positive airway pressure, respiratory therapy assess nares and determine need for appliance change or resting period.  5/15/2024 2006 by Shelley Holman RN  Outcome: Progressing  5/15/2024 1507 by Rose Dunn RN  Outcome: Progressing     Problem: Safety - Adult  Goal: Free from fall injury  5/15/2024 2006 by Shelley Holman RN  Outcome: Progressing  5/15/2024 1507 by Rose Dunn RN  Outcome: Progressing     Problem: Chronic Conditions and Co-morbidities  Goal: Patient's chronic conditions and co-morbidity symptoms are monitored and maintained or improved  5/15/2024 2006 by Shelley Holman RN  Outcome: Progressing  5/15/2024 1507 by Rose Dunn RN  Outcome: Progressing

## 2024-05-16 NOTE — DISCHARGE SUMMARY
Hospitalist Discharge Summary   Admit Date:  2024 11:56 AM   DC Note date: 2024  Name:  Lilliam Song   Age:  89 y.o.  Sex:  female  :  1934   MRN:  [id number]   Room:  [facility]  PCP:  Yash Heredia DO    Presenting Complaint: Altered Mental Status     Initial Admission Diagnosis: TIA (transient ischemic attack) [G45.9]  Transient ischemic attack [G45.9]  Abnormal brain CT [R90.89]  Acute cystitis without hematuria [N30.00]     Problem List for this Hospitalization (present on admission):    Active Problems:    Cerebrovascular disease    PAD (peripheral artery disease) (HCC)    Hyperparathyroidism (HCC)    Urinary tract infection    Hyperthyroidism    Acute cystitis without hematuria  Resolved Problems:    * No resolved hospital problems. *      Hospital Course:  Lilliam Song is a 89 y.o. female with medical history of pulmonary embolism, hypertension, hyperparathyroidism, osteoporosis, peptic ulcer disease, TIA stroke, vitamin D deficiency, cerebrovascular disease who presented with facial droop and speech difficulty as reported by the daughter. Patient's symptoms resolved upon arrival. Initial NIH of 4 secondary to chronic lower extremity weakness.  According to the daughter other associated symptoms at that time included decreased responsiveness, less interactive, decreased appetite and decreased activities.  In the ER, TM 98.6 °F, RR 18, HR 72, /83, SpO2 97% on room air.  CBC was unremarkable.  CMP was unremarkable.  UA grossly positive for UTI.  CT brain with some artifact in the cerebellum but otherwise no acute findings.  She received 1 g of IV Rocephin.  She was admitted to the floor for further workup of her altered mental status.    On the floor, IV Rocephin was continued.  She overall continued to improve however had intermittent episodes of delirium/sundowning overnight.  Labs continued to remain stable.  MRI brain was without CVA and findings only significant for chronic  coordination 44 minutes.        Follow up labs/diagnostics (ultimately defer to outpatient provider):  Defer to Follow-up Provider and Urine cultures and blood pressures    Plan was discussed with Patient and Child of patient.  All questions answered.  Patient was stable at time of discharge.  Instructions given to call a physician or return if any concerns.    Pending Labs       Order Current Status    Culture, Blood 1 Preliminary result    Culture, Blood 1 Preliminary result    Culture, Urine Preliminary result            Current Discharge Medication List        START taking these medications    Details   cefpodoxime (VANTIN) 100 MG tablet Take 1 tablet by mouth 2 times daily for 5 days  Qty: 10 tablet, Refills: 0           CONTINUE these medications which have NOT CHANGED    Details   morphine (MSIR) 15 MG tablet Take 1 tablet by mouth in the morning and 1 tablet in the evening.      HYDROcodone-acetaminophen (NORCO) 5-325 MG per tablet Take 1 tablet by mouth every 6 hours as needed for Pain.      polyethylene glycol (GLYCOLAX) 17 g packet Take 1 packet by mouth daily  Qty: 527 g, Refills: 1      apixaban (ELIQUIS) 5 MG TABS tablet Take 1 tablet by mouth 2 times daily  Qty: 60 tablet, Refills: 0    Associated Diagnoses: Acute pulmonary embolism without acute cor pulmonale, unspecified pulmonary embolism type (HCC)      pregabalin (LYRICA) 25 MG capsule Take 1 capsule by mouth 2 times daily for 60 days. Separate from pain medication by at least several hours.  Do not drink alcohol, drive or operate heavy machinery after use. Max Daily Amount: 50 mg  Qty: 60 capsule, Refills: 1    Associated Diagnoses: Right knee pain, unspecified chronicity; Pain syndrome, chronic      metoprolol tartrate (LOPRESSOR) 25 MG tablet Take 1 tablet by mouth 2 times daily  Qty: 60 tablet, Refills: 1      sennosides-docusate sodium (SENOKOT-S) 8.6-50 MG tablet Take 2 tablets by mouth in the morning and at bedtime  Qty: 60 tablet,

## 2024-05-17 ENCOUNTER — CARE COORDINATION (OUTPATIENT)
Dept: CARE COORDINATION | Facility: CLINIC | Age: 89
End: 2024-05-17

## 2024-05-17 ENCOUNTER — TELEPHONE (OUTPATIENT)
Dept: INTERNAL MEDICINE CLINIC | Facility: CLINIC | Age: 89
End: 2024-05-17

## 2024-05-17 ENCOUNTER — HOME CARE VISIT (OUTPATIENT)
Dept: SCHEDULING | Facility: HOME HEALTH | Age: 89
End: 2024-05-17
Payer: MEDICARE

## 2024-05-17 VITALS
OXYGEN SATURATION: 96 % | HEART RATE: 72 BPM | DIASTOLIC BLOOD PRESSURE: 70 MMHG | SYSTOLIC BLOOD PRESSURE: 137 MMHG | TEMPERATURE: 97.8 F

## 2024-05-17 LAB
BACTERIA SPEC CULT: ABNORMAL
BACTERIA SPEC CULT: ABNORMAL
SERVICE CMNT-IMP: ABNORMAL

## 2024-05-17 PROCEDURE — G0299 HHS/HOSPICE OF RN EA 15 MIN: HCPCS

## 2024-05-17 ASSESSMENT — ENCOUNTER SYMPTOMS
STOOL DESCRIPTION: SEMI-FLUID
BOWEL INCONTINENCE: 1
PAIN LOCATION - PAIN QUALITY: ACHY

## 2024-05-17 NOTE — CARE COORDINATION
entered: yes    Was patient discharged with a pulse oximeter? no    Non-face-to-face services provided:  Scheduled appointment with PCP-on 5/28/24 in office, but daughter is requesting a VV follow up. CTN called and left a message requesting VV is possible. Daughter notified.   Scheduled appointment with Specialist-ortho on 5/29/24 and 6/5/24  Obtained and reviewed discharge summary and/or continuity of care documents  Education of patient/family/caregiver/guardian to support self-management-medications management and follow up appointments. Patient has good support from her daughter. Patient was discharged with [facility] and arelis was today per daughter. Patient also has [facility] and they are scheduled to visit today per daughter.   Offered patient enrollment in the Remote Patient Monitoring (RPM) program for in-home monitoring: Yes, but did not enroll at this time: declines .  Care Transitions 24 Hour Call    Schedule Follow Up Appointment with PCP: Completed  Do you have a copy of your discharge instructions?: Yes  Do you have all of your prescriptions and are they filled?: Yes  Have you been contacted by a [facility] Pharmacist?: No  Have you scheduled your follow up appointment?: Yes  Post Acute Services: Home Health, Other  Do you have support at home?: Child  Do you feel like you have everything you need to keep you well at home?: Yes  Are you an active caregiver in your home?: No    Discussed follow-up appointments. If no appointment was previously scheduled, appointment scheduling offered: Yes.   Is follow up appointment scheduled within 7 days of discharge? 5/28/24    Follow Up  Future Appointments   Date Time Provider Department Center   5/17/2024  1:00 PM Perri Farris, KIRBY [facility]   5/21/2024  9:45 AM MAT LAB [facility]   5/28/2024 10:40 AM Yash Heredia DO [facility]   5/29/2024 To Be Determined Charline Watts RN [facility]   5/29/2024  1:00 PM Jhony De MD [facility]

## 2024-05-17 NOTE — TELEPHONE ENCOUNTER
NELSON on 5/17/24 @ 0908 to have pt's daughter return my call to complete TCM call and to see if pt can come in @ 9:00 on the 28th with Dr. Heredia to have 40 mins instead of currently scheduled appt @ 10:40.

## 2024-05-18 ASSESSMENT — ENCOUNTER SYMPTOMS: DIARRHEA: 1

## 2024-05-20 ENCOUNTER — HOME CARE VISIT (OUTPATIENT)
Dept: SCHEDULING | Facility: HOME HEALTH | Age: 89
End: 2024-05-20
Payer: MEDICARE

## 2024-05-21 ENCOUNTER — HOSPITAL ENCOUNTER (OUTPATIENT)
Age: 89
Setting detail: SPECIMEN
Discharge: HOME | End: 2024-05-24
Payer: MEDICARE

## 2024-05-21 ENCOUNTER — HOME CARE VISIT (OUTPATIENT)
Dept: SCHEDULING | Facility: HOME HEALTH | Age: 89
End: 2024-05-21

## 2024-05-21 LAB
ALBUMIN SERPL-MCNC: 2.5 G/DL (ref 3.2–4.6)
ALBUMIN/GLOB SERPL: 0.9 (ref 1–1.9)
ALP SERPL-CCNC: 78 U/L (ref 35–104)
ALT SERPL-CCNC: 7 U/L (ref 12–65)
ANION GAP SERPL CALC-SCNC: 11 MMOL/L (ref 9–18)
AST SERPL-CCNC: 18 U/L (ref 15–37)
BASOPHILS # BLD: 0 K/UL (ref 0–0.2)
BASOPHILS NFR BLD: 0 % (ref 0–2)
BILIRUB SERPL-MCNC: 0.7 MG/DL (ref 0–1.2)
BUN SERPL-MCNC: 6 MG/DL (ref 8–23)
CALCIUM SERPL-MCNC: 10 MG/DL (ref 8.8–10.2)
CHLORIDE SERPL-SCNC: 103 MMOL/L (ref 98–107)
CHOLEST SERPL-MCNC: 116 MG/DL (ref 0–200)
CO2 SERPL-SCNC: 26 MMOL/L (ref 20–28)
CREAT SERPL-MCNC: 0.38 MG/DL (ref 0.6–1.1)
DIFFERENTIAL METHOD BLD: ABNORMAL
EOSINOPHIL # BLD: 0.4 K/UL (ref 0–0.8)
EOSINOPHIL NFR BLD: 8 % (ref 0.5–7.8)
ERYTHROCYTE [DISTWIDTH] IN BLOOD BY AUTOMATED COUNT: 14.7 % (ref 11.9–14.6)
GLOBULIN SER CALC-MCNC: 2.9 G/DL (ref 2.3–3.5)
GLUCOSE SERPL-MCNC: 76 MG/DL (ref 70–99)
HCT VFR BLD AUTO: 33.8 % (ref 35.8–46.3)
HDLC SERPL-MCNC: 39 MG/DL (ref 40–60)
HDLC SERPL: 3 (ref 0–5)
HGB BLD-MCNC: 10.5 G/DL (ref 11.7–15.4)
IMM GRANULOCYTES # BLD AUTO: 0 K/UL (ref 0–0.5)
IMM GRANULOCYTES NFR BLD AUTO: 1 % (ref 0–5)
LDLC SERPL CALC-MCNC: 60 MG/DL (ref 0–100)
LYMPHOCYTES # BLD: 1.2 K/UL (ref 0.5–4.6)
LYMPHOCYTES NFR BLD: 26 % (ref 13–44)
MCH RBC QN AUTO: 29.5 PG (ref 26.1–32.9)
MCHC RBC AUTO-ENTMCNC: 31.1 G/DL (ref 31.4–35)
MCV RBC AUTO: 94.9 FL (ref 82–102)
MONOCYTES # BLD: 0.5 K/UL (ref 0.1–1.3)
MONOCYTES NFR BLD: 12 % (ref 4–12)
NEUTS SEG # BLD: 2.5 K/UL (ref 1.7–8.2)
NEUTS SEG NFR BLD: 54 % (ref 43–78)
NRBC # BLD: 0 K/UL (ref 0–0.2)
PHOSPHATE SERPL-MCNC: 2.5 MG/DL (ref 2.5–4.5)
PLATELET # BLD AUTO: 322 K/UL (ref 150–450)
PMV BLD AUTO: 10.4 FL (ref 9.4–12.3)
POTASSIUM SERPL-SCNC: 4.1 MMOL/L (ref 3.5–5.1)
PROT SERPL-MCNC: 5.4 G/DL (ref 6.3–8.2)
RBC # BLD AUTO: 3.56 M/UL (ref 4.05–5.2)
SODIUM SERPL-SCNC: 140 MMOL/L (ref 136–145)
TRIGL SERPL-MCNC: 85 MG/DL (ref 0–150)
VLDLC SERPL CALC-MCNC: 17 MG/DL (ref 6–23)
WBC # BLD AUTO: 4.7 K/UL (ref 4.3–11.1)

## 2024-05-21 PROCEDURE — 80053 COMPREHEN METABOLIC PANEL: CPT

## 2024-05-21 PROCEDURE — G0299 HHS/HOSPICE OF RN EA 15 MIN: HCPCS

## 2024-05-21 PROCEDURE — 84100 ASSAY OF PHOSPHORUS: CPT

## 2024-05-21 PROCEDURE — 85025 COMPLETE CBC W/AUTO DIFF WBC: CPT

## 2024-05-21 PROCEDURE — 82330 ASSAY OF CALCIUM: CPT

## 2024-05-21 PROCEDURE — 80061 LIPID PANEL: CPT

## 2024-05-21 PROCEDURE — 83970 ASSAY OF PARATHORMONE: CPT

## 2024-05-22 ENCOUNTER — CARE COORDINATION (OUTPATIENT)
Dept: CARE COORDINATION | Facility: CLINIC | Age: 89
End: 2024-05-22

## 2024-05-22 ENCOUNTER — HOME CARE VISIT (OUTPATIENT)
Dept: SCHEDULING | Facility: HOME HEALTH | Age: 89
End: 2024-05-22
Payer: MEDICARE

## 2024-05-22 ENCOUNTER — PATIENT MESSAGE (OUTPATIENT)
Dept: INTERNAL MEDICINE CLINIC | Facility: CLINIC | Age: 89
End: 2024-05-22

## 2024-05-22 VITALS
RESPIRATION RATE: 18 BRPM | OXYGEN SATURATION: 92 % | DIASTOLIC BLOOD PRESSURE: 70 MMHG | SYSTOLIC BLOOD PRESSURE: 140 MMHG | TEMPERATURE: 95.9 F | HEART RATE: 66 BPM

## 2024-05-22 LAB
CALCIUM SERPL-MCNC: 10.1 MG/DL (ref 8.8–10.2)
PTH-INTACT SERPL-MCNC: 67.3 PG/ML (ref 15–65)

## 2024-05-22 PROCEDURE — G0152 HHCP-SERV OF OT,EA 15 MIN: HCPCS

## 2024-05-22 RX ORDER — SENNA AND DOCUSATE SODIUM 50; 8.6 MG/1; MG/1
2 TABLET, FILM COATED ORAL 2 TIMES DAILY
Qty: 120 TABLET | Refills: 1 | Status: SHIPPED | OUTPATIENT
Start: 2024-05-22

## 2024-05-22 ASSESSMENT — ENCOUNTER SYMPTOMS: STOOL DESCRIPTION: MUSHY

## 2024-05-22 NOTE — CARE COORDINATION
assess for continued improvements and/or any new or worsening signs and symptoms to report and follow up.     Dena Montgomery RN

## 2024-05-22 NOTE — TELEPHONE ENCOUNTER
From: Lilliam Song  To: Dr. Yash Heredia  Sent: 5/22/2024 2:46 PM EDT  Subject: Senna script    The Senna laxative will run out this week. Can mom get a refill$

## 2024-05-23 ENCOUNTER — HOSPICE ADMISSION (OUTPATIENT)
Dept: HOSPICE | Facility: HOSPICE | Age: 89
End: 2024-05-23
Payer: MEDICARE

## 2024-05-23 ENCOUNTER — TELEPHONE (OUTPATIENT)
Dept: INTERNAL MEDICINE CLINIC | Facility: CLINIC | Age: 89
End: 2024-05-23

## 2024-05-23 ENCOUNTER — HOME CARE VISIT (OUTPATIENT)
Dept: SCHEDULING | Facility: HOME HEALTH | Age: 89
End: 2024-05-23
Payer: MEDICARE

## 2024-05-23 VITALS
RESPIRATION RATE: 22 BRPM | DIASTOLIC BLOOD PRESSURE: 80 MMHG | TEMPERATURE: 98 F | SYSTOLIC BLOOD PRESSURE: 150 MMHG | HEART RATE: 74 BPM | OXYGEN SATURATION: 97 %

## 2024-05-23 VITALS
TEMPERATURE: 98.5 F | HEART RATE: 64 BPM | RESPIRATION RATE: 16 BRPM | SYSTOLIC BLOOD PRESSURE: 118 MMHG | DIASTOLIC BLOOD PRESSURE: 60 MMHG | OXYGEN SATURATION: 93 %

## 2024-05-23 DIAGNOSIS — Z86.73 HISTORY OF CVA (CEREBROVASCULAR ACCIDENT): ICD-10-CM

## 2024-05-23 DIAGNOSIS — I73.9 PERIPHERAL VASCULAR DISEASE (HCC): ICD-10-CM

## 2024-05-23 DIAGNOSIS — E21.3 HYPERPARATHYROIDISM (HCC): ICD-10-CM

## 2024-05-23 DIAGNOSIS — M25.561 RECURRENT PAIN OF BOTH KNEES: Primary | ICD-10-CM

## 2024-05-23 DIAGNOSIS — M25.562 RECURRENT PAIN OF BOTH KNEES: Primary | ICD-10-CM

## 2024-05-23 DIAGNOSIS — I26.99 PULMONARY EMBOLISM ON LEFT (HCC): ICD-10-CM

## 2024-05-23 DIAGNOSIS — E83.52 HYPERCALCEMIA: ICD-10-CM

## 2024-05-23 PROCEDURE — G0299 HHS/HOSPICE OF RN EA 15 MIN: HCPCS

## 2024-05-23 ASSESSMENT — ENCOUNTER SYMPTOMS
PAIN LOCATION - PAIN QUALITY: JUST PAIN
STOOL DESCRIPTION: SOFT FORMED
BOWEL INCONTINENCE: 1
PAIN LOCATION - PAIN QUALITY: ACHING
DYSPNEA ACTIVITY LEVEL: AFTER AMBULATING LESS THAN 10 FT

## 2024-05-23 NOTE — TELEPHONE ENCOUNTER
Tori from  Home Health called, states pt's family has decided to move forward with Hospice. Requ order for referral to  Hospice.

## 2024-05-23 NOTE — TELEPHONE ENCOUNTER
Hospice referral placed    Orders Placed This Encounter    External Referral to Hospice     Referral Priority:   Routine     Referral Type:   Hospice     Referral Reason:   Specialty Services Required     Requested Specialty:   Hospice and Palliative Medicine     Number of Visits Requested:   1

## 2024-05-24 ENCOUNTER — HOME CARE VISIT (OUTPATIENT)
Dept: SCHEDULING | Facility: HOME HEALTH | Age: 89
End: 2024-05-24
Payer: MEDICARE

## 2024-05-24 ENCOUNTER — HOME CARE VISIT (OUTPATIENT)
Dept: HOME HEALTH SERVICES | Facility: HOME HEALTH | Age: 89
End: 2024-05-24

## 2024-05-24 VITALS
WEIGHT: 155 LBS | DIASTOLIC BLOOD PRESSURE: 90 MMHG | SYSTOLIC BLOOD PRESSURE: 160 MMHG | HEART RATE: 84 BPM | HEIGHT: 65 IN | TEMPERATURE: 98.6 F | OXYGEN SATURATION: 93 % | RESPIRATION RATE: 24 BRPM | BODY MASS INDEX: 25.83 KG/M2

## 2024-05-24 PROBLEM — M62.81 MUSCLE WEAKNESS (GENERALIZED): Status: ACTIVE | Noted: 2024-03-13

## 2024-05-24 PROBLEM — R53.1 WEAKNESS: Status: ACTIVE | Noted: 2024-03-13

## 2024-05-24 PROBLEM — I10 ESSENTIAL (PRIMARY) HYPERTENSION: Status: ACTIVE | Noted: 2024-03-13

## 2024-05-24 PROBLEM — R13.19 OTHER DYSPHAGIA: Status: ACTIVE | Noted: 2024-01-01

## 2024-05-24 PROCEDURE — 2500000001 HSPC NON INJECTABLE MED

## 2024-05-24 PROCEDURE — G0299 HHS/HOSPICE OF RN EA 15 MIN: HCPCS

## 2024-05-24 PROCEDURE — 0651 HSPC ROUTINE HOME CARE

## 2024-05-24 RX ORDER — POTASSIUM CHLORIDE 1500 MG/1
10 TABLET, EXTENDED RELEASE ORAL 2 TIMES DAILY
COMMUNITY
Start: 2024-04-05

## 2024-05-24 RX ORDER — HYDROCODONE BITARTRATE AND ACETAMINOPHEN 7.5; 325 MG/1; MG/1
1 TABLET ORAL EVERY 4 HOURS PRN
COMMUNITY
Start: 2024-04-26 | End: 2024-05-29

## 2024-05-24 RX ORDER — MORPHINE SULFATE 15 MG/1
15 TABLET, FILM COATED, EXTENDED RELEASE ORAL 2 TIMES DAILY
COMMUNITY
Start: 2024-05-04 | End: 2024-05-29

## 2024-05-24 RX ORDER — PANTOPRAZOLE SODIUM 40 MG/1
40 TABLET, DELAYED RELEASE ORAL DAILY
COMMUNITY
Start: 2024-05-03

## 2024-05-24 RX ORDER — GABAPENTIN 100 MG/1
100 CAPSULE ORAL 3 TIMES DAILY
COMMUNITY
Start: 2024-05-17

## 2024-05-24 ASSESSMENT — ENCOUNTER SYMPTOMS
ABDOMINAL PAIN: 1
CONSTIPATION: 1
NAUSEA: 1
VOMITING: 1
PAIN LOCATION - PAIN QUALITY: ACHE
DYSPNEA ACTIVITY LEVEL: AFTER AMBULATING LESS THAN 10 FT

## 2024-05-24 NOTE — HOSPICE
Lilliam Judd is a 89 yr old woman - a/o person, place who has been admitted to [facility] with a hospice dx Moderate Protein Calorie Malnutrition with associated dx: unintended weight loss, hypoalbuminemia, non healing pressure ulcers, chronic pain, anemia of chronic disease.  Patient is FULL CODE.  PPS 30%  VS 98.6 - 84apical reg - 24 - 160/90   O2 Sat 93% LS dim throughout - difficult to assess as patient moaning softy throughout visit - sn unable to turn patient to fully assess lung sounds due to pain with movement.  Patient is bedbound - family reporting they have been using ford lift to get pt oob to chair - trying to build up patient's tolerance to sitting up.  Dtr Jena reports patient had been able to tolerate up to 45 min out of bed prior to recent hospitalization.  At this time, patient does not tolerate turning in bed or sitting with hob elevated for more than a few minutes.  Patient and dtr report patient with increasing pain prior to hospitalization - reporting patient was on and off uncomfortable in hospital.  Patient had been on palliative care with nursing home care.  However, due to patient's frequent hospitalizations - 3x since March 2024 -and increase pain, dtr reports she was encouraged to look into hospice.  Dtr verbalized fair understanding on hospice - agreeing that patient is in pain - and wants to focus on patient's comfort.  However, dtr became tearful when sn explained to her that patient is eligible for hospice as pt has a life expectancy of 6 months or less if the illness runs its normal course. Ongoing teaching required.  At this time, dtr reports her mother wants \" everything done to keep her alive.\" - at the same time, verbalizing gratefulness that patient no longer will be going out to md appts.  Patient c/o general pain - becoming anxious with increase respirations, moaning when sn gently unwrapped dressing to L heel.  Heel with dry black eschar - about 2.5cm x 2.5cm.

## 2024-05-25 ENCOUNTER — HOME CARE VISIT (OUTPATIENT)
Dept: SCHEDULING | Facility: HOME HEALTH | Age: 89
End: 2024-05-25
Payer: MEDICARE

## 2024-05-25 ENCOUNTER — TELEPHONE (OUTPATIENT)
Dept: HOSPICE | Facility: HOSPICE | Age: 89
End: 2024-05-25

## 2024-05-25 VITALS
HEART RATE: 78 BPM | SYSTOLIC BLOOD PRESSURE: 180 MMHG | DIASTOLIC BLOOD PRESSURE: 90 MMHG | RESPIRATION RATE: 14 BRPM | TEMPERATURE: 96.8 F

## 2024-05-25 PROCEDURE — 0651 HSPC ROUTINE HOME CARE

## 2024-05-25 PROCEDURE — 2500000001 HSPC NON INJECTABLE MED

## 2024-05-25 PROCEDURE — G0299 HHS/HOSPICE OF RN EA 15 MIN: HCPCS

## 2024-05-25 ASSESSMENT — ENCOUNTER SYMPTOMS
CONSTIPATION: 1
INDIGESTION: 1
NAUSEA: 1

## 2024-05-25 NOTE — HOSPICE
Greeted at door by one of patient's sons for 24 hour visit. Pt is found resting in a hospital bed in the living room. Pt's daughter Allie and two additional sons are present during visit. Allie states pt had a \"rough night\". She had severe nausea, vomiting and pain to her BLE. After starting Zofran pt has relief from nausea and was able to tolerate a few tablespoons of cream of wheat and a few sips of water throughout the day. Education provided on how to appropriately advance her diet.   Upon assessment pt denies pain at rest, but reports 8/10 pain to BLE with repositioning in bed. Fentanyl patch applied to right deltoid by RN during visit and education provided to Allie on how to apply it. Roxanol given per orders prior to turning pt for wound care to coccyx and bilateral heels. Dressings applied yesterday by admissions nurse had fallen off so Optifoam applied to bilat. heels today as wound care supplies were not available yet in pt's home. Heels were left floating with pillows underneath to relieve pressure.  Vital signs obtained. Purewick catheter exchanged per family's request since it was leaking during visit. Pt's linens changed and bed left in the lowest position with siderails up. Medications reviewed in detail. Pt and her children verbalized understanding and are in agreement with POC. [facility] 24/7 phone number reviewed and they were encouraged to call with any additional questions, needs or concerns.

## 2024-05-26 PROCEDURE — 0651 HSPC ROUTINE HOME CARE

## 2024-05-27 ENCOUNTER — HOME CARE VISIT (OUTPATIENT)
Dept: HOSPICE | Facility: HOSPICE | Age: 89
End: 2024-05-27
Payer: MEDICARE

## 2024-05-27 PROCEDURE — 0651 HSPC ROUTINE HOME CARE

## 2024-05-28 ENCOUNTER — HOME CARE VISIT (OUTPATIENT)
Dept: HOSPICE | Facility: HOSPICE | Age: 89
End: 2024-05-28
Payer: MEDICARE

## 2024-05-28 ENCOUNTER — HOME CARE VISIT (OUTPATIENT)
Dept: SCHEDULING | Facility: HOME HEALTH | Age: 89
End: 2024-05-28
Payer: MEDICARE

## 2024-05-28 VITALS — SYSTOLIC BLOOD PRESSURE: 224 MMHG | RESPIRATION RATE: 20 BRPM | HEART RATE: 78 BPM | DIASTOLIC BLOOD PRESSURE: 100 MMHG

## 2024-05-28 PROCEDURE — G0156 HHCP-SVS OF AIDE,EA 15 MIN: HCPCS

## 2024-05-28 PROCEDURE — G0299 HHS/HOSPICE OF RN EA 15 MIN: HCPCS

## 2024-05-28 PROCEDURE — 0651 HSPC ROUTINE HOME CARE

## 2024-05-28 PROCEDURE — G0155 HHCP-SVS OF CSW,EA 15 MIN: HCPCS

## 2024-05-28 ASSESSMENT — ENCOUNTER SYMPTOMS
CONSTIPATION: 1
STOOL DESCRIPTION: LARGE

## 2024-05-28 NOTE — HOSPICE
Joint visit with Marci .  Lilliam is a 89 yo Black Y[place] female born 1934 diagnosed with Moderate Protein Calorie Malnutrition, Hyperparathyroidism.  Lilliam is a FULL CODE.  No mortuary selected at present.  Plans are to have burial in [place], [place] next to her  2nd , Dean.  BB.  Expressing discomfort when moved.  DME was working with Lilliam when / arrived.  Two gentle/quiet dogs in home.  Bulldog - Max and Jose Terrier - Marci.  Yes, same name as the .  Lilliam was minimally alert, presenting as tired.  She has had a full day with visits and DME coming in.  Lilliam moaned when moved by DME into her new hospice bed which was placed in the living room of the home.  Following conversation with anna Kelley,  and  visit with pt.  Lilliam was minimally verbal,  she was able to communicate her needs, accepted prayer, followed along in the prayer and responded spiritually.  Allie shared all the information below and above.  Lilliam's appetite has decreased believing the reason for the decrease being her medications and she believes her additional sleep is also caused by the medications.  According to the admitting Rn, Lilliam wants \"everything done to keep her alive\".  Allie is aware of hospice philosophy and acknowledges understanding of signs of decline.  She also acknowledged understanding in alternate communication number when 1700# is down.  Lilliam is twice .  First marriage ended in divorce and then death to Antony Melton.  They had children - 1 girl (oldest) and 4 boys.  9 grandchildren and 3 great grands.  Antony  in .  He was a  in WW[place], served in the [employer] and was a pharmacist after his service.  He second marriage was to Dean Song who  in  and is buried in [place], [place].  According to Allie, both men were good people.  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

## 2024-05-28 NOTE — TELEPHONE ENCOUNTER
Patient admitted yesterday to home hospice.  C/o vomiting unrelieved by Zofran. Orders given for haldol liquid and phenergan suppository as well as Roxanol for pain. If nausea/vomiting remains unrelieved, she will need to be admitted for symptom management at the hospice house.

## 2024-05-28 NOTE — HOSPICE
Binta's cognitive status and emotional status challenged by limited time spent with Pt.  No s/s active unmanaged emotional distress.  MSW will cont to offer visits monthly and prn to provide ongoing emotional support and assessment of psychosocial and bereavement concerns and needs.

## 2024-05-28 NOTE — HOSPICE
Greeted at the door by patient's son. Patient's daughter present and interactive during visit. Daughter reports that patient had a large bowel movement this morning and is eating only bites and sips. Once entering the living room, observed patient lying supine in the hospital bed. Overt signs of pain noted. Alert and oriented to at least person and situation. Endorses severe pain. Assessment completed, see ROS sections. Education provided, see POC. Wound care provided, see updated/corrected care plan and wound sections. Patient repositioned in bed for maximum comfort. Tolerated with exacerbated pain. Bed low, side rails x2. Daughter administered a dose of Roxanol prior to cares. Patient resting quietly with eyes closed at end of visit. Reviewed Roxanol orders with daughter and encouraged her to follow orders for signs of pain. She is agreeable at this time. Denies further questions or needs. Instructed to contact [facility] with any acute changes or needs. Verbalizes understanding and in agreement with updated POC.

## 2024-05-29 ENCOUNTER — HOME CARE VISIT (OUTPATIENT)
Dept: HOSPICE | Facility: HOSPICE | Age: 89
End: 2024-05-29
Payer: MEDICARE

## 2024-05-29 PROCEDURE — 0651 HSPC ROUTINE HOME CARE

## 2024-05-29 PROCEDURE — 2500000001 HSPC NON INJECTABLE MED

## 2024-05-30 ENCOUNTER — HOME CARE VISIT (OUTPATIENT)
Dept: SCHEDULING | Facility: HOME HEALTH | Age: 89
End: 2024-05-30
Payer: MEDICARE

## 2024-05-30 PROCEDURE — 0651 HSPC ROUTINE HOME CARE

## 2024-05-31 ENCOUNTER — HOME CARE VISIT (OUTPATIENT)
Dept: HOSPICE | Facility: HOSPICE | Age: 89
End: 2024-05-31
Payer: MEDICARE

## 2024-05-31 ENCOUNTER — HOME CARE VISIT (OUTPATIENT)
Dept: SCHEDULING | Facility: HOME HEALTH | Age: 89
End: 2024-05-31
Payer: MEDICARE

## 2024-05-31 VITALS — RESPIRATION RATE: 14 BRPM | DIASTOLIC BLOOD PRESSURE: 100 MMHG | SYSTOLIC BLOOD PRESSURE: 160 MMHG | HEART RATE: 120 BPM

## 2024-05-31 PROCEDURE — G0299 HHS/HOSPICE OF RN EA 15 MIN: HCPCS

## 2024-05-31 PROCEDURE — G0156 HHCP-SVS OF AIDE,EA 15 MIN: HCPCS

## 2024-05-31 PROCEDURE — 0651 HSPC ROUTINE HOME CARE

## 2024-05-31 PROCEDURE — 2500000001 HSPC NON INJECTABLE MED

## 2024-05-31 NOTE — CASE COMMUNICATION
has been in communication with darnell Kelley's d, who requested a  visit.   coordinated request through [facility] where they have attended/members.  /Representative is scheduled to contact Allie to organize day/time of visit.

## 2024-05-31 NOTE — HOSPICE
PRN visit due to patient's uncontrolled pain. Greeted at door by patient's son. Daughter Allie was at bedside. Upon entering living area, observed patient lying in hospital bed with eyes closed. Moderate level of pain noted which was exacerbated when patient was moved. Allie reports that she has been giving patient PRN Roxanol every 3 hours for the last 24 hours and 2 12 mcg Fentanyl patches are in place. Lilliam BERRIOS contacted with new orders received, see new orders. Allie was agreeable to new orders and 2 25 mcg Fentanyl patches were placed. Patient was unable to be repositioned in bed due to level of pain. Encouraged Allie to continue to give PRN Roxanol every 3 hours for non verbal signs of pain, and she agrees and complied by giving patient the due dose. Allie tearful during this visit, hoping that once the patient's pain was under control that she would be able to eat, respond verbally, and even walk again. This RN provided emotional support by active listening, educational responses, and discussing signs of EOL. She remains in denial. Denies further questions or needs. Reviewed [facility] 24/7 telephone number and encouraged to call with any acute changes or needs. Verbalizes understanding and in agreement with updated POC. Topical Ketoconazole Counseling: Patient counseled that this medication may cause skin irritation or allergic reactions.  In the event of skin irritation, the patient was advised to reduce the amount of the drug applied or use it less frequently.   The patient verbalized understanding of the proper use and possible adverse effects of ketoconazole.  All of the patient's questions and concerns were addressed.

## 2024-06-01 PROCEDURE — 0651 HSPC ROUTINE HOME CARE

## 2024-06-02 PROCEDURE — 0651 HSPC ROUTINE HOME CARE

## 2024-06-03 NOTE — HOSPICE
PRN visit r/t iminient status of pt.   visited, all family were at bedside.  Allie was busy caring for Lilliam while the other family members were sharing their thoughts with the .  Allie stated that she had given her mother some pain medication, the Aide had been by earlier and helped to clean and turn her, and Allie rubbed some cream on her mother's heels.  While doing so, Lilliam moaned and her brow went up.  While not moving or being moved, Lilliam appeared comfortable with no pain level expressed or observed.  The son's began talking about her getting better and wondered if O2 and turning her on her side to work with the sore on her backside would help to get her better.   spoke with them about her condition and asked them what they were seeing.  In so doing, it opened the door to speak with them about signs of decline and where their mother is on her journey.  Lilliam was minimally awake, non-verbal, and her breathing had changed since last week.  She was breathing one respiration then a series of very shallow breaths, and then apnea for several more seconds before repeating the pattern.  Some gurgling was present.   spoke with the family about their jesse, Lilliam's jesse, confirmed the 's visit for the sacraments, and God's presence with her/them.   shared scripture and offered prayer as KEO Jones and jessica Newberry arrived to the home.   spoke with Marci , and KEO Jones following the visit and updated them on his visit.  Next visit will be as needed/requested by family as pt is actively dying.

## 2024-06-03 NOTE — HOSPICE
Greeted at the door by patient's daughter Allie who reports that the family is praying with [facility] . Reports that her mother's vitals are declining and that she is giving Roxanol PRN for pain. Upon entering living area, observed patient lying in hospital bed with eyes closed. Eyes remained closed for visit. All of her children surrounding her bed during prayer. Signs of pain noted at rest, see pain section. Assessment completed, see ROS sections. Education provided, see POC. Allie declines wound care on this date due to patient's pain. All present deny further questions or needs. Instructed to contact [facility] with any acute changes or needs. Verbalizes understanding and in agreement with updated POC.

## 2024-06-04 NOTE — HOSPICE
Dr. Fontana's initial visit. Greeted at door by patient's daughter Allie. All other children present. Upon entering living area, observed patient lying in semi-fowlers position in hospital bed with eyes closed. Eyes remained closed for entire visit with no non-verbal communication noted on this date. Some signs of pain noted. Questions answered by Dr. Fontana. Family denies further questions or needs. Instructed to contact [facility] with any acute changes or needs. Verbalizes understanding and in agreement with POC.

## 2024-06-05 NOTE — HOSPICE
Dtr called to say pt has . family present in the home and grieving appropriately. Absence of vital signs noted. post mortem care done. pt had large loose stools. Wick removed by dtr. body released to [facility] to be taken to final destination in [place] at a later time. All meds discarded in drug disposal bottle and drug disposal form signed by family.[facility] notified to remove dme tomorrow.